# Patient Record
Sex: FEMALE | Race: WHITE | Employment: OTHER | URBAN - METROPOLITAN AREA
[De-identification: names, ages, dates, MRNs, and addresses within clinical notes are randomized per-mention and may not be internally consistent; named-entity substitution may affect disease eponyms.]

---

## 2017-05-03 DIAGNOSIS — E78.49 HYPERLIPIDEMIA, FAMILIAL, HIGH LDL: ICD-10-CM

## 2017-05-03 RX ORDER — ATORVASTATIN CALCIUM 40 MG/1
40 TABLET, FILM COATED ORAL DAILY
Qty: 90 TABLET | Refills: 0 | Status: SHIPPED | OUTPATIENT
Start: 2017-05-03 | End: 2017-05-30 | Stop reason: SDUPTHER

## 2017-05-07 DIAGNOSIS — M79.2 NEURALGIA OF RIGHT FOOT: ICD-10-CM

## 2017-05-07 DIAGNOSIS — M79.2 NEURALGIA OF LEFT FOOT: ICD-10-CM

## 2017-05-08 RX ORDER — GABAPENTIN 300 MG/1
CAPSULE ORAL
Qty: 200 CAPSULE | Refills: 0 | Status: SHIPPED | OUTPATIENT
Start: 2017-05-08 | End: 2017-06-27 | Stop reason: SDUPTHER

## 2017-05-30 ENCOUNTER — OFFICE VISIT (OUTPATIENT)
Dept: INTERNAL MEDICINE CLINIC | Age: 72
End: 2017-05-30

## 2017-05-30 VITALS
SYSTOLIC BLOOD PRESSURE: 115 MMHG | RESPIRATION RATE: 18 BRPM | BODY MASS INDEX: 25.07 KG/M2 | DIASTOLIC BLOOD PRESSURE: 70 MMHG | HEIGHT: 66 IN | WEIGHT: 156 LBS | HEART RATE: 82 BPM

## 2017-05-30 DIAGNOSIS — M17.0 PRIMARY OSTEOARTHRITIS OF BOTH KNEES: ICD-10-CM

## 2017-05-30 DIAGNOSIS — E78.49 HYPERLIPIDEMIA, FAMILIAL, HIGH LDL: ICD-10-CM

## 2017-05-30 DIAGNOSIS — R63.4 WEIGHT LOSS: ICD-10-CM

## 2017-05-30 DIAGNOSIS — F51.01 PRIMARY INSOMNIA: ICD-10-CM

## 2017-05-30 DIAGNOSIS — J44.9 COPD, SEVERE (HCC): ICD-10-CM

## 2017-05-30 DIAGNOSIS — K21.9 GASTROESOPHAGEAL REFLUX DISEASE WITHOUT ESOPHAGITIS: ICD-10-CM

## 2017-05-30 DIAGNOSIS — M25.552 LEFT HIP PAIN: ICD-10-CM

## 2017-05-30 DIAGNOSIS — I10 ESSENTIAL HYPERTENSION: ICD-10-CM

## 2017-05-30 DIAGNOSIS — N39.46 MIXED INCONTINENCE: ICD-10-CM

## 2017-05-30 DIAGNOSIS — Z00.00 MEDICARE ANNUAL WELLNESS VISIT, SUBSEQUENT: Primary | ICD-10-CM

## 2017-05-30 PROCEDURE — 99214 OFFICE O/P EST MOD 30 MIN: CPT | Performed by: INTERNAL MEDICINE

## 2017-05-30 PROCEDURE — G0439 PPPS, SUBSEQ VISIT: HCPCS | Performed by: INTERNAL MEDICINE

## 2017-05-30 RX ORDER — LISINOPRIL AND HYDROCHLOROTHIAZIDE 25; 20 MG/1; MG/1
1 TABLET ORAL DAILY
Qty: 90 TABLET | Refills: 1 | Status: SHIPPED | OUTPATIENT
Start: 2017-05-30 | End: 2017-08-08 | Stop reason: ALTCHOICE

## 2017-05-30 RX ORDER — OXYBUTYNIN CHLORIDE 5 MG/1
5 TABLET ORAL 3 TIMES DAILY PRN
Qty: 90 TABLET | Refills: 0 | Status: SHIPPED | OUTPATIENT
Start: 2017-05-30 | End: 2017-08-01 | Stop reason: SDUPTHER

## 2017-05-30 RX ORDER — ATORVASTATIN CALCIUM 40 MG/1
40 TABLET, FILM COATED ORAL DAILY
Qty: 90 TABLET | Refills: 0 | Status: SHIPPED | OUTPATIENT
Start: 2017-08-01 | End: 2017-08-02

## 2017-05-30 ASSESSMENT — LIFESTYLE VARIABLES
HOW OFTEN DURING THE LAST YEAR HAVE YOU HAD A FEELING OF GUILT OR REMORSE AFTER DRINKING: 0
AUDIT-C TOTAL SCORE: 3
HAVE YOU OR SOMEONE ELSE BEEN INJURED AS A RESULT OF YOUR DRINKING: 0
HOW OFTEN DURING THE LAST YEAR HAVE YOU BEEN UNABLE TO REMEMBER WHAT HAPPENED THE NIGHT BEFORE BECAUSE YOU HAD BEEN DRINKING: 0
HOW OFTEN DO YOU HAVE SIX OR MORE DRINKS ON ONE OCCASION: 0
HOW OFTEN DURING THE LAST YEAR HAVE YOU FAILED TO DO WHAT WAS NORMALLY EXPECTED FROM YOU BECAUSE OF DRINKING: 0
HOW OFTEN DURING THE LAST YEAR HAVE YOU FOUND THAT YOU WERE NOT ABLE TO STOP DRINKING ONCE YOU HAD STARTED: 0
HAS A RELATIVE, FRIEND, DOCTOR, OR ANOTHER HEALTH PROFESSIONAL EXPRESSED CONCERN ABOUT YOUR DRINKING OR SUGGESTED YOU CUT DOWN: 0
HOW OFTEN DO YOU HAVE A DRINK CONTAINING ALCOHOL: 3
HOW MANY STANDARD DRINKS CONTAINING ALCOHOL DO YOU HAVE ON A TYPICAL DAY: 0
AUDIT TOTAL SCORE: 3
HOW OFTEN DURING THE LAST YEAR HAVE YOU NEEDED AN ALCOHOLIC DRINK FIRST THING IN THE MORNING TO GET YOURSELF GOING AFTER A NIGHT OF HEAVY DRINKING: 0

## 2017-05-30 ASSESSMENT — PATIENT HEALTH QUESTIONNAIRE - PHQ9: SUM OF ALL RESPONSES TO PHQ QUESTIONS 1-9: 0

## 2017-05-30 ASSESSMENT — ANXIETY QUESTIONNAIRES: GAD7 TOTAL SCORE: 2

## 2017-06-27 DIAGNOSIS — M79.2 NEURALGIA OF RIGHT FOOT: ICD-10-CM

## 2017-06-27 DIAGNOSIS — M79.2 NEURALGIA OF LEFT FOOT: ICD-10-CM

## 2017-06-27 RX ORDER — GABAPENTIN 300 MG/1
CAPSULE ORAL
Qty: 270 CAPSULE | Refills: 0 | Status: SHIPPED | OUTPATIENT
Start: 2017-06-27 | End: 2017-11-01 | Stop reason: SDUPTHER

## 2017-08-01 ENCOUNTER — OFFICE VISIT (OUTPATIENT)
Dept: INTERNAL MEDICINE CLINIC | Age: 72
End: 2017-08-01

## 2017-08-01 ENCOUNTER — HOSPITAL ENCOUNTER (OUTPATIENT)
Dept: OTHER | Age: 72
Discharge: HOME | End: 2017-08-01
Attending: INTERNAL MEDICINE | Admitting: INTERNAL MEDICINE

## 2017-08-01 VITALS
BODY MASS INDEX: 24.91 KG/M2 | HEIGHT: 66 IN | WEIGHT: 155 LBS | DIASTOLIC BLOOD PRESSURE: 80 MMHG | SYSTOLIC BLOOD PRESSURE: 118 MMHG | HEART RATE: 72 BPM | RESPIRATION RATE: 18 BRPM

## 2017-08-01 DIAGNOSIS — J44.9 COPD, SEVERE (HCC): ICD-10-CM

## 2017-08-01 DIAGNOSIS — F43.23 ADJUSTMENT REACTION WITH ANXIETY AND DEPRESSION: ICD-10-CM

## 2017-08-01 DIAGNOSIS — M79.2 NEURALGIA OF LEFT FOOT: ICD-10-CM

## 2017-08-01 DIAGNOSIS — J44.9 OBSTRUCTIVE CHRONIC BRONCHITIS WITHOUT EXACERBATION (HCC): ICD-10-CM

## 2017-08-01 DIAGNOSIS — M79.2 NEURALGIA OF RIGHT FOOT: ICD-10-CM

## 2017-08-01 DIAGNOSIS — I10 ESSENTIAL HYPERTENSION: ICD-10-CM

## 2017-08-01 DIAGNOSIS — R91.8 OPACITY OF LUNG ON IMAGING STUDY: ICD-10-CM

## 2017-08-01 DIAGNOSIS — N39.46 MIXED INCONTINENCE: ICD-10-CM

## 2017-08-01 DIAGNOSIS — E78.49 HYPERLIPIDEMIA, FAMILIAL, HIGH LDL: Primary | ICD-10-CM

## 2017-08-01 LAB
CHOLESTEROL, TOTAL: 170 MG/DL (ref 0–199)
HDLC SERPL-MCNC: 99 MG/DL (ref 40–60)
LDL CHOLESTEROL CALCULATED: 55 MG/DL
TRIGL SERPL-MCNC: 80 MG/DL (ref 0–150)
VLDLC SERPL CALC-MCNC: 16 MG/DL

## 2017-08-01 PROCEDURE — 99214 OFFICE O/P EST MOD 30 MIN: CPT | Performed by: INTERNAL MEDICINE

## 2017-08-01 PROCEDURE — 36415 COLL VENOUS BLD VENIPUNCTURE: CPT | Performed by: INTERNAL MEDICINE

## 2017-08-01 RX ORDER — GABAPENTIN 300 MG/1
CAPSULE ORAL
Qty: 200 CAPSULE | Refills: 4 | Status: ON HOLD | OUTPATIENT
Start: 2017-09-01 | End: 2018-06-04

## 2017-08-01 RX ORDER — OXYBUTYNIN CHLORIDE 5 MG/1
5 TABLET ORAL 3 TIMES DAILY PRN
Qty: 90 TABLET | Refills: 8 | Status: SHIPPED | OUTPATIENT
Start: 2017-08-01 | End: 2018-07-12 | Stop reason: SDUPTHER

## 2017-08-01 RX ORDER — MIRTAZAPINE 15 MG/1
15 TABLET, FILM COATED ORAL NIGHTLY
Qty: 30 TABLET | Refills: 0 | Status: SHIPPED | OUTPATIENT
Start: 2017-08-01 | End: 2018-03-22 | Stop reason: ALTCHOICE

## 2017-08-02 ENCOUNTER — TELEPHONE (OUTPATIENT)
Dept: INTERNAL MEDICINE CLINIC | Age: 72
End: 2017-08-02

## 2017-08-02 DIAGNOSIS — E78.49 HYPERLIPIDEMIA, FAMILIAL, HIGH LDL: Primary | ICD-10-CM

## 2017-08-02 DIAGNOSIS — I10 ESSENTIAL HYPERTENSION: Primary | ICD-10-CM

## 2017-08-02 LAB
ALBUMIN SERPL-MCNC: 4.5 G/DL (ref 3.4–5)
ANION GAP SERPL CALCULATED.3IONS-SCNC: 21 MMOL/L (ref 3–16)
BUN BLDV-MCNC: 9 MG/DL (ref 7–20)
CALCIUM SERPL-MCNC: 9.8 MG/DL (ref 8.3–10.6)
CHLORIDE BLD-SCNC: 95 MMOL/L (ref 99–110)
CO2: 23 MMOL/L (ref 21–32)
CREAT SERPL-MCNC: 0.7 MG/DL (ref 0.6–1.2)
GFR AFRICAN AMERICAN: >60
GFR NON-AFRICAN AMERICAN: >60
GLUCOSE BLD-MCNC: 81 MG/DL (ref 70–99)
PHOSPHORUS: 3.2 MG/DL (ref 2.5–4.9)
POTASSIUM SERPL-SCNC: 4.5 MMOL/L (ref 3.5–5.1)
SODIUM BLD-SCNC: 139 MMOL/L (ref 136–145)

## 2017-08-02 RX ORDER — ATORVASTATIN CALCIUM 20 MG/1
20 TABLET, FILM COATED ORAL DAILY
Qty: 90 TABLET | Refills: 0 | Status: SHIPPED | OUTPATIENT
Start: 2017-08-02 | End: 2018-02-27 | Stop reason: SDUPTHER

## 2017-08-04 ENCOUNTER — HOSPITAL ENCOUNTER (OUTPATIENT)
Dept: CT IMAGING | Age: 72
Discharge: HOME | End: 2017-08-04
Attending: INTERNAL MEDICINE | Admitting: INTERNAL MEDICINE

## 2017-08-04 DIAGNOSIS — R91.8 OPACITY OF LUNG ON IMAGING STUDY: ICD-10-CM

## 2017-08-04 DIAGNOSIS — R91.8 MASS OF UPPER LOBE OF LEFT LUNG: ICD-10-CM

## 2017-08-04 DIAGNOSIS — J44.9 COPD, SEVERE (HCC): Primary | ICD-10-CM

## 2017-08-04 DIAGNOSIS — R91.8 OTHER NONSPECIFIC ABNORMAL FINDING OF LUNG FIELD: ICD-10-CM

## 2017-08-04 PROBLEM — I25.10 CORONARY ARTERY DISEASE INVOLVING NATIVE CORONARY ARTERY OF NATIVE HEART WITHOUT ANGINA PECTORIS: Status: ACTIVE | Noted: 2017-08-04

## 2017-08-08 ENCOUNTER — TELEPHONE (OUTPATIENT)
Dept: PULMONOLOGY | Age: 72
End: 2017-08-08

## 2017-08-08 ENCOUNTER — OFFICE VISIT (OUTPATIENT)
Dept: PULMONOLOGY | Age: 72
End: 2017-08-08

## 2017-08-08 VITALS
SYSTOLIC BLOOD PRESSURE: 140 MMHG | HEART RATE: 83 BPM | DIASTOLIC BLOOD PRESSURE: 86 MMHG | OXYGEN SATURATION: 94 % | WEIGHT: 155 LBS | BODY MASS INDEX: 24.33 KG/M2 | HEIGHT: 67 IN

## 2017-08-08 DIAGNOSIS — R59.0 MEDIASTINAL ADENOPATHY: ICD-10-CM

## 2017-08-08 DIAGNOSIS — F17.200 TOBACCO DEPENDENCE: ICD-10-CM

## 2017-08-08 DIAGNOSIS — R91.8 MASS OF LEFT LUNG: Primary | ICD-10-CM

## 2017-08-08 DIAGNOSIS — R91.8 GROUND GLASS OPACITY PRESENT ON IMAGING OF LUNG: ICD-10-CM

## 2017-08-08 DIAGNOSIS — J41.0 SIMPLE CHRONIC BRONCHITIS (HCC): ICD-10-CM

## 2017-08-08 PROCEDURE — 99215 OFFICE O/P EST HI 40 MIN: CPT | Performed by: INTERNAL MEDICINE

## 2017-08-08 ASSESSMENT — ENCOUNTER SYMPTOMS
CHEST TIGHTNESS: 0
DIARRHEA: 0
EYE ITCHING: 0
EYE PAIN: 0
COUGH: 1
CONSTIPATION: 0
ABDOMINAL PAIN: 0
VOICE CHANGE: 0
EYE DISCHARGE: 0
SHORTNESS OF BREATH: 1
SORE THROAT: 0

## 2017-08-16 ENCOUNTER — HOSPITAL ENCOUNTER (OUTPATIENT)
Dept: SURGERY | Age: 72
Discharge: HOME | End: 2017-08-16
Attending: INTERNAL MEDICINE | Admitting: INTERNAL MEDICINE

## 2017-08-16 VITALS
HEIGHT: 67 IN | WEIGHT: 155 LBS | OXYGEN SATURATION: 93 % | TEMPERATURE: 97.1 F | SYSTOLIC BLOOD PRESSURE: 134 MMHG | RESPIRATION RATE: 16 BRPM | BODY MASS INDEX: 24.33 KG/M2 | HEART RATE: 70 BPM | DIASTOLIC BLOOD PRESSURE: 93 MMHG

## 2017-08-16 DIAGNOSIS — R91.8 MASS OF LEFT LUNG: ICD-10-CM

## 2017-08-16 LAB
APPEARANCE BAL (LAVAGE): CLEAR
CLOT EVALUATION BAL: ABNORMAL
COLOR LAVAGE: ABNORMAL
EOSIN: 13 %
INR BLD: 0.88 (ref 0.85–1.15)
LYMPHOCYTES, BAL: 43 % (ref 5–10)
MACROPHAGES, BAL: 17 % (ref 90–95)
NUMBER OF CELLS COUNTED BAL (LAVAGE): 100
PROTHROMBIN TIME: 10 SEC (ref 9.6–13)
RBC, BAL: 1700 /CUMM
SEGMENTED NEUTROPHILS, BAL: 27 % (ref 5–10)
WBC/EPI CELLS BAL: 92 /CUMM

## 2017-08-16 PROCEDURE — 31628 BRONCHOSCOPY/LUNG BX EACH: CPT | Performed by: INTERNAL MEDICINE

## 2017-08-16 PROCEDURE — 31653 BRONCH EBUS SAMPLNG 3/> NODE: CPT | Performed by: INTERNAL MEDICINE

## 2017-08-16 PROCEDURE — 31623 DX BRONCHOSCOPE/BRUSH: CPT | Performed by: INTERNAL MEDICINE

## 2017-08-16 PROCEDURE — 31654 BRONCH EBUS IVNTJ PERPH LES: CPT | Performed by: INTERNAL MEDICINE

## 2017-08-16 PROCEDURE — 31624 DX BRONCHOSCOPE/LAVAGE: CPT | Performed by: INTERNAL MEDICINE

## 2017-08-16 PROCEDURE — 31629 BRONCHOSCOPY/NEEDLE BX EACH: CPT | Performed by: INTERNAL MEDICINE

## 2017-08-16 PROCEDURE — 31627 NAVIGATIONAL BRONCHOSCOPY: CPT | Performed by: INTERNAL MEDICINE

## 2017-08-16 RX ORDER — PROMETHAZINE HYDROCHLORIDE 25 MG/ML
6.25 INJECTION, SOLUTION INTRAMUSCULAR; INTRAVENOUS
Status: ACTIVE | OUTPATIENT
Start: 2017-08-16 | End: 2017-08-16

## 2017-08-16 RX ORDER — HYDROMORPHONE HCL 110MG/55ML
0.5 PATIENT CONTROLLED ANALGESIA SYRINGE INTRAVENOUS EVERY 5 MIN PRN
Status: DISCONTINUED | OUTPATIENT
Start: 2017-08-16 | End: 2017-08-17 | Stop reason: HOSPADM

## 2017-08-16 RX ORDER — HYDRALAZINE HYDROCHLORIDE 20 MG/ML
5 INJECTION INTRAMUSCULAR; INTRAVENOUS EVERY 10 MIN PRN
Status: DISCONTINUED | OUTPATIENT
Start: 2017-08-16 | End: 2017-08-17 | Stop reason: HOSPADM

## 2017-08-16 RX ORDER — HYDROMORPHONE HCL 110MG/55ML
0.25 PATIENT CONTROLLED ANALGESIA SYRINGE INTRAVENOUS EVERY 5 MIN PRN
Status: DISCONTINUED | OUTPATIENT
Start: 2017-08-16 | End: 2017-08-17 | Stop reason: HOSPADM

## 2017-08-16 RX ORDER — SODIUM CHLORIDE 0.9 % (FLUSH) 0.9 %
10 SYRINGE (ML) INJECTION PRN
Status: DISCONTINUED | OUTPATIENT
Start: 2017-08-16 | End: 2017-08-17 | Stop reason: HOSPADM

## 2017-08-16 RX ORDER — LABETALOL HYDROCHLORIDE 5 MG/ML
5 INJECTION, SOLUTION INTRAVENOUS EVERY 10 MIN PRN
Status: DISCONTINUED | OUTPATIENT
Start: 2017-08-16 | End: 2017-08-17 | Stop reason: HOSPADM

## 2017-08-16 RX ORDER — OXYCODONE HYDROCHLORIDE 5 MG/1
10 TABLET ORAL PRN
Status: ACTIVE | OUTPATIENT
Start: 2017-08-16 | End: 2017-08-16

## 2017-08-16 RX ORDER — LIDOCAINE HYDROCHLORIDE 40 MG/ML
5 SOLUTION TOPICAL ONCE
Status: DISCONTINUED | OUTPATIENT
Start: 2017-08-16 | End: 2017-08-17 | Stop reason: HOSPADM

## 2017-08-16 RX ORDER — LIDOCAINE HYDROCHLORIDE 10 MG/ML
1 INJECTION, SOLUTION INFILTRATION; PERINEURAL
Status: ACTIVE | OUTPATIENT
Start: 2017-08-16 | End: 2017-08-16

## 2017-08-16 RX ORDER — SODIUM CHLORIDE, SODIUM LACTATE, POTASSIUM CHLORIDE, CALCIUM CHLORIDE 600; 310; 30; 20 MG/100ML; MG/100ML; MG/100ML; MG/100ML
INJECTION, SOLUTION INTRAVENOUS CONTINUOUS
Status: DISCONTINUED | OUTPATIENT
Start: 2017-08-16 | End: 2017-08-17 | Stop reason: HOSPADM

## 2017-08-16 RX ORDER — SODIUM CHLORIDE 0.9 % (FLUSH) 0.9 %
10 SYRINGE (ML) INJECTION EVERY 12 HOURS SCHEDULED
Status: DISCONTINUED | OUTPATIENT
Start: 2017-08-16 | End: 2017-08-17 | Stop reason: HOSPADM

## 2017-08-16 RX ORDER — OXYCODONE HYDROCHLORIDE 5 MG/1
5 TABLET ORAL PRN
Status: ACTIVE | OUTPATIENT
Start: 2017-08-16 | End: 2017-08-16

## 2017-08-16 RX ORDER — MEPERIDINE HYDROCHLORIDE 25 MG/ML
12.5 INJECTION INTRAMUSCULAR; INTRAVENOUS; SUBCUTANEOUS EVERY 5 MIN PRN
Status: DISCONTINUED | OUTPATIENT
Start: 2017-08-16 | End: 2017-08-17 | Stop reason: HOSPADM

## 2017-08-16 RX ORDER — METOCLOPRAMIDE HYDROCHLORIDE 5 MG/ML
10 INJECTION INTRAMUSCULAR; INTRAVENOUS
Status: ACTIVE | OUTPATIENT
Start: 2017-08-16 | End: 2017-08-16

## 2017-08-16 RX ORDER — DIPHENHYDRAMINE HYDROCHLORIDE 50 MG/ML
12.5 INJECTION INTRAMUSCULAR; INTRAVENOUS
Status: ACTIVE | OUTPATIENT
Start: 2017-08-16 | End: 2017-08-16

## 2017-08-16 ASSESSMENT — PAIN - FUNCTIONAL ASSESSMENT: PAIN_FUNCTIONAL_ASSESSMENT: 0-10

## 2017-08-18 LAB
CULTURE, RESPIRATORY: NORMAL
GRAM STAIN RESULT: NORMAL

## 2017-08-21 ENCOUNTER — TELEPHONE (OUTPATIENT)
Dept: PULMONOLOGY | Age: 72
End: 2017-08-21

## 2017-08-21 DIAGNOSIS — R91.8 MULTIPLE PULMONARY NODULES: Primary | ICD-10-CM

## 2017-09-18 LAB
FUNGUS (MYCOLOGY) CULTURE: NORMAL
FUNGUS STAIN: NORMAL

## 2017-10-03 LAB
AFB CULTURE (MYCOBACTERIA): NORMAL
AFB SMEAR: NORMAL

## 2017-10-31 ENCOUNTER — HOSPITAL ENCOUNTER (OUTPATIENT)
Dept: MAMMOGRAPHY | Age: 72
Discharge: HOME | End: 2017-10-31
Attending: INTERNAL MEDICINE | Admitting: INTERNAL MEDICINE

## 2017-10-31 DIAGNOSIS — Z12.39 BREAST CANCER SCREENING: ICD-10-CM

## 2017-10-31 DIAGNOSIS — R91.8 OTHER NONSPECIFIC ABNORMAL FINDING OF LUNG FIELD (CODE): ICD-10-CM

## 2017-11-01 ENCOUNTER — OFFICE VISIT (OUTPATIENT)
Dept: PULMONOLOGY | Age: 72
End: 2017-11-01

## 2017-11-01 ENCOUNTER — HOSPITAL ENCOUNTER (OUTPATIENT)
Dept: CT IMAGING | Age: 72
Discharge: HOME | End: 2017-11-01
Attending: INTERNAL MEDICINE | Admitting: INTERNAL MEDICINE

## 2017-11-01 VITALS
WEIGHT: 148.6 LBS | HEIGHT: 67 IN | TEMPERATURE: 97.8 F | HEART RATE: 89 BPM | OXYGEN SATURATION: 98 % | SYSTOLIC BLOOD PRESSURE: 136 MMHG | BODY MASS INDEX: 23.32 KG/M2 | RESPIRATION RATE: 16 BRPM | DIASTOLIC BLOOD PRESSURE: 67 MMHG

## 2017-11-01 DIAGNOSIS — J43.2 CENTRILOBULAR EMPHYSEMA (HCC): ICD-10-CM

## 2017-11-01 DIAGNOSIS — R91.8 OTHER NONSPECIFIC ABNORMAL FINDING OF LUNG FIELD: ICD-10-CM

## 2017-11-01 DIAGNOSIS — R91.8 MULTIPLE PULMONARY NODULES: Primary | ICD-10-CM

## 2017-11-01 DIAGNOSIS — R91.8 MULTIPLE PULMONARY NODULES: ICD-10-CM

## 2017-11-01 DIAGNOSIS — F17.200 TOBACCO DEPENDENCE: ICD-10-CM

## 2017-11-01 DIAGNOSIS — R91.8 OTHER NONSPECIFIC ABNORMAL FINDING OF LUNG FIELD (CODE): ICD-10-CM

## 2017-11-01 PROCEDURE — 99214 OFFICE O/P EST MOD 30 MIN: CPT | Performed by: INTERNAL MEDICINE

## 2017-11-01 ASSESSMENT — ENCOUNTER SYMPTOMS
EYE PAIN: 0
SORE THROAT: 0
VOICE CHANGE: 0
CONSTIPATION: 0
SHORTNESS OF BREATH: 1
COUGH: 0
DIARRHEA: 0
ABDOMINAL PAIN: 0
CHEST TIGHTNESS: 0
EYE ITCHING: 0
EYE DISCHARGE: 0

## 2017-11-01 NOTE — PROGRESS NOTES
for mouth sores, sore throat and voice change. Eyes: Negative for pain, discharge and itching. Respiratory: Positive for shortness of breath. Negative for cough and chest tightness. Cardiovascular: Negative for chest pain, palpitations and leg swelling. Gastrointestinal: Negative for abdominal pain, constipation and diarrhea. Endocrine: Negative for cold intolerance, heat intolerance and polydipsia. Genitourinary: Negative for dysuria, frequency and hematuria. Musculoskeletal: Negative for gait problem, joint swelling and neck stiffness. Neurological: Negative for dizziness, numbness and headaches. Psychiatric/Behavioral: Negative for agitation, confusion and hallucinations. Physical Exam   Constitutional: She appears well-developed and well-nourished. No distress. HENT:   Head: Normocephalic and atraumatic. Mouth/Throat: Oropharynx is clear and moist. No oropharyngeal exudate. Eyes: EOM are normal. Pupils are equal, round, and reactive to light. Neck: Neck supple. No JVD present. Cardiovascular: Normal heart sounds. Exam reveals no gallop and no friction rub. No murmur heard. Pulmonary/Chest: Effort normal. She has no wheezes. She has no rales. Equal chest rise and expansion bilaterally   Abdominal: Soft. Bowel sounds are normal. She exhibits no distension. There is no tenderness. Musculoskeletal: Normal range of motion. She exhibits no edema. Lymphadenopathy:     She has no cervical adenopathy. Neurological: She is alert. No cranial nerve deficit. CN 2-12 grossly intact   Skin: Skin is warm and dry. No rash noted. She is not diaphoretic. ASSESSMENT:    1. Multiple pulmonary nodules    2. Centrilobular emphysema ([address]    3. Tobacco dependence      PLAN:     -Persistent nodules in a high risk patient, there is still concern for slow growing malignancy such as adenocarcinoma in situ (especially of the left lower lobe ground glass opacity) .  I reviewed this with

## 2017-11-01 NOTE — PATIENT INSTRUCTIONS
Tips to Help You Stop Smoking (taken from Up-To-Date)      Cigarette smoking is a preventable cause of death in the [place]. Quitting smoking now can decrease your risk of getting smoking-related illnesses like heart disease, stroke, cancer & COPD. S = Set a quit date. T = Tell family, friends, and the people around you that you plan to quit. A = Anticipate or plan ahead for the tough times you'll face while quitting. R = Remove cigarettes and other tobacco products from your home, car, and work. T = Talk to your doctor about getting help to quit. Your doctor may give you medicines to reduce your craving for cigarettes & the unpleasant symptoms that happen when you stop smoking (called withdrawal symptoms). What are the symptoms of withdrawal?  The symptoms include:   ?Trouble sleeping   ? Being irritable, anxious or restless   ? Getting frustrated or angry   ? Having trouble thinking clearly  Nicotine replacement therapy eases withdrawal and reduces your bodys craving for nicotine, the main drug found in cigarettes. Non-prescription forms of nicotine replacement include skin patches, lozenges, and gum. Two prescription medications are available that have been proven to help people stop smoking:  ? Bupropion is a prescription medicine that reduces your desire to smoke. This medicine is sold under the brand names Zyban and Wellbutrin & as a generic formulation. ? Varenicline (brand name: Chantix) is a prescription medicine that reduces withdrawal symptoms and cigarette cravings. If you take bupropion or varenicline and you have any new or worsening depressed mood or thoughts of harming yourself or someone else, stop taking the medicine and call your doctor. Buproprion should not be taken by anyone with a history of seizure or epilepsy. Will I gain weight if I quit?  Yes, you might gain a few pounds.  But quitting smoking will have a much more positive effect on your health than weighing a few pounds more. Plus, you can help prevent some weight gain by being more active and eating less. Taking the medicine bupropion might help control weight gain. What else can I do to improve my chances of quitting?  You can:  ?Start exercising. ?Stay away from smokers and places that you associate with smoking. If people close to you smoke, ask them to quit with you. ? Keep gum, hard candy, or something to put in your mouth handy. If you get a craving for a cigarette, try one of these instead. ?Dont give up, even if you start smoking again. It takes most people a few tries before they succeed. You can also get help from a free phone line (5-000-QUIT-NOW) or go online to www.smokefree.gov. Your pulmonary team is here to help you quit.   Call for assistance [phone]

## 2018-02-27 ENCOUNTER — OFFICE VISIT (OUTPATIENT)
Dept: INTERNAL MEDICINE CLINIC | Age: 73
End: 2018-02-27

## 2018-02-27 VITALS
SYSTOLIC BLOOD PRESSURE: 142 MMHG | OXYGEN SATURATION: 97 % | BODY MASS INDEX: 22.29 KG/M2 | HEART RATE: 67 BPM | HEIGHT: 67 IN | WEIGHT: 142 LBS | DIASTOLIC BLOOD PRESSURE: 82 MMHG

## 2018-02-27 DIAGNOSIS — I10 ESSENTIAL HYPERTENSION: Primary | ICD-10-CM

## 2018-02-27 DIAGNOSIS — E78.49 HYPERLIPIDEMIA, FAMILIAL, HIGH LDL: ICD-10-CM

## 2018-02-27 DIAGNOSIS — F43.23 ADJUSTMENT REACTION WITH ANXIETY AND DEPRESSION: ICD-10-CM

## 2018-02-27 DIAGNOSIS — F51.01 PRIMARY INSOMNIA: ICD-10-CM

## 2018-02-27 PROCEDURE — 99214 OFFICE O/P EST MOD 30 MIN: CPT | Performed by: INTERNAL MEDICINE

## 2018-02-27 RX ORDER — ATORVASTATIN CALCIUM 20 MG/1
20 TABLET, FILM COATED ORAL DAILY
Qty: 90 TABLET | Refills: 0 | Status: SHIPPED | OUTPATIENT
Start: 2018-02-27 | End: 2018-06-04 | Stop reason: SDUPTHER

## 2018-02-27 RX ORDER — CITALOPRAM 20 MG/1
20 TABLET ORAL DAILY
Qty: 30 TABLET | Refills: 0 | Status: SHIPPED | OUTPATIENT
Start: 2018-02-27 | End: 2018-03-22 | Stop reason: SDUPTHER

## 2018-02-27 RX ORDER — LOSARTAN POTASSIUM 50 MG/1
50 TABLET ORAL DAILY
Qty: 30 TABLET | Refills: 0 | Status: SHIPPED | OUTPATIENT
Start: 2018-02-27 | End: 2018-03-22 | Stop reason: SDUPTHER

## 2018-02-27 NOTE — PROGRESS NOTES
Lab Results   Component Value Date    CHOL 170 08/01/2017    TRIG 80 08/01/2017    HDL 99 08/01/2017    HDL 52 05/03/2012    LDLCALC 55 08/01/2017     Lab Results   Component Value Date    ALT 10 11/28/2016    AST 18 11/28/2016     No results found for: TSH, T4FREE  Lab Results   Component Value Date    WBC 7.2 11/28/2016    HGB 14.0 11/28/2016    HCT 44.2 11/28/2016    MCV 93.8 11/28/2016     11/28/2016     Lab Results   Component Value Date    INR 0.88 08/16/2017      No results found for: PSA   No results found for: [facility]     Patient Active Problem List   Diagnosis    Essential hypertension    Hyperlipidemia, familial, high LDL    Primary osteoarthritis of knee    Primary insomnia    Mixed incontinence    Gastroesophageal reflux disease without esophagitis    Onychomycosis    Left hip pain    Seasonal allergies    COPD, severe (HCC)    Adjustment reaction with anxiety and depression    Coronary artery disease involving native coronary artery of native heart without angina pectoris    Mass of left lung    Mediastinal adenopathy       Allergies   Allergen Reactions    Codeine     Morphine Other (See Comments)     On file at [facility] Prescriptions Marked as Taking for the 2/27/18 encounter (Office Visit) with Edmund Vallejo MD   Medication Sig Dispense Refill    atorvastatin (LIPITOR) 20 MG tablet Take 1 tablet by mouth daily 90 tablet 0    gabapentin (NEURONTIN) 300 MG capsule TAKE ONE TO TWO CAPSULES BY MOUTH FOUR TIMES A DAY AS TOLERATED FOR PAIN 200 capsule 4    oxybutynin (DITROPAN) 5 MG tablet Take 1 tablet by mouth 3 times daily as needed (urine incontinence) 90 tablet 8    mometasone-formoterol (DULERA) 200-5 MCG/ACT inhaler Inhale 2 puffs into the lungs every 12 hours 1 Inhaler 11    lisinopril (PRINIVIL;ZESTRIL) 10 MG tablet TAKE ONE TABLET BY MOUTH DAILY 90 tablet 3    traZODone (DESYREL) 50 MG tablet TAKE ONE TABLET BY MOUTH ONCE NIGHTLY AS NEEDED 90 tablet tablet; Take 1 tablet by mouth daily Start with 1/2 pill at night for 6-8 days and can increase to 1 pill a day as tolerated        Return <1 month on BP and Fasting Medicare Wellness 5/30 or after.

## 2018-03-22 ENCOUNTER — OFFICE VISIT (OUTPATIENT)
Dept: INTERNAL MEDICINE CLINIC | Age: 73
End: 2018-03-22

## 2018-03-22 VITALS
SYSTOLIC BLOOD PRESSURE: 112 MMHG | HEART RATE: 88 BPM | HEIGHT: 67 IN | WEIGHT: 142 LBS | BODY MASS INDEX: 22.29 KG/M2 | DIASTOLIC BLOOD PRESSURE: 64 MMHG

## 2018-03-22 DIAGNOSIS — I10 ESSENTIAL HYPERTENSION: ICD-10-CM

## 2018-03-22 DIAGNOSIS — F51.01 PRIMARY INSOMNIA: ICD-10-CM

## 2018-03-22 DIAGNOSIS — K21.9 GASTROESOPHAGEAL REFLUX DISEASE WITHOUT ESOPHAGITIS: ICD-10-CM

## 2018-03-22 DIAGNOSIS — N39.46 MIXED INCONTINENCE: ICD-10-CM

## 2018-03-22 DIAGNOSIS — J44.9 COPD, SEVERE (HCC): ICD-10-CM

## 2018-03-22 DIAGNOSIS — F43.23 ADJUSTMENT REACTION WITH ANXIETY AND DEPRESSION: Primary | ICD-10-CM

## 2018-03-22 DIAGNOSIS — E78.49 HYPERLIPIDEMIA, FAMILIAL, HIGH LDL: ICD-10-CM

## 2018-03-22 PROCEDURE — 99214 OFFICE O/P EST MOD 30 MIN: CPT | Performed by: INTERNAL MEDICINE

## 2018-03-22 RX ORDER — LOSARTAN POTASSIUM 50 MG/1
50 TABLET ORAL DAILY
Qty: 90 TABLET | Refills: 0 | Status: SHIPPED | OUTPATIENT
Start: 2018-03-22 | End: 2018-07-12 | Stop reason: ALTCHOICE

## 2018-03-22 RX ORDER — CITALOPRAM 20 MG/1
20 TABLET ORAL DAILY
Qty: 90 TABLET | Refills: 0 | Status: SHIPPED | OUTPATIENT
Start: 2018-03-22 | End: 2018-07-12 | Stop reason: SDUPTHER

## 2018-03-22 RX ORDER — RANITIDINE 150 MG/1
150 TABLET ORAL 2 TIMES DAILY
Qty: 60 TABLET | Refills: 1 | Status: SHIPPED | OUTPATIENT
Start: 2018-03-22 | End: 2018-07-12 | Stop reason: SDUPTHER

## 2018-05-22 PROBLEM — F10.10 ALCOHOL ABUSE: Status: ACTIVE | Noted: 2018-05-22

## 2018-05-22 PROBLEM — S42.009A CLAVICLE FRACTURE: Status: ACTIVE | Noted: 2018-05-22

## 2018-05-22 PROBLEM — G93.40 ENCEPHALOPATHY: Status: ACTIVE | Noted: 2018-05-22

## 2018-05-25 ENCOUNTER — TELEPHONE (OUTPATIENT)
Dept: PULMONOLOGY | Age: 73
End: 2018-05-25

## 2018-05-30 PROBLEM — E44.0 MODERATE MALNUTRITION (HCC): Chronic | Status: ACTIVE | Noted: 2018-05-30

## 2018-06-01 PROBLEM — R91.8 MASS OF UPPER LOBE OF LEFT LUNG: Status: ACTIVE | Noted: 2017-08-01

## 2018-06-04 DIAGNOSIS — E78.49 HYPERLIPIDEMIA, FAMILIAL, HIGH LDL: ICD-10-CM

## 2018-06-04 RX ORDER — ATORVASTATIN CALCIUM 20 MG/1
TABLET, FILM COATED ORAL
Qty: 90 TABLET | Refills: 0 | Status: ON HOLD | OUTPATIENT
Start: 2018-06-04 | End: 2018-09-10 | Stop reason: HOSPADM

## 2018-06-05 ENCOUNTER — TELEPHONE (OUTPATIENT)
Dept: ADMINISTRATIVE | Age: 73
End: 2018-06-05

## 2018-06-06 ENCOUNTER — TELEPHONE (OUTPATIENT)
Dept: INTERNAL MEDICINE CLINIC | Age: 73
End: 2018-06-06

## 2018-06-07 ENCOUNTER — OFFICE VISIT (OUTPATIENT)
Dept: INTERNAL MEDICINE CLINIC | Age: 73
End: 2018-06-07

## 2018-06-07 VITALS
RESPIRATION RATE: 16 BRPM | HEART RATE: 64 BPM | DIASTOLIC BLOOD PRESSURE: 60 MMHG | WEIGHT: 136 LBS | SYSTOLIC BLOOD PRESSURE: 100 MMHG | HEIGHT: 65 IN | BODY MASS INDEX: 22.66 KG/M2

## 2018-06-07 DIAGNOSIS — J44.9 COPD, SEVERE (HCC): ICD-10-CM

## 2018-06-07 DIAGNOSIS — I10 ESSENTIAL HYPERTENSION: Primary | ICD-10-CM

## 2018-06-07 DIAGNOSIS — R91.8 MULTIPLE PULMONARY NODULES: ICD-10-CM

## 2018-06-07 DIAGNOSIS — E78.49 HYPERLIPIDEMIA, FAMILIAL, HIGH LDL: ICD-10-CM

## 2018-06-07 DIAGNOSIS — I25.10 CORONARY ARTERY DISEASE INVOLVING NATIVE CORONARY ARTERY OF NATIVE HEART WITHOUT ANGINA PECTORIS: ICD-10-CM

## 2018-06-07 DIAGNOSIS — M17.0 PRIMARY OSTEOARTHRITIS OF BOTH KNEES: ICD-10-CM

## 2018-06-07 DIAGNOSIS — K21.9 GASTROESOPHAGEAL REFLUX DISEASE WITHOUT ESOPHAGITIS: ICD-10-CM

## 2018-06-07 DIAGNOSIS — N39.46 MIXED INCONTINENCE: ICD-10-CM

## 2018-06-07 DIAGNOSIS — F43.23 ADJUSTMENT REACTION WITH ANXIETY AND DEPRESSION: ICD-10-CM

## 2018-06-07 DIAGNOSIS — F10.939 ALCOHOL WITHDRAWAL SYNDROME WITH COMPLICATION (HCC): ICD-10-CM

## 2018-06-07 DIAGNOSIS — F51.01 PRIMARY INSOMNIA: ICD-10-CM

## 2018-06-07 PROCEDURE — 99214 OFFICE O/P EST MOD 30 MIN: CPT | Performed by: INTERNAL MEDICINE

## 2018-06-07 RX ORDER — GABAPENTIN 300 MG/1
300 CAPSULE ORAL EVERY EVENING
COMMUNITY
End: 2018-09-20 | Stop reason: SDUPTHER

## 2018-06-11 ENCOUNTER — HOSPITAL ENCOUNTER (OUTPATIENT)
Dept: CT IMAGING | Age: 73
Discharge: HOME | End: 2018-06-11
Admitting: INTERNAL MEDICINE

## 2018-06-11 DIAGNOSIS — R91.8 OTHER NONSPECIFIC ABNORMAL FINDING OF LUNG FIELD (CODE): ICD-10-CM

## 2018-06-11 DIAGNOSIS — R91.8 MASS OF UPPER LOBE OF LEFT LUNG: ICD-10-CM

## 2018-06-11 DIAGNOSIS — R59.0 MEDIASTINAL ADENOPATHY: ICD-10-CM

## 2018-06-14 ENCOUNTER — TELEPHONE (OUTPATIENT)
Dept: CARDIOTHORACIC SURGERY | Age: 73
End: 2018-06-14

## 2018-06-19 ENCOUNTER — OFFICE VISIT (OUTPATIENT)
Dept: CARDIOTHORACIC SURGERY | Age: 73
End: 2018-06-19

## 2018-06-19 VITALS
OXYGEN SATURATION: 96 % | BODY MASS INDEX: 21.5 KG/M2 | DIASTOLIC BLOOD PRESSURE: 58 MMHG | WEIGHT: 137 LBS | HEIGHT: 67 IN | SYSTOLIC BLOOD PRESSURE: 98 MMHG | TEMPERATURE: 97.6 F | HEART RATE: 68 BPM

## 2018-06-19 DIAGNOSIS — R91.8 LUNG MASS: Primary | ICD-10-CM

## 2018-06-19 PROCEDURE — 99203 OFFICE O/P NEW LOW 30 MIN: CPT | Performed by: THORACIC SURGERY (CARDIOTHORACIC VASCULAR SURGERY)

## 2018-06-21 ENCOUNTER — TELEPHONE (OUTPATIENT)
Dept: CARDIOTHORACIC SURGERY | Age: 73
End: 2018-06-21

## 2018-06-26 ENCOUNTER — TELEPHONE (OUTPATIENT)
Dept: PULMONOLOGY | Age: 73
End: 2018-06-26

## 2018-07-03 ENCOUNTER — HOSPITAL ENCOUNTER (OUTPATIENT)
Dept: PULMONOLOGY | Age: 73
Discharge: HOME | End: 2018-07-03
Attending: THORACIC SURGERY (CARDIOTHORACIC VASCULAR SURGERY) | Admitting: THORACIC SURGERY (CARDIOTHORACIC VASCULAR SURGERY)

## 2018-07-03 VITALS — OXYGEN SATURATION: 95 %

## 2018-07-03 DIAGNOSIS — R91.1 LUNG NODULE: ICD-10-CM

## 2018-07-03 DIAGNOSIS — R91.1 SOLITARY PULMONARY NODULE: ICD-10-CM

## 2018-07-03 RX ORDER — FLUDEOXYGLUCOSE F 18 200 MCI/ML
13.86 INJECTION, SOLUTION INTRAVENOUS
Status: COMPLETED | OUTPATIENT
Start: 2018-07-03 | End: 2018-07-03

## 2018-07-03 RX ORDER — ALBUTEROL SULFATE 90 UG/1
4 AEROSOL, METERED RESPIRATORY (INHALATION) ONCE
Status: DISCONTINUED | OUTPATIENT
Start: 2018-07-03 | End: 2018-07-04 | Stop reason: HOSPADM

## 2018-07-03 RX ORDER — ALBUTEROL SULFATE 90 UG/1
2 AEROSOL, METERED RESPIRATORY (INHALATION) EVERY 6 HOURS PRN
Status: DISCONTINUED | OUTPATIENT
Start: 2018-07-03 | End: 2018-07-03

## 2018-07-03 RX ADMIN — FLUDEOXYGLUCOSE F 18 13.86 MILLICURIE: 200 INJECTION, SOLUTION INTRAVENOUS at 13:20

## 2018-07-03 RX ADMIN — ALBUTEROL SULFATE 2 PUFF: 90 AEROSOL, METERED RESPIRATORY (INHALATION) at 12:28

## 2018-07-05 ENCOUNTER — OFFICE VISIT (OUTPATIENT)
Dept: CARDIOTHORACIC SURGERY | Age: 73
End: 2018-07-05

## 2018-07-05 VITALS
DIASTOLIC BLOOD PRESSURE: 62 MMHG | BODY MASS INDEX: 21.03 KG/M2 | SYSTOLIC BLOOD PRESSURE: 100 MMHG | HEART RATE: 75 BPM | OXYGEN SATURATION: 92 % | WEIGHT: 134 LBS | HEIGHT: 67 IN | TEMPERATURE: 96.8 F

## 2018-07-05 DIAGNOSIS — G93.41 ENCEPHALOPATHY, METABOLIC: Primary | ICD-10-CM

## 2018-07-05 DIAGNOSIS — R91.8 MASS OF UPPER LOBE OF LEFT LUNG: ICD-10-CM

## 2018-07-05 DIAGNOSIS — J44.9 COPD, SEVERE (HCC): ICD-10-CM

## 2018-07-05 DIAGNOSIS — I25.10 CORONARY ARTERY DISEASE INVOLVING NATIVE CORONARY ARTERY OF NATIVE HEART WITHOUT ANGINA PECTORIS: ICD-10-CM

## 2018-07-05 PROCEDURE — 99212 OFFICE O/P EST SF 10 MIN: CPT | Performed by: THORACIC SURGERY (CARDIOTHORACIC VASCULAR SURGERY)

## 2018-07-05 NOTE — PROCEDURES
[facility]                   [address]                                PULMONARY FUNCTION    PATIENT NAME: Glenda Spencer                  :        1945  MED REC NO:   [id number]                          ROOM:  ACCOUNT NO:   [de-identified]                          ADMIT DATE: 2018  PROVIDER:     Luciana Perkins MD    DATE OF PROCEDURE:  2018    PFT    Spirometry showed FVC 2.11 L, 67% predicted, FEV1 1.05 L, 44% predicted,  with FEV1/FVC ratio of 49%. There was a response to bronchodilator,  with postbronchodilator FEV1 being 1.23 L, 52% predicted. There was significant  hyperinflation and air trapping. Diffusion capacity was 67%  predicted. IMPRESSION:  PFT is compatible with severe COPD with a response to  bronchodilator. Clinical correlation is recommended. Darby Boeck, MD    D: 2018 10:07:43       T: 2018 14:01:57     [email or web address]  Job#: [id number]     Doc#: [id number]    CC:   MD Naveen Leos MD

## 2018-07-12 ENCOUNTER — OFFICE VISIT (OUTPATIENT)
Dept: INTERNAL MEDICINE CLINIC | Age: 73
End: 2018-07-12

## 2018-07-12 VITALS
HEART RATE: 69 BPM | HEIGHT: 67 IN | SYSTOLIC BLOOD PRESSURE: 138 MMHG | DIASTOLIC BLOOD PRESSURE: 70 MMHG | WEIGHT: 135.8 LBS | OXYGEN SATURATION: 86 % | BODY MASS INDEX: 21.31 KG/M2

## 2018-07-12 DIAGNOSIS — Z00.00 MEDICARE ANNUAL WELLNESS VISIT, SUBSEQUENT: Primary | ICD-10-CM

## 2018-07-12 DIAGNOSIS — Z87.891 PERSONAL HISTORY OF TOBACCO USE, PRESENTING HAZARDS TO HEALTH: ICD-10-CM

## 2018-07-12 DIAGNOSIS — Z00.00 ROUTINE GENERAL MEDICAL EXAMINATION AT A HEALTH CARE FACILITY: ICD-10-CM

## 2018-07-12 DIAGNOSIS — J43.2 CENTRILOBULAR EMPHYSEMA (HCC): ICD-10-CM

## 2018-07-12 DIAGNOSIS — F43.23 ADJUSTMENT REACTION WITH ANXIETY AND DEPRESSION: ICD-10-CM

## 2018-07-12 DIAGNOSIS — Z12.11 SCREEN FOR COLON CANCER: ICD-10-CM

## 2018-07-12 DIAGNOSIS — R91.8 MULTIPLE PULMONARY NODULES: ICD-10-CM

## 2018-07-12 DIAGNOSIS — R59.0 MEDIASTINAL ADENOPATHY: ICD-10-CM

## 2018-07-12 DIAGNOSIS — Z23 NEED FOR PROPHYLACTIC VACCINATION AND INOCULATION AGAINST VARICELLA: ICD-10-CM

## 2018-07-12 DIAGNOSIS — E78.49 HYPERLIPIDEMIA, FAMILIAL, HIGH LDL: ICD-10-CM

## 2018-07-12 DIAGNOSIS — N39.46 MIXED INCONTINENCE: ICD-10-CM

## 2018-07-12 DIAGNOSIS — K21.9 GASTROESOPHAGEAL REFLUX DISEASE WITHOUT ESOPHAGITIS: ICD-10-CM

## 2018-07-12 DIAGNOSIS — R91.8 MASS OF UPPER LOBE OF LEFT LUNG: ICD-10-CM

## 2018-07-12 LAB
CHOLESTEROL, TOTAL: 153 MG/DL (ref 0–199)
HDLC SERPL-MCNC: 62 MG/DL (ref 40–60)
LDL CHOLESTEROL CALCULATED: 73 MG/DL
TRIGL SERPL-MCNC: 88 MG/DL (ref 0–150)
VLDLC SERPL CALC-MCNC: 18 MG/DL

## 2018-07-12 PROCEDURE — 99406 BEHAV CHNG SMOKING 3-10 MIN: CPT | Performed by: INTERNAL MEDICINE

## 2018-07-12 PROCEDURE — G0439 PPPS, SUBSEQ VISIT: HCPCS | Performed by: INTERNAL MEDICINE

## 2018-07-12 PROCEDURE — 36415 COLL VENOUS BLD VENIPUNCTURE: CPT | Performed by: INTERNAL MEDICINE

## 2018-07-12 PROCEDURE — 99214 OFFICE O/P EST MOD 30 MIN: CPT | Performed by: INTERNAL MEDICINE

## 2018-07-12 RX ORDER — CITALOPRAM 20 MG/1
20 TABLET ORAL DAILY
Qty: 90 TABLET | Refills: 3 | Status: SHIPPED | OUTPATIENT
Start: 2018-07-12 | End: 2019-01-01 | Stop reason: SDUPTHER

## 2018-07-12 RX ORDER — GABAPENTIN 100 MG/1
CAPSULE ORAL
COMMUNITY
Start: 2018-06-21 | End: 2018-07-12 | Stop reason: SDUPTHER

## 2018-07-12 RX ORDER — RANITIDINE 150 MG/1
150 TABLET ORAL 2 TIMES DAILY
Qty: 180 TABLET | Refills: 3 | Status: SHIPPED | OUTPATIENT
Start: 2018-07-12 | End: 2018-07-29 | Stop reason: ALTCHOICE

## 2018-07-12 RX ORDER — OXYBUTYNIN CHLORIDE 5 MG/1
5 TABLET ORAL 2 TIMES DAILY
Qty: 180 TABLET | Refills: 3 | Status: SHIPPED | OUTPATIENT
Start: 2018-07-12 | End: 2019-01-01 | Stop reason: SDUPTHER

## 2018-07-12 ASSESSMENT — ANXIETY QUESTIONNAIRES: GAD7 TOTAL SCORE: 3

## 2018-07-12 ASSESSMENT — LIFESTYLE VARIABLES: HOW OFTEN DO YOU HAVE A DRINK CONTAINING ALCOHOL: 0

## 2018-07-12 ASSESSMENT — PATIENT HEALTH QUESTIONNAIRE - PHQ9: SUM OF ALL RESPONSES TO PHQ QUESTIONS 1-9: 2

## 2018-07-12 NOTE — PROGRESS NOTES
SpO2: (!) 86%   Weight: 135 lb 12.8 oz (61.6 kg)   Height: 5' 6.5\" (1.689 m)           Patient's complete Health Risk Assessment and screening values have been reviewed and are found in Flowsheets. The following problems were reviewed today and where indicated follow up appointments were made and/or referrals ordered. Positive Risk Factor Screenings with Interventions:     Substance Abuse:  Social History     Tobacco History     Smoking Status  Current Every Day Smoker Last attempt to quit  6/7/2011 Smoking Frequency  1 pack/day for 50 years (48 pk yrs) Smoking Tobacco Type  Cigarettes    Smokeless Tobacco Use  Never Used    Tobacco Comment  1/2 ppd as of 7/5/18          Alcohol History     Alcohol Use Status  Yes Drinks/Week  8 Standard drinks or equivalent per week Amount  4.8 oz alcohol/wk Comment  7/5/18- No alcohol          Drug Use     Drug Use Status  No          Sexual Activity     Sexually Active  No               Audit Questionnaire: Screen for Alcohol Misuse  How often do you have a drink containing alcohol?: Never  Substance Abuse Interventions:  · None indicated    General Health:  General  In general, how would you say your health is?: Fair  In the past 7 days, have you experienced any of the following?: (!) Stress  Do you get the social and emotional support that you need?: Yes  Do you have a Living Will?: Yes  General Health Risk Interventions:  · just been told pulmonary mass maybe cancer    Health Habits/Nutrition:  Health Habits/Nutrition  Do you exercise for at least 20 minutes 2-3 times per week?: Yes  Have you lost any weight without trying in the past 3 months?: (!) Yes  Do you eat fewer than 2 meals per day?: No  Have you seen a dentist within the past year?: (!) No  Body mass index is 21.59 kg/m².   Health Habits/Nutrition Interventions:  · Dental exam overdue:  patient encouraged to make appointment with his/her dentist    Safety:  Safety  Do you have working smoke detectors?: Yes  Have all throw rugs been removed or fastened?: Yes  Do you have non-slip mats in all bathtubs?: Yes  Do all of your stairways have a railing or banister?: (!) No (N/A)  Are your doorways, halls and stairs free of clutter?: Yes  Do you always fasten your seatbelt when you are in a car?: Yes  Safety Interventions:  · None indicated    ADL:  ADLs  In the past 7 days, did you need help from others to perform any of the following everyday activities?: None  In the past 7 days, did you need help from others to take care of any of the following?: [employer], Housekeeping, Shopping, Transportation  ADL Interventions:  · has significant other living with her and helping her    Personalized Preventive Plan   Current Health Maintenance Status  Immunization History   Administered Date(s) Administered    Influenza Vaccine, unspecified formulation 09/29/2016    Influenza Virus Vaccine 09/06/2012    Influenza Whole 01/21/2010, 09/22/2011    Influenza, High Dose 10/25/2011, 11/01/2017    Pneumococcal 13-valent Conjugate ([id number]) 05/15/2014    Pneumococcal Polysaccharide ([id number]) 11/23/2009, 09/29/2016        Health Maintenance   Topic Date Due    DTaP/Tdap/Td vaccine (1 - Tdap) 09/17/1963    Shingles Vaccine (1 of 2 - 2 Dose Series) 09/17/1994    A1C test (Diabetic or Prediabetic)  05/31/2017    Flu vaccine (1) 09/01/2018    Potassium monitoring  06/04/2019    Creatinine monitoring  06/04/2019    Low dose CT lung screening  07/03/2019    Breast cancer screen  10/31/2019    Lipid screen  08/01/2022    Colon cancer screen colonoscopy  02/01/2023    DEXA (modify frequency per FRAX score)  Addressed    Pneumococcal low/med risk  Completed     Recommendations for Preventive Services Due: see orders.   Recommended screening schedule for the next 5-10 years is provided to the patient in written form: see Patient Instructions/AVS.  Tobacco Cessation Counseling: Patient advised about behavior change, including

## 2018-07-12 NOTE — PATIENT INSTRUCTIONS
Personalized Preventive Plan for Mecca Berry - 7/12/2018  Medicare offers a range of preventive health benefits. Some of the tests and screenings are paid in full while other may be subject to a deductible, co-insurance, and/or copay. Some of these benefits include a comprehensive review of your medical history including lifestyle, illnesses that may run in your family, and various assessments and screenings as appropriate. After reviewing your medical record and screening and assessments performed today your provider may have ordered immunizations, labs, imaging, and/or referrals for you. A list of these orders (if applicable) as well as your Preventive Care list are included within your After Visit Summary for your review. Other Preventive Recommendations:    · A preventive eye exam performed by an eye specialist is recommended every 1-2 years to screen for glaucoma; cataracts, macular degeneration, and other eye disorders. · A preventive dental visit is recommended every 6 months. · Try to get at least 150 minutes of exercise per week or 10,000 steps per day on a pedometer . · Order or download the FREE \"Exercise & Physical Activity: Your Everyday Guide\" from [employer]. Call 3-150.408.9425 or search [employer] online. · You need 2140-4414 mg of calcium and 2358-8634 IU of vitamin D per day. It is possible to meet your calcium requirement with diet alone, but a vitamin D supplement is usually necessary to meet this goal.  · When exposed to the sun, use a sunscreen that protects against both UVA and UVB radiation with an SPF of 30 or greater. Reapply every 2 to 3 hours or after sweating, drying off with a towel, or swimming. · Always wear a seat belt when traveling in a car. Always wear a helmet when riding a bicycle or motorcycle.

## 2018-07-12 NOTE — PROGRESS NOTES
Lena Millard  YOB: 1945    Date of Service:  2018    Chief Complaint:      Chief Complaint   Patient presents with    Medicare AWV     Pt is fasting. Pt was recently admitted to [facility] for confusion on 18 and was released 18     Follow-up       HPI:  Lena Millard is a 67 y.o. Treatment Adherence:   Medication compliance: compliant all of the time   Diet compliance: compliant most of the time   Weight trend: stable   Current exercise: 4-5 days/week at the stores   Barriers: impairment: physical: bilateral hip pain   Hyperlipidemia: No new myalgias or GI upset with decrease to lipitor 20 mg qd due to weight loss. Chronic bilateral knee OA stable on Neurontin 300 mg 1 qhs. She decline any NSAID. No GI upset. Mix Urine incontinence stable on oxybutynin 5 mg bid and without much dry mouth. GERD stable on Zantac 150 mg bid.   Depression: stable on Celexa 20 mg qd but not want to increase dose at this time. Valerie Amaral son is dying of AIDS and her partner being ill and financial problems with cost of medication.  No SI or HI.    Insomnia:  ?  off trazadone 50 mg qhs.    Hypertension: Home blood pressure monitorin/80 off Losartan 50 mg qd.   She is adherent to a low sodium diet. Patient denies chest pain, shortness of breath, headache, lightheadedness, blurred vision, peripheral edema, palpitations, dry cough and fatigue. Antihypertensive medication side effects: no medication side effects noted. Use of agents associated with hypertension: none. Severe COPD with ? Lung CA with mets: has an upcoming appoint with Dr. Marya Pedraza oncologist refer from Dr. Alex Swanson. She's off Dulera 200-5 2 puffs bid due to cost and decline albuteral inhaler at this time.   Weight loss has stabilize with drinking Ensure daily.     Lab Results   Component Value Date    LABA1C 6.2 2016    LABMICR YES 2018     Lab Results   Component Value Date     2018    K 3.9 2018 tenderness. Skin: No rash or erythema. Psychiatric: Normal mood and affect. Behavior is normal.     Assessment/Plan:  Roselia Vázquez was seen today for medicare awv and follow-up. Diagnoses and all orders for this visit:    Medicare annual wellness visit, subsequent    Screen for colon cancer  -     Ambulatory referral to Gastroenterology    Routine general medical examination at a health care facility    Need for prophylactic vaccination and inoculation against varicella  -     zoster recombinant adjuvanted vaccine (SHINGRIX) 50 MCG SUSR injection; Inject 0.5 mLs into the muscle once for 1 dose    Personal history of tobacco use, presenting hazards to health  -     TX TOBACCO USE CESSATION INTERMEDIATE 3-10 MINUTES [71885]    Adjustment reaction with anxiety and depression  -     citalopram (CELEXA) 20 MG tablet; Take 1 tablet by mouth daily    Gastroesophageal reflux disease without esophagitis  -     ranitidine (ZANTAC) 150 MG tablet; Take 1 tablet by mouth 2 times daily    Mixed incontinence  -     oxybutynin (DITROPAN) 5 MG tablet; Take 1 tablet by mouth 2 times daily    Hyperlipidemia, familial, high LDL  -     Lipid Panel    Centrilobular emphysema (HCC)    Mass of upper lobe of left lung    Multiple pulmonary nodules    Mediastinal adenopathy        Return 6 months med check.

## 2018-07-21 NOTE — PROGRESS NOTES
Consultation H&P           Date of Admission:  No admission date for patient encounter. Date of Consultation:  6/20/2018     PCP:  Coral Kayser, MD      Chief Complaint: Lung nodule     History of Present Illness: We are asked to see this patient in consultation by Dr. cruz  Tesfaye Joyce is a 67 y.o. female who left upper lobe lung nodule growing followed up for the last year DOUBLING the size since last November  CT-guided biopsy was performed showed atypical cell patient's     Past Medical History:  Past Medical History        Past Medical History:   Diagnosis Date    Alcohol abuse 5/22/2018    Asthma      COPD, severe ([facility] [address] 5/26/2015    Coronary artery disease involving native coronary artery of native heart without angina pectoris 8/4/2017    GERD (gastroesophageal reflux disease)      Hyperlipidemia      Hypertension      Injury of esophagus       inflamed    Insomnia      Kidney stone [id number]    Mass of upper lobe of left lung 08/2017    Neuropathy ([facility] [address] 11/13/2014    Osteoarthritis      Urinary incontinence              Past Surgical History:  Past Surgical History         Past Surgical History:   Procedure Laterality Date    BRONCHOSCOPY   08/16/2017     Dr. Monica Hess - w/BAL of LATANYA, LLL; EBUS guided lymph node biopsies, TBNA of LATANYA lung mass    COLONOSCOPY   02/05/2013     Dr. Mike Duvall - 4 diminutive sessile rectal polyps    COLONOSCOPY   02/02/2010     Dr. Mike Duvall - adenomatous colon polyps    HYSTERECTOMY, TOTAL ABDOMINAL        LITHOTRIPSY                Home Medications:   Home Medications           Prior to Admission medications    Medication Sig Start Date End Date Taking? Authorizing Provider   aspirin 81 MG tablet Take 81 mg by mouth daily     Yes Historical Provider, MD   gabapentin (NEURONTIN) 300 MG capsule Take 300 mg by mouth every evening. .     Yes Historical Provider, MD   atorvastatin (LIPITOR) 20 MG tablet TAKE ONE TABLET BY MOUTH DAILY 6/4/18   Yes No palpable/percussable abnormalities. Cardiovascular: regular, no murmur. PMI normal, no thrill. No carotid bruits. No edema or varicosities. Abdominal aorta cannot be appreciated given body habitus. GI: abdomen soft, nondistended, no organomegaly. No masses. Lymphatic: no cervical/supraclavicular adenopathy  Musculoskeletal: strength and tone normal. Full ROM. No scoliosis. Extremities: warm and pink. No clubbing or petechiae. Skin: no dermatitis or ulceration. No nodularity or induration. Neuro: CN grossly intact. Sensation and motor function grossly intact. Psychiatric: oriented, appropriate mood/affect.        MEDICAL DECISION MAKING/TESTING  Studies personally reviewed.        Echo:      CXR:      CTStatus post CT-guided biopsy of left upper lobe lung mass as described above.     PET/CT       1. Metabolic activity associated with the nodular opacity in the posterior   left upper lobe concerning for primary lung cancer.       2.  Focal FDG activity localizing to an enlarged, partially calcified AP   window lymph node, stable in CT appearance since 08/04/2017.  This could be a   chronic reactive lymph node or potentially chronic lymph node with   superimposed metastasis.       3.  Mild FDG activity corresponding to stable ground-glass nodules, likely   representing low-grade malignancy such as adenocarcinoma in situ.           PFTPFT     Spirometry showed FVC 2.11 L, 67% predicted, FEV1 1.05 L, 44% predicted,  with FEV1/FVC ratio of 49%. There was a response to bronchodilator,  with postbronchodilator FEV1 being 1.23 L, 52% predicted. There was significant  hyperinflation and air trapping. Diffusion capacity was 67%  predicted.     IMPRESSION:  PFT is compatible with severe COPD with a response to  bronchodilator. Clinical correlation is recommended.                 Labs:   CBC: No results for input(s): WBC, HGB, HCT, MCV, PLT in the last 72 hours.   BMP: No results for input(s): NA, K, CL, CO2, PHOS, BUN, CREATININE, CALCIUM, MG in the last 72 hours. Cardiac Enzymes: No results for input(s): CKTOTAL, CKMB, CKMBINDEX, TROPONINI in the last 72 hours. PT/INR: No results for input(s): PROTIME, INR in the last 72 hours. APTT: No results for input(s): APTT in the last 72 hours. Liver Profile:        Lab Results   Component Value Date     AST 16 05/26/2018     ALT 8 05/26/2018     BILIDIR 0.3 05/26/2018     BILITOT 1.1 05/26/2018     ALKPHOS 60 05/26/2018            Lab Results   Component Value Date     CHOL 170 08/01/2017     HDL 99 08/01/2017     HDL 52 05/03/2012     TRIG 80 08/01/2017      UA:         Lab Results   Component Value Date     NITRITE Negative 05/26/2015     COLORU Yellow 05/22/2018     PHUR 8.0 05/22/2018     WBCUA 3-5 05/22/2018     RBCUA 0-2 05/22/2018     BACTERIA 1+ 05/22/2018     CLARITYU Clear 05/22/2018     SPECGRAV 1.020 05/22/2018     LEUKOCYTESUR Negative 05/22/2018     UROBILINOGEN 1.0 05/22/2018     BILIRUBINUR Negative 05/22/2018     BILIRUBINUR 1 05/26/2015     BLOODU Negative 05/22/2018     GLUCOSEU Negative 05/22/2018         History obtained: chart, pt     Risk factors: Smoker, alcohol significant history     Diagnosis:  Atypical cell, 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  for chemoradiation if it's negative I will do a wedge resection of her left upper lobe lesion patient was discussed and the tumor conference

## 2018-07-29 ENCOUNTER — HOSPITAL ENCOUNTER (EMERGENCY)
Age: 73
Discharge: HOME | End: 2018-07-29
Attending: EMERGENCY MEDICINE
Payer: MEDICARE

## 2018-07-29 ENCOUNTER — APPOINTMENT (OUTPATIENT)
Dept: CT IMAGING | Age: 73
End: 2018-07-29
Payer: MEDICARE

## 2018-07-29 VITALS
WEIGHT: 135 LBS | HEART RATE: 60 BPM | TEMPERATURE: 98 F | SYSTOLIC BLOOD PRESSURE: 120 MMHG | OXYGEN SATURATION: 100 % | BODY MASS INDEX: 21.19 KG/M2 | HEIGHT: 67 IN | RESPIRATION RATE: 18 BRPM | DIASTOLIC BLOOD PRESSURE: 52 MMHG

## 2018-07-29 DIAGNOSIS — R42 ORTHOSTATIC DIZZINESS: Primary | ICD-10-CM

## 2018-07-29 DIAGNOSIS — E86.0 DEHYDRATION: ICD-10-CM

## 2018-07-29 LAB
A/G RATIO: 1.3 (ref 1.1–2.2)
ALBUMIN SERPL-MCNC: 3.6 G/DL (ref 3.4–5)
ALP BLD-CCNC: 68 U/L (ref 40–129)
ALT SERPL-CCNC: 10 U/L (ref 10–40)
ANION GAP SERPL CALCULATED.3IONS-SCNC: 10 MMOL/L (ref 3–16)
AST SERPL-CCNC: 17 U/L (ref 15–37)
BASOPHILS ABSOLUTE: 0.1 K/UL (ref 0–0.2)
BASOPHILS RELATIVE PERCENT: 0.7 %
BILIRUB SERPL-MCNC: 0.7 MG/DL (ref 0–1)
BUN BLDV-MCNC: 18 MG/DL (ref 7–20)
CALCIUM SERPL-MCNC: 9.7 MG/DL (ref 8.3–10.6)
CHLORIDE BLD-SCNC: 101 MMOL/L (ref 99–110)
CO2: 28 MMOL/L (ref 21–32)
CREAT SERPL-MCNC: 0.9 MG/DL (ref 0.6–1.2)
EOSINOPHILS ABSOLUTE: 0.2 K/UL (ref 0–0.6)
EOSINOPHILS RELATIVE PERCENT: 2.3 %
ETHANOL: NORMAL MG/DL (ref 0–0.08)
GFR AFRICAN AMERICAN: >60
GFR NON-AFRICAN AMERICAN: >60
GLOBULIN: 2.8 G/DL
GLUCOSE BLD-MCNC: 118 MG/DL (ref 70–99)
HCT VFR BLD CALC: 37.3 % (ref 36–48)
HEMOGLOBIN: 13 G/DL (ref 12–16)
LYMPHOCYTES ABSOLUTE: 2.3 K/UL (ref 1–5.1)
LYMPHOCYTES RELATIVE PERCENT: 22.6 %
MCH RBC QN AUTO: 30.7 PG (ref 26–34)
MCHC RBC AUTO-ENTMCNC: 34.8 G/DL (ref 31–36)
MCV RBC AUTO: 88.3 FL (ref 80–100)
MONOCYTES ABSOLUTE: 0.7 K/UL (ref 0–1.3)
MONOCYTES RELATIVE PERCENT: 6.6 %
NEUTROPHILS ABSOLUTE: 6.9 K/UL (ref 1.7–7.7)
NEUTROPHILS RELATIVE PERCENT: 67.8 %
PDW BLD-RTO: 12.4 % (ref 12.4–15.4)
PLATELET # BLD: 281 K/UL (ref 135–450)
PMV BLD AUTO: 8.6 FL (ref 5–10.5)
POTASSIUM SERPL-SCNC: 4.2 MMOL/L (ref 3.5–5.1)
RBC # BLD: 4.22 M/UL (ref 4–5.2)
SODIUM BLD-SCNC: 139 MMOL/L (ref 136–145)
TOTAL PROTEIN: 6.4 G/DL (ref 6.4–8.2)
WBC # BLD: 10.2 K/UL (ref 4–11)

## 2018-07-29 PROCEDURE — 99285 EMERGENCY DEPT VISIT HI MDM: CPT

## 2018-07-29 PROCEDURE — 2580000003 HC RX 258: Performed by: EMERGENCY MEDICINE

## 2018-07-29 PROCEDURE — G0480 DRUG TEST DEF 1-7 CLASSES: HCPCS

## 2018-07-29 PROCEDURE — 93005 ELECTROCARDIOGRAM TRACING: CPT | Performed by: EMERGENCY MEDICINE

## 2018-07-29 PROCEDURE — 80053 COMPREHEN METABOLIC PANEL: CPT

## 2018-07-29 PROCEDURE — 36415 COLL VENOUS BLD VENIPUNCTURE: CPT

## 2018-07-29 PROCEDURE — 72125 CT NECK SPINE W/O DYE: CPT

## 2018-07-29 PROCEDURE — 85025 COMPLETE CBC W/AUTO DIFF WBC: CPT

## 2018-07-29 PROCEDURE — 96361 HYDRATE IV INFUSION ADD-ON: CPT

## 2018-07-29 PROCEDURE — 96360 HYDRATION IV INFUSION INIT: CPT

## 2018-07-29 PROCEDURE — 70450 CT HEAD/BRAIN W/O DYE: CPT

## 2018-07-29 RX ORDER — 0.9 % SODIUM CHLORIDE 0.9 %
1000 INTRAVENOUS SOLUTION INTRAVENOUS ONCE
Status: COMPLETED | OUTPATIENT
Start: 2018-07-29 | End: 2018-07-29

## 2018-07-29 RX ADMIN — SODIUM CHLORIDE 1000 ML: 9 INJECTION, SOLUTION INTRAVENOUS at 19:46

## 2018-07-29 RX ADMIN — SODIUM CHLORIDE 1000 ML: 9 INJECTION, SOLUTION INTRAVENOUS at 17:51

## 2018-07-29 ASSESSMENT — PAIN DESCRIPTION - ORIENTATION: ORIENTATION: RIGHT

## 2018-07-29 ASSESSMENT — PAIN SCALES - GENERAL: PAINLEVEL_OUTOF10: 2

## 2018-07-29 ASSESSMENT — PAIN DESCRIPTION - LOCATION: LOCATION: EYE
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
## 2018-07-30 LAB
EKG ATRIAL RATE: 70 BPM
EKG DIAGNOSIS: NORMAL
EKG P AXIS: 76 DEGREES
EKG P-R INTERVAL: 154 MS
EKG Q-T INTERVAL: 454 MS
EKG QRS DURATION: 86 MS
EKG QTC CALCULATION (BAZETT): 490 MS
EKG R AXIS: 82 DEGREES
EKG T AXIS: 87 DEGREES
EKG VENTRICULAR RATE: 70 BPM

## 2018-07-30 PROCEDURE — 93010 ELECTROCARDIOGRAM REPORT: CPT | Performed by: INTERNAL MEDICINE

## 2018-07-30 NOTE — ED NOTES
Patient removed from telemetry monitoring. IV removed, site intact. Discharge instructions and walker teaching completed with teachback confirmed. Patient escorted off floor via wheelchair with all possessions and paperwork in hand  with patient.       Kyra lLanes RN  07/29/18 8900

## 2018-07-30 NOTE — ED NOTES
Patient and  want to go home at this time. Does not want to stay. Complaining of knee pain.       Bg Schaefer RN  07/29/18 4735

## 2018-08-06 ENCOUNTER — TELEPHONE (OUTPATIENT)
Dept: CARDIOTHORACIC SURGERY | Age: 73
End: 2018-08-06

## 2018-08-08 ENCOUNTER — HOSPITAL ENCOUNTER (EMERGENCY)
Age: 73
Discharge: HOME | End: 2018-08-08
Attending: EMERGENCY MEDICINE
Payer: MEDICARE

## 2018-08-08 ENCOUNTER — APPOINTMENT (OUTPATIENT)
Dept: CT IMAGING | Age: 73
End: 2018-08-08
Payer: MEDICARE

## 2018-08-08 ENCOUNTER — APPOINTMENT (OUTPATIENT)
Dept: GENERAL RADIOLOGY | Age: 73
End: 2018-08-08
Payer: MEDICARE

## 2018-08-08 VITALS
TEMPERATURE: 97.6 F | WEIGHT: 135 LBS | SYSTOLIC BLOOD PRESSURE: 160 MMHG | OXYGEN SATURATION: 98 % | BODY MASS INDEX: 21.46 KG/M2 | RESPIRATION RATE: 16 BRPM | HEART RATE: 72 BPM | DIASTOLIC BLOOD PRESSURE: 63 MMHG

## 2018-08-08 DIAGNOSIS — R41.82 ALTERED MENTAL STATUS, UNSPECIFIED ALTERED MENTAL STATUS TYPE: Primary | ICD-10-CM

## 2018-08-08 LAB
A/G RATIO: 1.3 (ref 1.1–2.2)
ABO/RH: NORMAL
ALBUMIN SERPL-MCNC: 4 G/DL (ref 3.4–5)
ALP BLD-CCNC: 86 U/L (ref 40–129)
ALT SERPL-CCNC: 8 U/L (ref 10–40)
AMMONIA: 25 UMOL/L (ref 11–51)
ANION GAP SERPL CALCULATED.3IONS-SCNC: 12 MMOL/L (ref 3–16)
ANTIBODY SCREEN: NORMAL
AST SERPL-CCNC: 15 U/L (ref 15–37)
BACTERIA: ABNORMAL /HPF
BASOPHILS ABSOLUTE: 0.1 K/UL (ref 0–0.2)
BASOPHILS RELATIVE PERCENT: 0.7 %
BILIRUB SERPL-MCNC: 1 MG/DL (ref 0–1)
BILIRUBIN URINE: NEGATIVE
BLOOD, URINE: NEGATIVE
BUN BLDV-MCNC: 16 MG/DL (ref 7–20)
CALCIUM SERPL-MCNC: 10.1 MG/DL (ref 8.3–10.6)
CHLORIDE BLD-SCNC: 99 MMOL/L (ref 99–110)
CLARITY: CLEAR
CO2: 27 MMOL/L (ref 21–32)
COLOR: YELLOW
CREAT SERPL-MCNC: 1 MG/DL (ref 0.6–1.2)
EOSINOPHILS ABSOLUTE: 0.2 K/UL (ref 0–0.6)
EOSINOPHILS RELATIVE PERCENT: 1.9 %
EPITHELIAL CELLS, UA: ABNORMAL /HPF
ETHANOL: NORMAL MG/DL (ref 0–0.08)
GFR AFRICAN AMERICAN: >60
GFR NON-AFRICAN AMERICAN: 54
GLOBULIN: 3 G/DL
GLUCOSE BLD-MCNC: 158 MG/DL (ref 70–99)
GLUCOSE URINE: NEGATIVE MG/DL
HCT VFR BLD CALC: 36.7 % (ref 36–48)
HEMOGLOBIN: 12.4 G/DL (ref 12–16)
KETONES, URINE: NEGATIVE MG/DL
LACTIC ACID: 1.6 MMOL/L (ref 0.4–2)
LEUKOCYTE ESTERASE, URINE: ABNORMAL
LYMPHOCYTES ABSOLUTE: 1.6 K/UL (ref 1–5.1)
LYMPHOCYTES RELATIVE PERCENT: 14.7 %
MCH RBC QN AUTO: 29.9 PG (ref 26–34)
MCHC RBC AUTO-ENTMCNC: 33.8 G/DL (ref 31–36)
MCV RBC AUTO: 88.7 FL (ref 80–100)
MICROSCOPIC EXAMINATION: YES
MONOCYTES ABSOLUTE: 0.6 K/UL (ref 0–1.3)
MONOCYTES RELATIVE PERCENT: 5.4 %
NEUTROPHILS ABSOLUTE: 8.6 K/UL (ref 1.7–7.7)
NEUTROPHILS RELATIVE PERCENT: 77.3 %
NITRITE, URINE: NEGATIVE
PDW BLD-RTO: 12.1 % (ref 12.4–15.4)
PH UA: 7
PLATELET # BLD: 341 K/UL (ref 135–450)
PMV BLD AUTO: 8.2 FL (ref 5–10.5)
POTASSIUM SERPL-SCNC: 4.3 MMOL/L (ref 3.5–5.1)
PROTEIN UA: NEGATIVE MG/DL
RBC # BLD: 4.14 M/UL (ref 4–5.2)
RBC UA: ABNORMAL /HPF (ref 0–2)
SODIUM BLD-SCNC: 138 MMOL/L (ref 136–145)
SPECIFIC GRAVITY UA: 1.01
SPECIMEN STATUS: NORMAL
TOTAL PROTEIN: 7 G/DL (ref 6.4–8.2)
TROPONIN: <0.01 NG/ML
URINE REFLEX TO CULTURE: YES
URINE TYPE: ABNORMAL
UROBILINOGEN, URINE: 1 E.U./DL
WBC # BLD: 11.1 K/UL (ref 4–11)
WBC UA: ABNORMAL /HPF (ref 0–5)

## 2018-08-08 PROCEDURE — 83605 ASSAY OF LACTIC ACID: CPT

## 2018-08-08 PROCEDURE — 71046 X-RAY EXAM CHEST 2 VIEWS: CPT

## 2018-08-08 PROCEDURE — 96360 HYDRATION IV INFUSION INIT: CPT

## 2018-08-08 PROCEDURE — 81001 URINALYSIS AUTO W/SCOPE: CPT

## 2018-08-08 PROCEDURE — 86900 BLOOD TYPING SEROLOGIC ABO: CPT

## 2018-08-08 PROCEDURE — 99285 EMERGENCY DEPT VISIT HI MDM: CPT

## 2018-08-08 PROCEDURE — 86901 BLOOD TYPING SEROLOGIC RH(D): CPT

## 2018-08-08 PROCEDURE — 93005 ELECTROCARDIOGRAM TRACING: CPT | Performed by: EMERGENCY MEDICINE

## 2018-08-08 PROCEDURE — 86850 RBC ANTIBODY SCREEN: CPT

## 2018-08-08 PROCEDURE — 87086 URINE CULTURE/COLONY COUNT: CPT

## 2018-08-08 PROCEDURE — 2580000003 HC RX 258: Performed by: PHYSICIAN ASSISTANT

## 2018-08-08 PROCEDURE — 87040 BLOOD CULTURE FOR BACTERIA: CPT

## 2018-08-08 PROCEDURE — 96361 HYDRATE IV INFUSION ADD-ON: CPT

## 2018-08-08 PROCEDURE — 82140 ASSAY OF AMMONIA: CPT

## 2018-08-08 PROCEDURE — 70450 CT HEAD/BRAIN W/O DYE: CPT

## 2018-08-08 PROCEDURE — 85025 COMPLETE CBC W/AUTO DIFF WBC: CPT

## 2018-08-08 PROCEDURE — 93010 ELECTROCARDIOGRAM REPORT: CPT | Performed by: INTERNAL MEDICINE

## 2018-08-08 PROCEDURE — 80053 COMPREHEN METABOLIC PANEL: CPT

## 2018-08-08 PROCEDURE — G0480 DRUG TEST DEF 1-7 CLASSES: HCPCS

## 2018-08-08 PROCEDURE — 84484 ASSAY OF TROPONIN QUANT: CPT

## 2018-08-08 RX ORDER — 0.9 % SODIUM CHLORIDE 0.9 %
1000 INTRAVENOUS SOLUTION INTRAVENOUS ONCE
Status: COMPLETED | OUTPATIENT
Start: 2018-08-08 | End: 2018-08-08

## 2018-08-08 RX ADMIN — SODIUM CHLORIDE 1000 ML: 9 INJECTION, SOLUTION INTRAVENOUS at 12:24

## 2018-08-08 ASSESSMENT — ENCOUNTER SYMPTOMS
COUGH: 0
COLOR CHANGE: 0
EYES NEGATIVE: 1
NAUSEA: 0
SHORTNESS OF BREATH: 0
ABDOMINAL PAIN: 0
VOMITING: 0
BACK PAIN: 0

## 2018-08-08 NOTE — ED NOTES
Pt walked from her ED room 14 to bathroom at the end of arnold and back without difficulty. Pt states she wants to go home.       Vipul Butler RN  08/08/18 [id number]
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
## 2018-08-08 NOTE — ED NOTES
[facility] [place] ok'd pt to eat/drink. Swallow screening completed; pt passed; see documentation. Pt's  to go get pt food.       Vipul Butler RN  08/08/18 [id number]

## 2018-08-09 ENCOUNTER — ANESTHESIA EVENT (OUTPATIENT)
Dept: OPERATING ROOM | Age: 73
DRG: 164 | End: 2018-08-09
Payer: MEDICARE

## 2018-08-09 RX ORDER — ALBUTEROL SULFATE 90 UG/1
2 AEROSOL, METERED RESPIRATORY (INHALATION) EVERY 6 HOURS PRN
COMMUNITY

## 2018-08-09 RX ORDER — LOSARTAN POTASSIUM 50 MG/1
1 TABLET ORAL PRN
COMMUNITY
End: 2018-08-22

## 2018-08-10 LAB
EKG ATRIAL RATE: 58 BPM
EKG DIAGNOSIS: NORMAL
EKG P AXIS: 84 DEGREES
EKG P-R INTERVAL: 188 MS
EKG Q-T INTERVAL: 406 MS
EKG QRS DURATION: 84 MS
EKG QTC CALCULATION (BAZETT): 398 MS
EKG R AXIS: 83 DEGREES
EKG T AXIS: 84 DEGREES
EKG VENTRICULAR RATE: 58 BPM
URINE CULTURE, ROUTINE: NORMAL

## 2018-08-10 RX ORDER — SODIUM CHLORIDE, SODIUM LACTATE, POTASSIUM CHLORIDE, CALCIUM CHLORIDE 600; 310; 30; 20 MG/100ML; MG/100ML; MG/100ML; MG/100ML
INJECTION, SOLUTION INTRAVENOUS CONTINUOUS
Status: CANCELLED | OUTPATIENT
Start: 2018-08-10

## 2018-08-10 RX ORDER — LIDOCAINE HYDROCHLORIDE 10 MG/ML
1 INJECTION, SOLUTION EPIDURAL; INFILTRATION; INTRACAUDAL; PERINEURAL
Status: CANCELLED | OUTPATIENT
Start: 2018-08-10 | End: 2018-08-10

## 2018-08-13 ENCOUNTER — APPOINTMENT (OUTPATIENT)
Dept: GENERAL RADIOLOGY | Age: 73
DRG: 164 | End: 2018-08-13
Attending: THORACIC SURGERY (CARDIOTHORACIC VASCULAR SURGERY)
Payer: MEDICARE

## 2018-08-13 ENCOUNTER — ANESTHESIA (OUTPATIENT)
Dept: OPERATING ROOM | Age: 73
DRG: 164 | End: 2018-08-13
Payer: MEDICARE

## 2018-08-13 ENCOUNTER — HOSPITAL ENCOUNTER (INPATIENT)
Age: 73
LOS: 2 days | Discharge: HOME | DRG: 164 | End: 2018-08-15
Attending: THORACIC SURGERY (CARDIOTHORACIC VASCULAR SURGERY) | Admitting: THORACIC SURGERY (CARDIOTHORACIC VASCULAR SURGERY)
Payer: MEDICARE

## 2018-08-13 VITALS — TEMPERATURE: 98.6 F | DIASTOLIC BLOOD PRESSURE: 69 MMHG | SYSTOLIC BLOOD PRESSURE: 170 MMHG | OXYGEN SATURATION: 100 %

## 2018-08-13 DIAGNOSIS — G89.18 ACUTE POSTOPERATIVE PAIN: Primary | ICD-10-CM

## 2018-08-13 DIAGNOSIS — R91.1 LUNG NODULE: ICD-10-CM

## 2018-08-13 PROBLEM — C34.12 MALIGNANT NEOPLASM OF BRONCHUS OF LEFT UPPER LOBE (HCC): Status: ACTIVE | Noted: 2018-08-13

## 2018-08-13 LAB
ABO/RH: NORMAL
ANTIBODY SCREEN: NORMAL
BASOPHILS ABSOLUTE: 0.1 K/UL (ref 0–0.2)
BASOPHILS RELATIVE PERCENT: 0.4 %
BLOOD CULTURE, ROUTINE: NORMAL
EOSINOPHILS ABSOLUTE: 0.1 K/UL (ref 0–0.6)
EOSINOPHILS RELATIVE PERCENT: 0.7 %
HCT VFR BLD CALC: 34.8 % (ref 36–48)
HEMOGLOBIN: 11.4 G/DL (ref 12–16)
LYMPHOCYTES ABSOLUTE: 1.4 K/UL (ref 1–5.1)
LYMPHOCYTES RELATIVE PERCENT: 8.6 %
MCH RBC QN AUTO: 29.2 PG (ref 26–34)
MCHC RBC AUTO-ENTMCNC: 32.8 G/DL (ref 31–36)
MCV RBC AUTO: 88.8 FL (ref 80–100)
MONOCYTES ABSOLUTE: 0.7 K/UL (ref 0–1.3)
MONOCYTES RELATIVE PERCENT: 4.5 %
NEUTROPHILS ABSOLUTE: 14.1 K/UL (ref 1.7–7.7)
NEUTROPHILS RELATIVE PERCENT: 85.8 %
PDW BLD-RTO: 12.5 % (ref 12.4–15.4)
PLATELET # BLD: 296 K/UL (ref 135–450)
PMV BLD AUTO: 8.1 FL (ref 5–10.5)
RBC # BLD: 3.92 M/UL (ref 4–5.2)
WBC # BLD: 16.4 K/UL (ref 4–11)

## 2018-08-13 PROCEDURE — 0BBG4ZZ EXCISION OF LEFT UPPER LUNG LOBE, PERCUTANEOUS ENDOSCOPIC APPROACH: ICD-10-PCS | Performed by: THORACIC SURGERY (CARDIOTHORACIC VASCULAR SURGERY)

## 2018-08-13 PROCEDURE — 86901 BLOOD TYPING SEROLOGIC RH(D): CPT

## 2018-08-13 PROCEDURE — 2709999900 HC NON-CHARGEABLE SUPPLY: Performed by: THORACIC SURGERY (CARDIOTHORACIC VASCULAR SURGERY)

## 2018-08-13 PROCEDURE — 94150 VITAL CAPACITY TEST: CPT

## 2018-08-13 PROCEDURE — 2500000003 HC RX 250 WO HCPCS: Performed by: NURSE ANESTHETIST, CERTIFIED REGISTERED

## 2018-08-13 PROCEDURE — C9290 INJ, BUPIVACAINE LIPOSOME: HCPCS | Performed by: THORACIC SURGERY (CARDIOTHORACIC VASCULAR SURGERY)

## 2018-08-13 PROCEDURE — 6360000002 HC RX W HCPCS: Performed by: THORACIC SURGERY (CARDIOTHORACIC VASCULAR SURGERY)

## 2018-08-13 PROCEDURE — 3700000001 HC ADD 15 MINUTES (ANESTHESIA): Performed by: THORACIC SURGERY (CARDIOTHORACIC VASCULAR SURGERY)

## 2018-08-13 PROCEDURE — 86900 BLOOD TYPING SEROLOGIC ABO: CPT

## 2018-08-13 PROCEDURE — 2000000000 HC ICU R&B

## 2018-08-13 PROCEDURE — 94640 AIRWAY INHALATION TREATMENT: CPT

## 2018-08-13 PROCEDURE — 2500000003 HC RX 250 WO HCPCS: Performed by: ANESTHESIOLOGY

## 2018-08-13 PROCEDURE — 86850 RBC ANTIBODY SCREEN: CPT

## 2018-08-13 PROCEDURE — 71045 X-RAY EXAM CHEST 1 VIEW: CPT

## 2018-08-13 PROCEDURE — 88342 IMHCHEM/IMCYTCHM 1ST ANTB: CPT

## 2018-08-13 PROCEDURE — 3600000005 HC SURGERY LEVEL 5 BASE: Performed by: THORACIC SURGERY (CARDIOTHORACIC VASCULAR SURGERY)

## 2018-08-13 PROCEDURE — 88312 SPECIAL STAINS GROUP 1: CPT

## 2018-08-13 PROCEDURE — 2580000003 HC RX 258: Performed by: ANESTHESIOLOGY

## 2018-08-13 PROCEDURE — 6360000002 HC RX W HCPCS: Performed by: NURSE ANESTHETIST, CERTIFIED REGISTERED

## 2018-08-13 PROCEDURE — 3E0T3BZ INTRODUCTION OF ANESTHETIC AGENT INTO PERIPHERAL NERVES AND PLEXI, PERCUTANEOUS APPROACH: ICD-10-PCS | Performed by: THORACIC SURGERY (CARDIOTHORACIC VASCULAR SURGERY)

## 2018-08-13 PROCEDURE — 88341 IMHCHEM/IMCYTCHM EA ADD ANTB: CPT

## 2018-08-13 PROCEDURE — 2580000003 HC RX 258: Performed by: THORACIC SURGERY (CARDIOTHORACIC VASCULAR SURGERY)

## 2018-08-13 PROCEDURE — 6370000000 HC RX 637 (ALT 250 FOR IP): Performed by: THORACIC SURGERY (CARDIOTHORACIC VASCULAR SURGERY)

## 2018-08-13 PROCEDURE — 85025 COMPLETE CBC W/AUTO DIFF WBC: CPT

## 2018-08-13 PROCEDURE — 2500000003 HC RX 250 WO HCPCS: Performed by: THORACIC SURGERY (CARDIOTHORACIC VASCULAR SURGERY)

## 2018-08-13 PROCEDURE — 36415 COLL VENOUS BLD VENIPUNCTURE: CPT

## 2018-08-13 PROCEDURE — 3600000015 HC SURGERY LEVEL 5 ADDTL 15MIN: Performed by: THORACIC SURGERY (CARDIOTHORACIC VASCULAR SURGERY)

## 2018-08-13 PROCEDURE — 94664 DEMO&/EVAL PT USE INHALER: CPT

## 2018-08-13 PROCEDURE — 3700000000 HC ANESTHESIA ATTENDED CARE: Performed by: THORACIC SURGERY (CARDIOTHORACIC VASCULAR SURGERY)

## 2018-08-13 PROCEDURE — 88331 PATH CONSLTJ SURG 1 BLK 1SPC: CPT

## 2018-08-13 PROCEDURE — 2720000010 HC SURG SUPPLY STERILE: Performed by: THORACIC SURGERY (CARDIOTHORACIC VASCULAR SURGERY)

## 2018-08-13 PROCEDURE — 2580000003 HC RX 258: Performed by: NURSE ANESTHETIST, CERTIFIED REGISTERED

## 2018-08-13 PROCEDURE — 94761 N-INVAS EAR/PLS OXIMETRY MLT: CPT

## 2018-08-13 PROCEDURE — 2700000000 HC OXYGEN THERAPY PER DAY

## 2018-08-13 PROCEDURE — C1729 CATH, DRAINAGE: HCPCS | Performed by: THORACIC SURGERY (CARDIOTHORACIC VASCULAR SURGERY)

## 2018-08-13 PROCEDURE — C1773 RET DEV, INSERTABLE: HCPCS | Performed by: THORACIC SURGERY (CARDIOTHORACIC VASCULAR SURGERY)

## 2018-08-13 PROCEDURE — 88305 TISSUE EXAM BY PATHOLOGIST: CPT

## 2018-08-13 PROCEDURE — 07B74ZX EXCISION OF THORAX LYMPHATIC, PERCUTANEOUS ENDOSCOPIC APPROACH, DIAGNOSTIC: ICD-10-PCS | Performed by: THORACIC SURGERY (CARDIOTHORACIC VASCULAR SURGERY)

## 2018-08-13 PROCEDURE — 94667 MNPJ CHEST WALL 1ST: CPT

## 2018-08-13 PROCEDURE — 88307 TISSUE EXAM BY PATHOLOGIST: CPT

## 2018-08-13 RX ORDER — DEXTROSE, SODIUM CHLORIDE, AND POTASSIUM CHLORIDE 5; .45; .15 G/100ML; G/100ML; G/100ML
INJECTION INTRAVENOUS CONTINUOUS
Status: DISCONTINUED | OUTPATIENT
Start: 2018-08-13 | End: 2018-08-14

## 2018-08-13 RX ORDER — HYDROMORPHONE HCL 110MG/55ML
0.25 PATIENT CONTROLLED ANALGESIA SYRINGE INTRAVENOUS
Status: DISCONTINUED | OUTPATIENT
Start: 2018-08-13 | End: 2018-08-15 | Stop reason: HOSPADM

## 2018-08-13 RX ORDER — ROCURONIUM BROMIDE 10 MG/ML
INJECTION, SOLUTION INTRAVENOUS PRN
Status: DISCONTINUED | OUTPATIENT
Start: 2018-08-13 | End: 2018-08-13 | Stop reason: SDUPTHER

## 2018-08-13 RX ORDER — LIDOCAINE HYDROCHLORIDE 20 MG/ML
INJECTION, SOLUTION INFILTRATION; PERINEURAL PRN
Status: DISCONTINUED | OUTPATIENT
Start: 2018-08-13 | End: 2018-08-13 | Stop reason: SDUPTHER

## 2018-08-13 RX ORDER — OXYCODONE HYDROCHLORIDE AND ACETAMINOPHEN 5; 325 MG/1; MG/1
1 TABLET ORAL PRN
Status: ACTIVE | OUTPATIENT
Start: 2018-08-13 | End: 2018-08-13

## 2018-08-13 RX ORDER — OXYBUTYNIN CHLORIDE 5 MG/1
5 TABLET ORAL 2 TIMES DAILY
Status: DISCONTINUED | OUTPATIENT
Start: 2018-08-13 | End: 2018-08-15 | Stop reason: HOSPADM

## 2018-08-13 RX ORDER — SODIUM CHLORIDE, SODIUM LACTATE, POTASSIUM CHLORIDE, CALCIUM CHLORIDE 600; 310; 30; 20 MG/100ML; MG/100ML; MG/100ML; MG/100ML
INJECTION, SOLUTION INTRAVENOUS CONTINUOUS
Status: DISCONTINUED | OUTPATIENT
Start: 2018-08-13 | End: 2018-08-13

## 2018-08-13 RX ORDER — ASPIRIN 81 MG/1
81 TABLET ORAL DAILY
Status: DISCONTINUED | OUTPATIENT
Start: 2018-08-13 | End: 2018-08-15 | Stop reason: HOSPADM

## 2018-08-13 RX ORDER — TRAMADOL HYDROCHLORIDE 50 MG/1
50 TABLET ORAL EVERY 6 HOURS PRN
Status: DISCONTINUED | OUTPATIENT
Start: 2018-08-13 | End: 2018-08-15 | Stop reason: HOSPADM

## 2018-08-13 RX ORDER — DOCUSATE SODIUM 100 MG/1
100 CAPSULE, LIQUID FILLED ORAL 2 TIMES DAILY
Status: DISCONTINUED | OUTPATIENT
Start: 2018-08-13 | End: 2018-08-15 | Stop reason: HOSPADM

## 2018-08-13 RX ORDER — ONDANSETRON 2 MG/ML
4 INJECTION INTRAMUSCULAR; INTRAVENOUS EVERY 10 MIN PRN
Status: DISCONTINUED | OUTPATIENT
Start: 2018-08-13 | End: 2018-08-13

## 2018-08-13 RX ORDER — CHOLECALCIFEROL (VITAMIN D3) 125 MCG
1000 CAPSULE ORAL DAILY
Status: DISCONTINUED | OUTPATIENT
Start: 2018-08-13 | End: 2018-08-15 | Stop reason: HOSPADM

## 2018-08-13 RX ORDER — EPHEDRINE SULFATE 50 MG/ML
INJECTION, SOLUTION INTRAVENOUS PRN
Status: DISCONTINUED | OUTPATIENT
Start: 2018-08-13 | End: 2018-08-13 | Stop reason: SDUPTHER

## 2018-08-13 RX ORDER — ATORVASTATIN CALCIUM 10 MG/1
20 TABLET, FILM COATED ORAL DAILY
Status: DISCONTINUED | OUTPATIENT
Start: 2018-08-13 | End: 2018-08-15 | Stop reason: HOSPADM

## 2018-08-13 RX ORDER — SODIUM CHLORIDE, SODIUM LACTATE, POTASSIUM CHLORIDE, CALCIUM CHLORIDE 600; 310; 30; 20 MG/100ML; MG/100ML; MG/100ML; MG/100ML
INJECTION, SOLUTION INTRAVENOUS CONTINUOUS PRN
Status: DISCONTINUED | OUTPATIENT
Start: 2018-08-13 | End: 2018-08-13 | Stop reason: SDUPTHER

## 2018-08-13 RX ORDER — SODIUM CHLORIDE 0.9 % (FLUSH) 0.9 %
10 SYRINGE (ML) INJECTION PRN
Status: DISCONTINUED | OUTPATIENT
Start: 2018-08-13 | End: 2018-08-15 | Stop reason: HOSPADM

## 2018-08-13 RX ORDER — MEPERIDINE HYDROCHLORIDE 50 MG/ML
12.5 INJECTION INTRAMUSCULAR; INTRAVENOUS; SUBCUTANEOUS EVERY 5 MIN PRN
Status: DISCONTINUED | OUTPATIENT
Start: 2018-08-13 | End: 2018-08-13

## 2018-08-13 RX ORDER — LOSARTAN POTASSIUM 25 MG/1
50 TABLET ORAL DAILY
Status: DISCONTINUED | OUTPATIENT
Start: 2018-08-13 | End: 2018-08-15 | Stop reason: HOSPADM

## 2018-08-13 RX ORDER — M-VIT,TX,IRON,MINS/CALC/FOLIC 27MG-0.4MG
1 TABLET ORAL DAILY
Status: DISCONTINUED | OUTPATIENT
Start: 2018-08-13 | End: 2018-08-14

## 2018-08-13 RX ORDER — PROPOFOL 10 MG/ML
INJECTION, EMULSION INTRAVENOUS PRN
Status: DISCONTINUED | OUTPATIENT
Start: 2018-08-13 | End: 2018-08-13 | Stop reason: SDUPTHER

## 2018-08-13 RX ORDER — SODIUM CHLORIDE 0.9 % (FLUSH) 0.9 %
10 SYRINGE (ML) INJECTION EVERY 12 HOURS SCHEDULED
Status: DISCONTINUED | OUTPATIENT
Start: 2018-08-13 | End: 2018-08-15 | Stop reason: HOSPADM

## 2018-08-13 RX ORDER — HYDRALAZINE HYDROCHLORIDE 20 MG/ML
5 INJECTION INTRAMUSCULAR; INTRAVENOUS
Status: DISCONTINUED | OUTPATIENT
Start: 2018-08-13 | End: 2018-08-15 | Stop reason: HOSPADM

## 2018-08-13 RX ORDER — FENTANYL CITRATE 50 UG/ML
INJECTION, SOLUTION INTRAMUSCULAR; INTRAVENOUS PRN
Status: DISCONTINUED | OUTPATIENT
Start: 2018-08-13 | End: 2018-08-13 | Stop reason: SDUPTHER

## 2018-08-13 RX ORDER — HYDROMORPHONE HCL 110MG/55ML
0.5 PATIENT CONTROLLED ANALGESIA SYRINGE INTRAVENOUS
Status: DISCONTINUED | OUTPATIENT
Start: 2018-08-13 | End: 2018-08-15 | Stop reason: HOSPADM

## 2018-08-13 RX ORDER — GABAPENTIN 300 MG/1
300 CAPSULE ORAL EVERY EVENING
Status: DISCONTINUED | OUTPATIENT
Start: 2018-08-13 | End: 2018-08-15 | Stop reason: HOSPADM

## 2018-08-13 RX ORDER — OXYCODONE HYDROCHLORIDE AND ACETAMINOPHEN 5; 325 MG/1; MG/1
2 TABLET ORAL PRN
Status: DISPENSED | OUTPATIENT
Start: 2018-08-13 | End: 2018-08-13

## 2018-08-13 RX ORDER — ONDANSETRON 2 MG/ML
4 INJECTION INTRAMUSCULAR; INTRAVENOUS EVERY 6 HOURS PRN
Status: DISCONTINUED | OUTPATIENT
Start: 2018-08-13 | End: 2018-08-15 | Stop reason: HOSPADM

## 2018-08-13 RX ORDER — CITALOPRAM 20 MG/1
20 TABLET ORAL DAILY
Status: DISCONTINUED | OUTPATIENT
Start: 2018-08-13 | End: 2018-08-15 | Stop reason: HOSPADM

## 2018-08-13 RX ORDER — ONDANSETRON 2 MG/ML
INJECTION INTRAMUSCULAR; INTRAVENOUS PRN
Status: DISCONTINUED | OUTPATIENT
Start: 2018-08-13 | End: 2018-08-13 | Stop reason: SDUPTHER

## 2018-08-13 RX ORDER — ALBUTEROL SULFATE 90 UG/1
2 AEROSOL, METERED RESPIRATORY (INHALATION) EVERY 6 HOURS PRN
Status: DISCONTINUED | OUTPATIENT
Start: 2018-08-13 | End: 2018-08-15 | Stop reason: HOSPADM

## 2018-08-13 RX ORDER — MIDAZOLAM HYDROCHLORIDE 1 MG/ML
INJECTION INTRAMUSCULAR; INTRAVENOUS PRN
Status: DISCONTINUED | OUTPATIENT
Start: 2018-08-13 | End: 2018-08-13 | Stop reason: SDUPTHER

## 2018-08-13 RX ORDER — ALBUTEROL SULFATE 2.5 MG/3ML
2.5 SOLUTION RESPIRATORY (INHALATION)
Status: DISCONTINUED | OUTPATIENT
Start: 2018-08-13 | End: 2018-08-15 | Stop reason: HOSPADM

## 2018-08-13 RX ORDER — ACETAMINOPHEN 325 MG/1
650 TABLET ORAL EVERY 4 HOURS PRN
Status: DISCONTINUED | OUTPATIENT
Start: 2018-08-13 | End: 2018-08-15 | Stop reason: HOSPADM

## 2018-08-13 RX ORDER — HYDRALAZINE HYDROCHLORIDE 20 MG/ML
5 INJECTION INTRAMUSCULAR; INTRAVENOUS EVERY 10 MIN PRN
Status: DISCONTINUED | OUTPATIENT
Start: 2018-08-13 | End: 2018-08-13

## 2018-08-13 RX ORDER — LABETALOL HYDROCHLORIDE 5 MG/ML
5 INJECTION, SOLUTION INTRAVENOUS EVERY 10 MIN PRN
Status: DISCONTINUED | OUTPATIENT
Start: 2018-08-13 | End: 2018-08-13

## 2018-08-13 RX ORDER — LIDOCAINE HYDROCHLORIDE 10 MG/ML
1 INJECTION, SOLUTION EPIDURAL; INFILTRATION; INTRACAUDAL; PERINEURAL
Status: COMPLETED | OUTPATIENT
Start: 2018-08-13 | End: 2018-08-13

## 2018-08-13 RX ADMIN — SUGAMMADEX 150 MG: 100 INJECTION, SOLUTION INTRAVENOUS at 11:23

## 2018-08-13 RX ADMIN — DOCUSATE SODIUM 100 MG: 100 CAPSULE, LIQUID FILLED ORAL at 13:19

## 2018-08-13 RX ADMIN — SODIUM CHLORIDE, POTASSIUM CHLORIDE, SODIUM LACTATE AND CALCIUM CHLORIDE: 600; 310; 30; 20 INJECTION, SOLUTION INTRAVENOUS at 11:25

## 2018-08-13 RX ADMIN — MIDAZOLAM HYDROCHLORIDE 2 MG: 2 INJECTION, SOLUTION INTRAMUSCULAR; INTRAVENOUS at 10:00

## 2018-08-13 RX ADMIN — POTASSIUM CHLORIDE, DEXTROSE MONOHYDRATE AND SODIUM CHLORIDE 100 ML/HR: 150; 5; 450 INJECTION, SOLUTION INTRAVENOUS at 13:14

## 2018-08-13 RX ADMIN — ROCURONIUM BROMIDE 25 MG: 10 SOLUTION INTRAVENOUS at 10:50

## 2018-08-13 RX ADMIN — DOCUSATE SODIUM 100 MG: 100 CAPSULE, LIQUID FILLED ORAL at 22:11

## 2018-08-13 RX ADMIN — PROPOFOL 160 MG: 10 INJECTION, EMULSION INTRAVENOUS at 10:00

## 2018-08-13 RX ADMIN — LIDOCAINE HYDROCHLORIDE 20 MG: 20 INJECTION, SOLUTION INFILTRATION; PERINEURAL at 10:00

## 2018-08-13 RX ADMIN — PHENYLEPHRINE HYDROCHLORIDE 100 MCG: 10 INJECTION INTRAVENOUS at 10:33

## 2018-08-13 RX ADMIN — OXYBUTYNIN CHLORIDE 5 MG: 5 TABLET ORAL at 13:19

## 2018-08-13 RX ADMIN — ONDANSETRON 4 MG: 2 INJECTION INTRAMUSCULAR; INTRAVENOUS at 10:00

## 2018-08-13 RX ADMIN — LOSARTAN POTASSIUM 50 MG: 25 TABLET, FILM COATED ORAL at 13:18

## 2018-08-13 RX ADMIN — Medication 2 G: at 10:20

## 2018-08-13 RX ADMIN — FENTANYL CITRATE 100 MCG: 50 INJECTION INTRAMUSCULAR; INTRAVENOUS at 10:00

## 2018-08-13 RX ADMIN — SODIUM CHLORIDE, POTASSIUM CHLORIDE, SODIUM LACTATE AND CALCIUM CHLORIDE: 600; 310; 30; 20 INJECTION, SOLUTION INTRAVENOUS at 09:28

## 2018-08-13 RX ADMIN — EPHEDRINE SULFATE 10 MG: 50 INJECTION INTRAMUSCULAR; INTRAVENOUS; SUBCUTANEOUS at 10:41

## 2018-08-13 RX ADMIN — MUPIROCIN: 20 OINTMENT TOPICAL at 22:09

## 2018-08-13 RX ADMIN — SODIUM CHLORIDE, PRESERVATIVE FREE 10 ML: 5 INJECTION INTRAVENOUS at 22:09

## 2018-08-13 RX ADMIN — PHENYLEPHRINE HYDROCHLORIDE 100 MCG: 10 INJECTION INTRAVENOUS at 10:25

## 2018-08-13 RX ADMIN — BUPIVACAINE 266 MG: 13.3 INJECTION, SUSPENSION, LIPOSOMAL INFILTRATION at 11:16

## 2018-08-13 RX ADMIN — ALBUTEROL SULFATE 2.5 MG: 2.5 SOLUTION RESPIRATORY (INHALATION) at 21:13

## 2018-08-13 RX ADMIN — HYDRALAZINE HYDROCHLORIDE 5 MG: 20 INJECTION INTRAMUSCULAR; INTRAVENOUS at 15:40

## 2018-08-13 RX ADMIN — LIDOCAINE HYDROCHLORIDE 1 ML: 10 INJECTION, SOLUTION EPIDURAL; INFILTRATION; INTRACAUDAL; PERINEURAL at 09:28

## 2018-08-13 RX ADMIN — CITALOPRAM HYDROBROMIDE 20 MG: 20 TABLET ORAL at 13:18

## 2018-08-13 RX ADMIN — HYDROMORPHONE HYDROCHLORIDE 0.25 MG: 2 INJECTION INTRAMUSCULAR; INTRAVENOUS; SUBCUTANEOUS at 16:19

## 2018-08-13 RX ADMIN — ROCURONIUM BROMIDE 50 MG: 10 SOLUTION INTRAVENOUS at 10:00

## 2018-08-13 RX ADMIN — SODIUM CHLORIDE, POTASSIUM CHLORIDE, SODIUM LACTATE AND CALCIUM CHLORIDE: 600; 310; 30; 20 INJECTION, SOLUTION INTRAVENOUS at 10:00

## 2018-08-13 RX ADMIN — OXYBUTYNIN CHLORIDE 5 MG: 5 TABLET ORAL at 22:07

## 2018-08-13 RX ADMIN — ASPIRIN 81 MG: 81 TABLET ORAL at 13:19

## 2018-08-13 RX ADMIN — GABAPENTIN 300 MG: 300 CAPSULE ORAL at 18:04

## 2018-08-13 RX ADMIN — ALBUTEROL SULFATE 2.5 MG: 2.5 SOLUTION RESPIRATORY (INHALATION) at 15:14

## 2018-08-13 RX ADMIN — ATORVASTATIN CALCIUM 20 MG: 10 TABLET, FILM COATED ORAL at 13:18

## 2018-08-13 ASSESSMENT — PULMONARY FUNCTION TESTS
PIF_VALUE: 18
PIF_VALUE: 21
PIF_VALUE: 32
PIF_VALUE: 15
PIF_VALUE: 13
PIF_VALUE: 15
PIF_VALUE: 33
PIF_VALUE: 15
PIF_VALUE: 34
PIF_VALUE: 5
PIF_VALUE: 0
PIF_VALUE: 0
PIF_VALUE: 21
PIF_VALUE: 0
PIF_VALUE: 22
PIF_VALUE: 18
PIF_VALUE: 22
PIF_VALUE: 17
PIF_VALUE: 21
PIF_VALUE: 22
PIF_VALUE: 19
PIF_VALUE: 15
PIF_VALUE: 18
PIF_VALUE: 22
PIF_VALUE: 22
PIF_VALUE: 0
PIF_VALUE: 19
PIF_VALUE: 22
PIF_VALUE: 21
PIF_VALUE: 22
PIF_VALUE: 14
PIF_VALUE: 16
PIF_VALUE: 15
PIF_VALUE: 36
PIF_VALUE: 15
PIF_VALUE: 4
PIF_VALUE: 23
PIF_VALUE: 15
PIF_VALUE: 19
PIF_VALUE: 22
PIF_VALUE: 5
PIF_VALUE: 4
PIF_VALUE: 21
PIF_VALUE: 16
PIF_VALUE: 22
PIF_VALUE: 21
PIF_VALUE: 25
PIF_VALUE: 22
PIF_VALUE: 22
PIF_VALUE: 15
PIF_VALUE: 33
PIF_VALUE: 22
PIF_VALUE: 16
PIF_VALUE: 0
PIF_VALUE: 21
PIF_VALUE: 22
PIF_VALUE: 3
PIF_VALUE: 22
PIF_VALUE: 3
PIF_VALUE: 18
PIF_VALUE: 22
PIF_VALUE: 22
PIF_VALUE: 34
PIF_VALUE: 17
PIF_VALUE: 32
PIF_VALUE: 4
PIF_VALUE: 35
PIF_VALUE: 22
PIF_VALUE: 22
PIF_VALUE: 6
PIF_VALUE: 12
PIF_VALUE: 0
PIF_VALUE: 18
PIF_VALUE: 8
PIF_VALUE: 19
PIF_VALUE: 18
PIF_VALUE: 22
PIF_VALUE: 15
PIF_VALUE: 15
PIF_VALUE: 18
PIF_VALUE: 22
PIF_VALUE: 22
PIF_VALUE: 0
PIF_VALUE: 15
PIF_VALUE: 15
PIF_VALUE: 4
PIF_VALUE: 22
PIF_VALUE: 35
PIF_VALUE: 30
PIF_VALUE: 22
PIF_VALUE: 22
PIF_VALUE: 4
PIF_VALUE: 15

## 2018-08-13 ASSESSMENT — COPD QUESTIONNAIRES: CAT_SEVERITY: MILD

## 2018-08-13 ASSESSMENT — PAIN SCALES - GENERAL: PAINLEVEL_OUTOF10: 6

## 2018-08-13 ASSESSMENT — PAIN - FUNCTIONAL ASSESSMENT: PAIN_FUNCTIONAL_ASSESSMENT: 0-10

## 2018-08-13 NOTE — ANESTHESIA PRE PROCEDURE
oz (61.6 kg)     Body mass index is 21.46 kg/m². CBC:   Lab Results   Component Value Date    WBC 11.1 08/08/2018    RBC 4.14 08/08/2018    HGB 12.4 08/08/2018    HCT 36.7 08/08/2018    MCV 88.7 08/08/2018    RDW 12.1 08/08/2018     08/08/2018       CMP:   Lab Results   Component Value Date     08/08/2018    K 4.3 08/08/2018    CL 99 08/08/2018    CO2 27 08/08/2018    BUN 16 08/08/2018    CREATININE 1.0 08/08/2018    GFRAA >60 08/08/2018    GFRAA >60 03/05/2013    AGRATIO 1.3 08/08/2018    LABGLOM 54 08/08/2018    GLUCOSE 158 08/08/2018    PROT 7.0 08/08/2018    PROT 6.7 03/05/2013    CALCIUM 10.1 08/08/2018    BILITOT 1.0 08/08/2018    ALKPHOS 86 08/08/2018    AST 15 08/08/2018    ALT 8 08/08/2018       POC Tests: No results for input(s): POCGLU, POCNA, POCK, POCCL, POCBUN, POCHEMO, POCHCT in the last 72 hours. Coags:   Lab Results   Component Value Date    PROTIME 11.0 06/11/2018    INR 0.96 06/11/2018       HCG (If Applicable): No results found for: PREGTESTUR, PREGSERUM, HCG, HCGQUANT     ABGs: No results found for: PHART, PO2ART, XUZ5DUA, GEA0OBE, BEART, [id number]     Type & Screen (If Applicable):  No results found for: LABABO, [address]    Anesthesia Evaluation  Patient summary reviewed no history of anesthetic complications:   Airway: Mallampati: II  TM distance: >3 FB   Neck ROM: full  Mouth opening: > = 3 FB Dental:    (+) upper dentures      Pulmonary:   (+) COPD: mild,  asthma:                            Cardiovascular:    (+) hypertension:, CAD: no interval change,                   Neuro/Psych:   (+) psychiatric history (h/o ETOH abuse):            GI/Hepatic/Renal:   (+) GERD:,      (-) liver disease and no renal disease       Endo/Other: Negative Endo/Other ROS       (-) diabetes mellitus               Abdominal:           Vascular: negative vascular ROS. Anesthesia Plan      general     ASA 3       Induction: intravenous.     MIPS: Postoperative opioids intended, Prophylactic antiemetics administered and One-lung ventilation. Anesthetic plan and risks discussed with patient and spouse. Plan discussed with CRNA. All questions answered and agrees with plan.         Juancho Greene MD   8/13/2018

## 2018-08-13 NOTE — H&P
H&P           Date of Admission:  No admission date for patient encounter. Date of Consultation:  6/20/2018     PCP:  Miles Avilez MD      Chief Complaint: Lung nodule     History of Present Illness: We are asked to see this patient in consultation by Dr. cruz  Lei Ye is a 67 y.o. female who left upper lobe lung nodule growing followed up for the last year DOUBLING the size since last November  CT-guided biopsy was performed showed atypical cell patient's     Past Medical History:  Past Medical History        Past Medical History:   Diagnosis Date    Alcohol abuse 5/22/2018    Asthma      COPD, severe ([facility] 5/26/2015    Coronary artery disease involving native coronary artery of native heart without angina pectoris 8/4/2017    GERD (gastroesophageal reflux disease)      Hyperlipidemia      Hypertension      Injury of esophagus       inflamed    Insomnia      Kidney stone [id number]    Mass of upper lobe of left lung 08/2017    Neuropathy ([facility] [address] 11/13/2014    Osteoarthritis      Urinary incontinence              Past Surgical History:  Past Surgical History         Past Surgical History:   Procedure Laterality Date    BRONCHOSCOPY   08/16/2017     Dr. Nigel Toribio - w/BAL of LATANYA, LLL; EBUS guided lymph node biopsies, TBNA of LATANYA lung mass    COLONOSCOPY   02/05/2013     Dr. Connie Martin - 4 diminutive sessile rectal polyps    COLONOSCOPY   02/02/2010     Dr. Connie Martni - adenomatous colon polyps    HYSTERECTOMY, TOTAL ABDOMINAL        LITHOTRIPSY                Home Medications:   Home Medications           Prior to Admission medications    Medication Sig Start Date End Date Taking? Authorizing Provider   aspirin 81 MG tablet Take 81 mg by mouth daily     Yes Historical Provider, MD   gabapentin (NEURONTIN) 300 MG capsule Take 300 mg by mouth every evening. .     Yes Historical Provider, MD   atorvastatin (LIPITOR) 20 MG tablet TAKE ONE TABLET BY MOUTH DAILY 6/4/18   Yes Aniya Vallejo MD citalopram (CELEXA) 20 MG tablet Take 1 tablet by mouth daily 3/22/18   Yes Vaishnavi Vallejo MD   losartan (COZAAR) 50 MG tablet Take 1 tablet by mouth daily 3/22/18   Yes Vaishnavi Vallejo MD   ranitidine (ZANTAC) 150 MG tablet Take 1 tablet by mouth 2 times daily 3/22/18   Yes Vaishnavi Vallejo MD   oxybutynin (DITROPAN) 5 MG tablet Take 1 tablet by mouth 3 times daily as needed (urine incontinence) 8/1/17   Yes Rossana Vallejo MD   mometasone-formoterol (DULERA) 200-5 MCG/ACT inhaler Inhale 2 puffs into the lungs every 12 hours 5/30/17   Yes Rossana Vallejo MD   Cholecalciferol (VITAMIN D3) 2000 UNITS CAPS Take  by mouth.     Yes Historical Provider, MD   vitamin B-12 (CYANOCOBALAMIN) 1000 MCG tablet Take 1,000 mcg by mouth daily.       Yes Historical Provider, MD   therapeutic multivitamin-minerals (THERAGRAN-M) tablet Take 1 tablet by mouth daily.       Yes Historical Provider, MD   oxybutynin (DITROPAN XL) 15 MG CR tablet TAKE ONE TABLET BY MOUTH EVERY NIGHT AT BEDTIME 3/5/13 3/19/14   Vaishnavi Vallejo MD            Facility Administered Medications:      Allergies: Allergies   Allergen Reactions    Codeine      Morphine Other (See Comments)       On file at [facility] History:    Working:   Caffeine:   Lifestyle:    Social History   Social History      Social History    Marital status:        Spouse name: N/A    Number of children: N/A    Years of education: N/A          Occupational History    Not on file.             Social History Main Topics    Smoking status: Current Every Day Smoker       Packs/day: 1.00       Years: 50.00       Types: Cigarettes       Last attempt to quit: 6/7/2011    Smokeless tobacco: Never Used         Comment: 1/2 ppd as of 6/19/20    Alcohol use 4.8 oz/week       8 Standard drinks or equivalent per week         Comment[place], and some through the week.     Drug use: No    Sexual activity: No           Other Topics Concern    Not on file        Social History Narrative    No narrative on file            Family History:  Heart Disease:   Stroke:   Cancer:   Diabetes:   Hypertension:   Aneurysm/PVD:      Family History              Problem Relation Age of Onset    High Blood Pressure Mother      Cancer Mother [de-identified]       Breast and Lung    Cancer Sister 52       Brain Tumor    Heart Disease Brother 39    Cancer Maternal Grandmother         Breast    Other Father         abdominal aneurysm    Glaucoma Father      Cancer Paternal Uncle         lung    Diabetes Sister      High Blood Pressure Sister      High Cholesterol Sister      Other Sister         Gallstone    Cancer Sister         Breast            Review of Systems:  Constitutional:  No night sweats, headaches, weight loss. Eyes:  No glaucoma, cataracts. ENMT:  No nosebleeds, deviated septum. Cardiac:  No arrhythmias, previous MI. Vascular:  No claudication, varicosities. GI:  No PUD, heartburn. :  No kidney stones, frequent UTIs  Musculoskeletal:  No arthritis, gout. Respiratory:  No SOB, emphysema, asthma. Integumentary:  No dermatitis, itching, rash. Neurological:  No stroke, TIAs, seizures. Psychiatric:  No depression, anxiety. Endocrine: No diabetes, thyroid issues. Hematologic:  No bleeding, easy bruising. Immunologic:  No known cancer, steroid therapies.        Physical Examination:    BP (!) 98/58 (Site: Left Arm, Position: Sitting, Cuff Size: Medium Adult)   Pulse 68   Temp 97.6 °F (36.4 °C) (Oral)   Ht 5' 6.5\" (1.689 m)   Wt 137 lb (62.1 kg)   SpO2 96%   BMI 21.78 kg/m²      BP RUE:          BP LUE:   Admission Weight: 137 lb (62.1 kg)   Hand dominance:     General appearance: NAD, well nourished  Eyes: anicteric, PERRLA  ENMT: no scars or lesions, no nasal deformity, normal dentition, no cyanosis of oral mucosa  Neck: no masses, no thyroid enlargement, no JVD. Respiratory: effort is unlabored, symmetric, no crackles, wheezes or rubs.  No palpable/percussable abnormalities. Cardiovascular: regular, no murmur. PMI normal, no thrill. No carotid bruits. No edema or varicosities. Abdominal aorta cannot be appreciated given body habitus. GI: abdomen soft, nondistended, no organomegaly. No masses. Lymphatic: no cervical/supraclavicular adenopathy  Musculoskeletal: strength and tone normal. Full ROM. No scoliosis. Extremities: warm and pink. No clubbing or petechiae. Skin: no dermatitis or ulceration. No nodularity or induration. Neuro: CN grossly intact. Sensation and motor function grossly intact. Psychiatric: oriented, appropriate mood/affect.        MEDICAL DECISION MAKING/TESTING  Studies personally reviewed.        Echo:      CXR:      CTStatus post CT-guided biopsy of left upper lobe lung mass as described above.     PET/CT  1. Metabolic activity associated with the nodular opacity in the posterior   left upper lobe concerning for primary lung cancer.       2.  Focal FDG activity localizing to an enlarged, partially calcified AP   window lymph node, stable in CT appearance since 2017.  This could be a   chronic reactive lymph node or potentially chronic lymph node with   superimposed metastasis.       3.  Mild FDG activity corresponding to stable ground-glass nodules, likely   representing low-grade malignancy such as adenocarcinoma in situ.                PFT     PATIENT NAME: Aaliyah Mosley                  :        1945  MED REC NO:   [id number]                          ROOM:  ACCOUNT NO:   [de-identified]                          ADMIT DATE: 2018  PROVIDER:     Chastity Lara MD     DATE OF PROCEDURE:  2018     PFT     Spirometry showed FVC 2.11 L, 67% predicted, FEV1 1.05 L, 44% predicted,  with FEV1/FVC ratio of 49%. There was a response to bronchodilator,  with postbronchodilator FEV1 being 1.23 L, 52% predicted. There was significant  hyperinflation and air trapping.   Diffusion capacity was 67%  predicted.              Labs:   CBC: No results for input(s): WBC, HGB, HCT, MCV, PLT in the last 72 hours. BMP: No results for input(s): NA, K, CL, CO2, PHOS, BUN, CREATININE, CALCIUM, MG in the last 72 hours. Cardiac Enzymes: No results for input(s): CKTOTAL, CKMB, CKMBINDEX, TROPONINI in the last 72 hours. PT/INR: No results for input(s): PROTIME, INR in the last 72 hours. APTT: No results for input(s): APTT in the last 72 hours. Liver Profile:        Lab Results   Component Value Date     AST 16 05/26/2018     ALT 8 05/26/2018     BILIDIR 0.3 05/26/2018     BILITOT 1.1 05/26/2018     ALKPHOS 60 05/26/2018            Lab Results   Component Value Date     CHOL 170 08/01/2017     HDL 99 08/01/2017     HDL 52 05/03/2012     TRIG 80 08/01/2017      UA:         Lab Results   Component Value Date     NITRITE Negative 05/26/2015     COLORU Yellow 05/22/2018     PHUR 8.0 05/22/2018     WBCUA 3-5 05/22/2018     RBCUA 0-2 05/22/2018     BACTERIA 1+ 05/22/2018     CLARITYU Clear 05/22/2018     SPECGRAV 1.020 05/22/2018     LEUKOCYTESUR Negative 05/22/2018     UROBILINOGEN 1.0 05/22/2018     BILIRUBINUR Negative 05/22/2018     BILIRUBINUR 1 05/26/2015     BLOODU Negative 05/22/2018     GLUCOSEU Negative 05/22/2018         History obtained: chart, pt     Risk factors: Smoker, alcohol significant history     Diagnosis:  Atypical cell, increased size left upper lobe lung mass     Plan: Discussed in detail and the tumor conference. With the patient. Plan was left video-assisted thoracoscopy lymph node biopsy level VI major wedge to resect the tumor for diagnosis and possible tube.   If patient tolerates procedure  Risk and benefit was discussed with the patient including not limited to bleeding infection prolonged air leak patient was agreeable to proceed     Liam Pope MD FACS

## 2018-08-13 NOTE — PROGRESS NOTES
HYSTERECTOMY, TOTAL ABDOMINAL      LITHOTRIPSY         Level of Consciousness: Alert, Oriented, and Cooperative = 0    Level of Activity: Walking with assistance = 1    Respiratory Pattern: Regular Pattern; RR 8-20 = 0    Breath Sounds: Diminshed bilaterally and/or crackles = 2    Sputum   ,  ,    Cough: Strong, spontaneous, non-productive = 0    Vital Signs   BP (!) 157/81   Pulse 78   Temp 97.8 °F (36.6 °C) (Axillary)   Resp 30   Ht 5' 6\" (1.676 m)   Wt 134 lb 11.2 oz (61.1 kg)   SpO2 100%   BMI 21.74 kg/m²   SPO2 (COPD values may differ): Greater than or equal to 92% on room air = 0    Peak Flow (asthma only): not applicable = 0    RSI: 7-8 = BID and Q4HPRN (every four hours as needed) for dyspnea        Plan       Goals:     Patient/caregiver was educated on the proper method of use for Respiratory Care Devices:  Yes      Level of patient/caregiver understanding able to:   [] Verbalize understanding   [] Demonstrate understanding       [] Teach back        [] Needs reinforcement       []  No available caregiver               []  Other:     Response to education:  Excellent     Is patient being placed on Home Treatment Regimen? No     Does the patient have everything they need prior to discharge? NA     Comments: reviewed pt chart    Plan of Care: continue current plan    Electronically signed by Subhash Alexis RCP on 8/13/2018 at 1:45 PM    Respiratory Protocol Guidelines     1. Assessment and treatment by Respiratory Therapy will be initiated for medication and therapeutic interventions upon initiation of aerosolized medication. 2. Physician will be contacted for respiratory rate (RR) greater than 35 breaths per minute. Therapy will be held for heart rate (HR) greater than 140 beats per minute, pending direction from physician. 3. Bronchodilators will be administered via Metered Dose Inhaler (MDI) with spacer when the following criteria are met:  a.  Alert and cooperative     b. HR < 140 bpm  c. RR <

## 2018-08-13 NOTE — ANESTHESIA POSTPROCEDURE EVALUATION
Department of Anesthesiology  Postprocedure Note    Patient: Alli Form  MRN: [id number]  YOB: 1945  Date of evaluation: 8/13/2018  Time:  3:59 PM     Procedure Summary     Date:  08/13/18 Room / Location:  [address] CVOR    Anesthesia Start:  1000 Anesthesia Stop:  4034    Procedure:  LEFT VIDEO ASSISTED THORACOSCOPIC SURGERY WITH BIOPSY OF AP WINDOW LYMPH NODE, POSSIBLE LEFT UPPER LOBE WEDGE RESECTION          CPT CODES - 36753, 31373 (Left ) Diagnosis:       Lung nodule      (LEFT UPPER LOBE LUNG NODULE)    Surgeon:  Epi Garcia MD Responsible Provider:  Herlinda Heller MD    Anesthesia Type:  general ASA Status:  3          Anesthesia Type: general    Leonard Phase I:      Leonard Phase II:      Last vitals: Reviewed and per EMR flowsheets.        Anesthesia Post Evaluation    Airway patency: patent  Nausea & Vomiting: no nausea  Complications: no  Cardiovascular status: blood pressure returned to baseline  Respiratory status: acceptable  Hydration status: euvolemic

## 2018-08-13 NOTE — PROGRESS NOTES
Scab to right elbow- mepilex dressing placed. Healing scabs to bilateral toes. mepilex heart to coccyx- preventative.

## 2018-08-14 ENCOUNTER — APPOINTMENT (OUTPATIENT)
Dept: GENERAL RADIOLOGY | Age: 73
DRG: 164 | End: 2018-08-14
Attending: THORACIC SURGERY (CARDIOTHORACIC VASCULAR SURGERY)
Payer: MEDICARE

## 2018-08-14 LAB
ALBUMIN SERPL-MCNC: 3.7 G/DL (ref 3.4–5)
ANAEROBIC CULTURE: NORMAL
ANION GAP SERPL CALCULATED.3IONS-SCNC: 13 MMOL/L (ref 3–16)
BUN BLDV-MCNC: 13 MG/DL (ref 7–20)
CALCIUM SERPL-MCNC: 9.4 MG/DL (ref 8.3–10.6)
CHLORIDE BLD-SCNC: 101 MMOL/L (ref 99–110)
CO2: 26 MMOL/L (ref 21–32)
CREAT SERPL-MCNC: 0.8 MG/DL (ref 0.6–1.2)
GFR AFRICAN AMERICAN: >60
GFR NON-AFRICAN AMERICAN: >60
GLUCOSE BLD-MCNC: 147 MG/DL (ref 70–99)
HCT VFR BLD CALC: 34.2 % (ref 36–48)
HEMOGLOBIN: 11.4 G/DL (ref 12–16)
MAGNESIUM: 1.7 MG/DL (ref 1.8–2.4)
MCH RBC QN AUTO: 29.7 PG (ref 26–34)
MCHC RBC AUTO-ENTMCNC: 33.2 G/DL (ref 31–36)
MCV RBC AUTO: 89.4 FL (ref 80–100)
PDW BLD-RTO: 12.4 % (ref 12.4–15.4)
PHOSPHORUS: 3.2 MG/DL (ref 2.5–4.9)
PLATELET # BLD: 297 K/UL (ref 135–450)
PMV BLD AUTO: 9 FL (ref 5–10.5)
POTASSIUM SERPL-SCNC: 3.6 MMOL/L (ref 3.5–5.1)
RBC # BLD: 3.82 M/UL (ref 4–5.2)
SODIUM BLD-SCNC: 140 MMOL/L (ref 136–145)
WBC # BLD: 11.6 K/UL (ref 4–11)
WOUND/ABSCESS: NORMAL

## 2018-08-14 PROCEDURE — 6370000000 HC RX 637 (ALT 250 FOR IP): Performed by: NURSE PRACTITIONER

## 2018-08-14 PROCEDURE — 85027 COMPLETE CBC AUTOMATED: CPT

## 2018-08-14 PROCEDURE — 97535 SELF CARE MNGMENT TRAINING: CPT

## 2018-08-14 PROCEDURE — 71045 X-RAY EXAM CHEST 1 VIEW: CPT

## 2018-08-14 PROCEDURE — 97162 PT EVAL MOD COMPLEX 30 MIN: CPT

## 2018-08-14 PROCEDURE — G8979 MOBILITY GOAL STATUS: HCPCS

## 2018-08-14 PROCEDURE — 97166 OT EVAL MOD COMPLEX 45 MIN: CPT

## 2018-08-14 PROCEDURE — G8988 SELF CARE GOAL STATUS: HCPCS

## 2018-08-14 PROCEDURE — 97116 GAIT TRAINING THERAPY: CPT

## 2018-08-14 PROCEDURE — 94150 VITAL CAPACITY TEST: CPT

## 2018-08-14 PROCEDURE — 80069 RENAL FUNCTION PANEL: CPT

## 2018-08-14 PROCEDURE — 2500000003 HC RX 250 WO HCPCS: Performed by: NURSE PRACTITIONER

## 2018-08-14 PROCEDURE — 94664 DEMO&/EVAL PT USE INHALER: CPT

## 2018-08-14 PROCEDURE — 2580000003 HC RX 258: Performed by: NURSE PRACTITIONER

## 2018-08-14 PROCEDURE — 94668 MNPJ CHEST WALL SBSQ: CPT

## 2018-08-14 PROCEDURE — 83735 ASSAY OF MAGNESIUM: CPT

## 2018-08-14 PROCEDURE — G8978 MOBILITY CURRENT STATUS: HCPCS

## 2018-08-14 PROCEDURE — 6360000002 HC RX W HCPCS: Performed by: THORACIC SURGERY (CARDIOTHORACIC VASCULAR SURGERY)

## 2018-08-14 PROCEDURE — 2700000000 HC OXYGEN THERAPY PER DAY

## 2018-08-14 PROCEDURE — 36415 COLL VENOUS BLD VENIPUNCTURE: CPT

## 2018-08-14 PROCEDURE — G8987 SELF CARE CURRENT STATUS: HCPCS

## 2018-08-14 PROCEDURE — 2580000003 HC RX 258: Performed by: THORACIC SURGERY (CARDIOTHORACIC VASCULAR SURGERY)

## 2018-08-14 PROCEDURE — 2000000000 HC ICU R&B

## 2018-08-14 PROCEDURE — 6370000000 HC RX 637 (ALT 250 FOR IP): Performed by: THORACIC SURGERY (CARDIOTHORACIC VASCULAR SURGERY)

## 2018-08-14 PROCEDURE — 6360000002 HC RX W HCPCS: Performed by: NURSE PRACTITIONER

## 2018-08-14 PROCEDURE — 94761 N-INVAS EAR/PLS OXIMETRY MLT: CPT

## 2018-08-14 PROCEDURE — 94640 AIRWAY INHALATION TREATMENT: CPT

## 2018-08-14 RX ORDER — FAMOTIDINE 20 MG/1
20 TABLET, FILM COATED ORAL DAILY
Status: COMPLETED | OUTPATIENT
Start: 2018-08-14 | End: 2018-08-15

## 2018-08-14 RX ORDER — THIAMINE MONONITRATE (VIT B1) 100 MG
100 TABLET ORAL DAILY
Status: DISCONTINUED | OUTPATIENT
Start: 2018-08-14 | End: 2018-08-14

## 2018-08-14 RX ORDER — NICOTINE 21 MG/24HR
1 PATCH, TRANSDERMAL 24 HOURS TRANSDERMAL DAILY
Status: DISCONTINUED | OUTPATIENT
Start: 2018-08-14 | End: 2018-08-15 | Stop reason: HOSPADM

## 2018-08-14 RX ORDER — FOLIC ACID 1 MG/1
1 TABLET ORAL DAILY
Status: DISCONTINUED | OUTPATIENT
Start: 2018-08-14 | End: 2018-08-14

## 2018-08-14 RX ORDER — MAGNESIUM SULFATE IN WATER 40 MG/ML
2 INJECTION, SOLUTION INTRAVENOUS ONCE
Status: COMPLETED | OUTPATIENT
Start: 2018-08-14 | End: 2018-08-14

## 2018-08-14 RX ORDER — M-VIT,TX,IRON,MINS/CALC/FOLIC 27MG-0.4MG
1 TABLET ORAL DAILY
Status: DISCONTINUED | OUTPATIENT
Start: 2018-08-17 | End: 2018-08-15 | Stop reason: HOSPADM

## 2018-08-14 RX ADMIN — DOCUSATE SODIUM 100 MG: 100 CAPSULE, LIQUID FILLED ORAL at 20:46

## 2018-08-14 RX ADMIN — GABAPENTIN 300 MG: 300 CAPSULE ORAL at 20:45

## 2018-08-14 RX ADMIN — MAGNESIUM SULFATE HEPTAHYDRATE 2 G: 40 INJECTION, SOLUTION INTRAVENOUS at 08:12

## 2018-08-14 RX ADMIN — HYDRALAZINE HYDROCHLORIDE 5 MG: 20 INJECTION INTRAMUSCULAR; INTRAVENOUS at 03:58

## 2018-08-14 RX ADMIN — SODIUM CHLORIDE, PRESERVATIVE FREE 10 ML: 5 INJECTION INTRAVENOUS at 20:46

## 2018-08-14 RX ADMIN — CITALOPRAM HYDROBROMIDE 20 MG: 20 TABLET ORAL at 08:02

## 2018-08-14 RX ADMIN — OXYBUTYNIN CHLORIDE 5 MG: 5 TABLET ORAL at 08:02

## 2018-08-14 RX ADMIN — TRAMADOL HYDROCHLORIDE 50 MG: 50 TABLET, FILM COATED ORAL at 08:02

## 2018-08-14 RX ADMIN — FAMOTIDINE 20 MG: 20 TABLET ORAL at 08:12

## 2018-08-14 RX ADMIN — ALBUTEROL SULFATE 2.5 MG: 2.5 SOLUTION RESPIRATORY (INHALATION) at 09:04

## 2018-08-14 RX ADMIN — LOSARTAN POTASSIUM 50 MG: 25 TABLET, FILM COATED ORAL at 08:02

## 2018-08-14 RX ADMIN — FOLIC ACID: 5 INJECTION, SOLUTION INTRAMUSCULAR; INTRAVENOUS; SUBCUTANEOUS at 10:59

## 2018-08-14 RX ADMIN — SODIUM CHLORIDE, PRESERVATIVE FREE 10 ML: 5 INJECTION INTRAVENOUS at 08:02

## 2018-08-14 RX ADMIN — Medication 1 TABLET: at 08:02

## 2018-08-14 RX ADMIN — ALBUTEROL SULFATE 2.5 MG: 2.5 SOLUTION RESPIRATORY (INHALATION) at 12:18

## 2018-08-14 RX ADMIN — OXYBUTYNIN CHLORIDE 5 MG: 5 TABLET ORAL at 20:46

## 2018-08-14 RX ADMIN — HYDROMORPHONE HYDROCHLORIDE 0.5 MG: 2 INJECTION, SOLUTION INTRAMUSCULAR; INTRAVENOUS; SUBCUTANEOUS at 16:44

## 2018-08-14 RX ADMIN — DOCUSATE SODIUM 100 MG: 100 CAPSULE, LIQUID FILLED ORAL at 08:02

## 2018-08-14 RX ADMIN — ALBUTEROL SULFATE 2.5 MG: 2.5 SOLUTION RESPIRATORY (INHALATION) at 19:28

## 2018-08-14 RX ADMIN — ATORVASTATIN CALCIUM 20 MG: 10 TABLET, FILM COATED ORAL at 08:02

## 2018-08-14 RX ADMIN — MUPIROCIN: 20 OINTMENT TOPICAL at 08:03

## 2018-08-14 RX ADMIN — ASPIRIN 81 MG: 81 TABLET ORAL at 08:02

## 2018-08-14 RX ADMIN — CYANOCOBALAMIN TAB 500 MCG 1000 MCG: 500 TAB at 08:02

## 2018-08-14 RX ADMIN — ENOXAPARIN SODIUM 40 MG: 40 INJECTION SUBCUTANEOUS at 08:02

## 2018-08-14 ASSESSMENT — PAIN SCALES - GENERAL
PAINLEVEL_OUTOF10: 0
PAINLEVEL_OUTOF10: 8
PAINLEVEL_OUTOF10: 10

## 2018-08-14 NOTE — PROGRESS NOTES
Physical Therapy    Facility/Department: [facility] C2 CARD TELEMETRY  Initial Assessment    NAME: Reji Grady  : 1945  MRN: [id number]    Date of Service: 2018    Discharge Recommendations:  Home with Home health PT, 24 hour supervision or assist, S Level 3   PT Equipment Recommendations  Equipment Needed: No (pt owns RW)    Patient Diagnosis(es): The encounter diagnosis was Lung nodule. has a past medical history of Alcohol abuse; Asthma; COPD, severe ([address] Coronary artery disease involving native coronary artery of native heart without angina pectoris; Essential hypertension; GERD (gastroesophageal reflux disease); Hyperlipidemia; Hypertension; Injury of esophagus; Insomnia; Kidney stone; Malignant neoplasm of bronchus of left upper lobe ([address] Mass of upper lobe of left lung; Neuropathy; Osteoarthritis; Prolonged emergence from general anesthesia; and Urinary incontinence. has a past surgical history that includes Lithotripsy; Colonoscopy (2013); Hysterectomy, total abdominal; bronchoscopy (2017); Colonoscopy (2010); Thoracoscopy (Left, 2018); and pr office/outpt visit,procedure only (Left, 2018).     Restrictions  Restrictions/Precautions  Restrictions/Precautions: General Precautions, Fall Risk  Position Activity Restriction  Other position/activity restrictions: up as tolerated; ambulate; 1 chest tube  Vision/Hearing  Vision: Impaired  Vision Exceptions: Wears glasses at all times  Hearing: Within functional limits     Subjective  General  Chart Reviewed: Yes  Patient assessed for rehabilitation services?: Yes  Response To Previous Treatment: Not applicable  Family / Caregiver Present: No  Referring Practitioner: APRIL Ashford  Referral Date : 18  Diagnosis: Malignant neoplasm left upper lobe; s/p 18 LEFT VIDEO ASSISTED THORACOSCOPIC SURGERY WITH BIOPSY   Follows Commands: Within Functional Limits  General Comment  Comments: Pt resting in bed on approach; RN cleared pt for therapy  Subjective  Subjective: pt agreeable to therapy and wanting to get up to chair for lunch  Pain Screening  Patient Currently in Pain: Denies       Orientation  Orientation  Overall Orientation Status: Within Functional Limits    Social/Functional History  Social/Functional History  Lives With: Other (comment) (roommate at home 24 hr)  Type of Home: House  Home Layout: One level  Home Access: Stairs to enter without rails (1 mary kay)  Entrance Stairs - Number of Steps: 1  Bathroom Shower/Tub: Walk-in shower, Shower chair without back  Bathroom Toilet: Handicap height  Bathroom Equipment: Grab bars in shower, Grab bars around toilet  Bathroom Accessibility: Mccoy Shayne: Rolling walker, Hallie, [address] chair  Receives Help From: Friend(s) (roommate)  ADL Assistance: Independent  Homemaking Assistance: Needs assistance (from roommate)  Homemaking Responsibilities: Yes (shared with roommate)  Ambulation Assistance: Independent (RW)  Active : No  Objective       RLE AROM: WFL  LLE AROM : WFL  Strength RLE: WFL  Strength LLE: WFL        Bed mobility  Supine to Sit: Contact guard assistance (HOB elevated; cues for sequencing)  Sit to Supine: Unable to assess (pt up in chair at end of session)     Transfers  Sit to Stand: Contact guard assistance  Stand to sit: Contact guard assistance     Ambulation  Ambulation?: Yes  Ambulation 1  Surface: level tile  Device: Rolling Walker  Assistance: Contact guard assistance  Quality of Gait: Demonstrates shuffling gait, decreased step length bilaterally. Cues to maintain KRISTINE within RW. Distance: 25 ft + 15 ft  Comments: Gait training limited due to lunch     Balance  Sitting - Static: Good  Sitting - Dynamic: Good  Standing - Static: Good;-  Standing - Dynamic: Fair;+        Assessment   Body structures, Functions, Activity limitations: Decreased functional mobility ; Decreased endurance;Decreased balance  Assessment:

## 2018-08-14 NOTE — PROGRESS NOTES
08/13/18 2115   Oxygen Therapy/Pulse Ox   O2 Device Nasal cannula   O2 Flow Rate (L/min) 3 L/min   SpO2 94 %   Treatment   Treatment Type HHN;IS;Vibratory Mucous Clearing Therapy or Intervention   Medications Albuterol   Duration 10   Pre-Tx Pulse 72   Pre-Tx Resps 16   Breath Sounds Pre-Tx LATANYA Diminished   Breath Sounds Pre-Tx LLL Diminished   Breath Sounds Pre-Tx RUL Diminished   Breath Sounds Pre-Tx RML Diminished   Breath Sounds Pre-Tx RLL Diminished   Breath Sounds Post-Tx LATANYA Diminished   Breath Sounds Post-Tx LLL Diminished   Breath Sounds Post-Tx RUL Diminished   Breath Sounds Post-Tx RML Diminished   Breath Sounds Post-Tx RLL Diminished   Post-Tx Pulse 72   Post-Tx Resps 16   Delivery Source Oxygen   Position Peralta's   Tx Tolerance Well   Incentive Spirometry Tx   Treatment Tolerance Other (Comment)  (unable tp perform at this time)   Cough/Sputum   Sputum How Obtained Cough on request   Cough Non-productive   Patient Observation   Observations not able tp perform acapella at this time

## 2018-08-14 NOTE — PROGRESS NOTES
Patient had calvin hose placed   Chest tube drainage was leaking  this am dressing changed. Intermittent Air leak noted after ambulation to scale and chair this am. Patient ambulated without incident.  Patient able to follow simple commands and do simple tasks

## 2018-08-15 ENCOUNTER — APPOINTMENT (OUTPATIENT)
Dept: GENERAL RADIOLOGY | Age: 73
DRG: 164 | End: 2018-08-15
Attending: THORACIC SURGERY (CARDIOTHORACIC VASCULAR SURGERY)
Payer: MEDICARE

## 2018-08-15 VITALS
BODY MASS INDEX: 22.04 KG/M2 | SYSTOLIC BLOOD PRESSURE: 115 MMHG | TEMPERATURE: 98.6 F | DIASTOLIC BLOOD PRESSURE: 53 MMHG | HEIGHT: 66 IN | OXYGEN SATURATION: 98 % | HEART RATE: 75 BPM | WEIGHT: 137.13 LBS | RESPIRATION RATE: 18 BRPM

## 2018-08-15 PROBLEM — R91.1 LUNG NODULE: Status: ACTIVE | Noted: 2017-08-01

## 2018-08-15 LAB
ALBUMIN SERPL-MCNC: 3.3 G/DL (ref 3.4–5)
ANION GAP SERPL CALCULATED.3IONS-SCNC: 11 MMOL/L (ref 3–16)
BUN BLDV-MCNC: 13 MG/DL (ref 7–20)
CALCIUM SERPL-MCNC: 9.2 MG/DL (ref 8.3–10.6)
CHLORIDE BLD-SCNC: 100 MMOL/L (ref 99–110)
CO2: 25 MMOL/L (ref 21–32)
CREAT SERPL-MCNC: 0.7 MG/DL (ref 0.6–1.2)
GFR AFRICAN AMERICAN: >60
GFR NON-AFRICAN AMERICAN: >60
GLUCOSE BLD-MCNC: 108 MG/DL (ref 70–99)
HCT VFR BLD CALC: 34.8 % (ref 36–48)
HEMOGLOBIN: 11.8 G/DL (ref 12–16)
MAGNESIUM: 2 MG/DL (ref 1.8–2.4)
MCH RBC QN AUTO: 30.2 PG (ref 26–34)
MCHC RBC AUTO-ENTMCNC: 33.9 G/DL (ref 31–36)
MCV RBC AUTO: 89.1 FL (ref 80–100)
PDW BLD-RTO: 12.4 % (ref 12.4–15.4)
PHOSPHORUS: 3.1 MG/DL (ref 2.5–4.9)
PLATELET # BLD: 305 K/UL (ref 135–450)
PMV BLD AUTO: 8.2 FL (ref 5–10.5)
POTASSIUM SERPL-SCNC: 4.1 MMOL/L (ref 3.5–5.1)
RBC # BLD: 3.91 M/UL (ref 4–5.2)
SODIUM BLD-SCNC: 136 MMOL/L (ref 136–145)
WBC # BLD: 11.7 K/UL (ref 4–11)

## 2018-08-15 PROCEDURE — 83735 ASSAY OF MAGNESIUM: CPT

## 2018-08-15 PROCEDURE — 36415 COLL VENOUS BLD VENIPUNCTURE: CPT

## 2018-08-15 PROCEDURE — 71045 X-RAY EXAM CHEST 1 VIEW: CPT

## 2018-08-15 PROCEDURE — 94668 MNPJ CHEST WALL SBSQ: CPT

## 2018-08-15 PROCEDURE — 97116 GAIT TRAINING THERAPY: CPT

## 2018-08-15 PROCEDURE — 99024 POSTOP FOLLOW-UP VISIT: CPT | Performed by: THORACIC SURGERY (CARDIOTHORACIC VASCULAR SURGERY)

## 2018-08-15 PROCEDURE — 2580000003 HC RX 258: Performed by: NURSE PRACTITIONER

## 2018-08-15 PROCEDURE — 85027 COMPLETE CBC AUTOMATED: CPT

## 2018-08-15 PROCEDURE — 80069 RENAL FUNCTION PANEL: CPT

## 2018-08-15 PROCEDURE — 2500000003 HC RX 250 WO HCPCS: Performed by: NURSE PRACTITIONER

## 2018-08-15 PROCEDURE — 6370000000 HC RX 637 (ALT 250 FOR IP): Performed by: NURSE PRACTITIONER

## 2018-08-15 PROCEDURE — 94150 VITAL CAPACITY TEST: CPT

## 2018-08-15 PROCEDURE — 94640 AIRWAY INHALATION TREATMENT: CPT

## 2018-08-15 PROCEDURE — 6360000002 HC RX W HCPCS: Performed by: NURSE PRACTITIONER

## 2018-08-15 PROCEDURE — 6370000000 HC RX 637 (ALT 250 FOR IP): Performed by: THORACIC SURGERY (CARDIOTHORACIC VASCULAR SURGERY)

## 2018-08-15 PROCEDURE — 94664 DEMO&/EVAL PT USE INHALER: CPT

## 2018-08-15 PROCEDURE — 97110 THERAPEUTIC EXERCISES: CPT

## 2018-08-15 PROCEDURE — 2580000003 HC RX 258: Performed by: THORACIC SURGERY (CARDIOTHORACIC VASCULAR SURGERY)

## 2018-08-15 PROCEDURE — 6360000002 HC RX W HCPCS: Performed by: THORACIC SURGERY (CARDIOTHORACIC VASCULAR SURGERY)

## 2018-08-15 RX ORDER — TRAMADOL HYDROCHLORIDE 50 MG/1
25 TABLET ORAL EVERY 6 HOURS PRN
Qty: 14 TABLET | Refills: 0 | Status: SHIPPED | OUTPATIENT
Start: 2018-08-15 | End: 2018-08-22

## 2018-08-15 RX ORDER — PSEUDOEPHEDRINE HCL 30 MG
100 TABLET ORAL 2 TIMES DAILY
COMMUNITY
Start: 2018-08-15 | End: 2019-01-01 | Stop reason: ALTCHOICE

## 2018-08-15 RX ADMIN — SODIUM CHLORIDE, PRESERVATIVE FREE 10 ML: 5 INJECTION INTRAVENOUS at 08:11

## 2018-08-15 RX ADMIN — OXYBUTYNIN CHLORIDE 5 MG: 5 TABLET ORAL at 08:12

## 2018-08-15 RX ADMIN — MUPIROCIN: 20 OINTMENT TOPICAL at 08:16

## 2018-08-15 RX ADMIN — DOCUSATE SODIUM 100 MG: 100 CAPSULE, LIQUID FILLED ORAL at 08:12

## 2018-08-15 RX ADMIN — FAMOTIDINE 20 MG: 20 TABLET ORAL at 08:12

## 2018-08-15 RX ADMIN — LOSARTAN POTASSIUM 50 MG: 25 TABLET, FILM COATED ORAL at 08:11

## 2018-08-15 RX ADMIN — CITALOPRAM HYDROBROMIDE 20 MG: 20 TABLET ORAL at 08:12

## 2018-08-15 RX ADMIN — ATORVASTATIN CALCIUM 20 MG: 10 TABLET, FILM COATED ORAL at 08:12

## 2018-08-15 RX ADMIN — ALBUTEROL SULFATE 2.5 MG: 2.5 SOLUTION RESPIRATORY (INHALATION) at 15:24

## 2018-08-15 RX ADMIN — FOLIC ACID: 5 INJECTION, SOLUTION INTRAMUSCULAR; INTRAVENOUS; SUBCUTANEOUS at 09:00

## 2018-08-15 RX ADMIN — ALBUTEROL SULFATE 2.5 MG: 2.5 SOLUTION RESPIRATORY (INHALATION) at 12:21

## 2018-08-15 RX ADMIN — TRAMADOL HYDROCHLORIDE 50 MG: 50 TABLET, FILM COATED ORAL at 08:12

## 2018-08-15 RX ADMIN — ENOXAPARIN SODIUM 40 MG: 40 INJECTION SUBCUTANEOUS at 08:11

## 2018-08-15 RX ADMIN — ASPIRIN 81 MG: 81 TABLET ORAL at 08:12

## 2018-08-15 RX ADMIN — CYANOCOBALAMIN TAB 500 MCG 1000 MCG: 500 TAB at 08:12

## 2018-08-15 ASSESSMENT — PAIN SCALES - GENERAL
PAINLEVEL_OUTOF10: 8
PAINLEVEL_OUTOF10: 0
PAINLEVEL_OUTOF10: 0

## 2018-08-15 NOTE — DISCHARGE SUMMARY
Cardiac, Vascular & Thoracic Surgery  Discharge Summary    Patient:  Shane Kaye 1945 [id number]   Admission Date:  8/13/2018  8:15 AM  Discharge Date:  8/15/18    Principle Diagnosis: Left Upper Lung Nodule    Secondary Diagnosis:  Active Problems:    Malignant neoplasm of bronchus of left upper lobe (HCC)    Acute postoperative pain    S/P thoracotomy  Resolved Problems:    * No resolved hospital problems. *    Procedure: 8/13/18 LEFT VIDEO ASSISTED THORACOSCOPIC SURGERY WITH BIOPSY OF AP WINDOW LYMPH NODE, POSSIBLE LEFT UPPER LOBE WEDGE RESECTION              History:  The patient is a 67 y.o. female    Hospital Course: The patient had an uneventful postoperative period and was discharged home in stable condition. Discharged Condition: stable    Disposition:  home    Discharge Medications:   Casi Bhagat [address] Medication Instructions VRV:[id number]    Printed on:08/15/18 1908   Medication Information                      albuterol sulfate HFA (VENTOLIN HFA) 108 (90 Base) MCG/ACT inhaler  Inhale 2 puffs into the lungs every 6 hours as needed for Wheezing             aspirin 81 MG tablet  Take 81 mg by mouth daily             atorvastatin (LIPITOR) 20 MG tablet  TAKE ONE TABLET BY MOUTH DAILY             Cholecalciferol (VITAMIN D3) 2000 UNITS CAPS  Take  by mouth.             citalopram (CELEXA) 20 MG tablet  Take 1 tablet by mouth daily             docusate sodium (COLACE, DULCOLAX) 100 MG CAPS  Take 100 mg by mouth 2 times daily             gabapentin (NEURONTIN) 300 MG capsule  Take 300 mg by mouth every evening. Elle Laureano losartan (COZAAR) 50 MG tablet  Take 1 tablet by mouth as needed             oxybutynin (DITROPAN) 5 MG tablet  Take 1 tablet by mouth 2 times daily             therapeutic multivitamin-minerals (THERAGRAN-M) tablet  Take 1 tablet by mouth daily.                traMADol (ULTRAM) 50 MG tablet  Take 0.5 tablets by mouth every 6 hours as needed for Pain for up to 7 days..             vitamin B-12 (CYANOCOBALAMIN) 1000 MCG tablet  Take 1,000 mcg by mouth daily. Patient Instructions: Activity: DO NOT LIFT, PUSH, OR PULL ANYTHING OVER 5 POUNDS FOR 6 WEEK from the day of surgery  Diet:  regular diet  Wound Care:  [address] YOUR INCISIONS DAILY WITH A CLEAN WASHCLOTH AND ANTIBACTERIAL SOAP. Do not wash your incisions after you have cleansed other parts of your body    Follow up with Cardiothoracic Surgeon, Dr. Luis Miguel Machuca in 2 weeks    Burgess Vargas PhD, APRN-CNP  Agree with note.       Priscila Briggs MD, FACS, [facility], FACCP, [place]

## 2018-08-15 NOTE — PROGRESS NOTES
Occupational Therapy  Attempted to see pt for follow-up, with pt receiving breakfast upon therapist arrival. Will re-attempt as schedule permits.      Andre Adkins, OTR/L

## 2018-08-15 NOTE — PROGRESS NOTES
events of last 24 hours reviewed along with vital signs and I/Os and patient examined. Assessment and plans discussed and are as outlined above.      Saira Manuel MD, FACS, [facility], FACCP, Jessica

## 2018-08-15 NOTE — CARE COORDINATION
[address] Transitions Interview     8/15/2018    Patient: Tesfaye Joyce Patient : 1945   MRN: [id number]  Reason for Admission: Left Upper Lobe Lung Nodule   RARS: Readmission Risk Score: 22       1030: Attempted to see patient and PT present in room. 1040: Attempted again to see patient and nursing staff in room rendering care to patient. Will attempt to see patient at later time. Per chart review and discussion with DCP patient plans to return home with [facility].        Readmission Risk  Patient Active Problem List   Diagnosis    Hyperlipidemia, familial, high LDL    Primary osteoarthritis of both knees    Primary insomnia    Mixed incontinence    Gastroesophageal reflux disease without esophagitis    Left hip pain    Seasonal allergies    COPD, severe ([address]    Adjustment reaction with anxiety and depression    Coronary artery disease involving native coronary artery of native heart without angina pectoris    Multiple pulmonary nodules    Mediastinal lymphadenopathy    Clavicle fracture    Alcohol withdrawal syndrome with complication ([address]    Centrilobular emphysema (HCC)    Encephalopathy, metabolic    Moderate malnutrition ([address]    Mass of upper lobe of left lung    Malignant neoplasm of bronchus of left upper lobe [facility])       Inpatient Assessment  Care Transitions Summary    Care Transitions Inpatient Review  Medication Review  Housing Review  Social Support  Durable Medical Equipment  Functional Review  Hearing and Vision  Care Transitions Interventions         Follow Up  Future Appointments  Date Time Provider Agueda Kiser   2019 9:30 AM Kiley Combs MD [facility]       Health Maintenance  Health Maintenance Due   Topic Date Due    A1C test (Diabetic or Prediabetic)  2017       Deshawn Riggins RN

## 2018-08-15 NOTE — PROGRESS NOTES
Chest tubes meet criteria to remove per open heart protocol. Order received to remove. No air leak. No crepitus. Pt instructed on procedure. Pt premedicated with pain medication per physician order. Dressings removed. Incision within normal limits. Site cleansed and prepped per protocol. Chest tubes X 1 removed without difficulty. Vaseline gauze and dry sterile dressing applied. Bilateral breath sounds audible. O2Sats 94% on room air. Patient tolerated well.   Electronically signed by Tara Carter RN on 8/15/2018 at 2:20 PM

## 2018-08-15 NOTE — PROGRESS NOTES
4 Eyes Skin Assessment     The patient is being assess for   Shift Handoff    I agree that 2 RN's have performed a thorough Head to Toe Skin Assessment on the patient. ALL assessment sites listed below have been assessed. Areas assessed by both nurses:   [x]   Head, Face, and Ears   [x]   Shoulders, Back, and Chest, Abdomen  [x]   Arms, Elbows, and Hands   [x]   Coccyx, Sacrum, and Ischium  [x]   Legs, Feet, and Heels            **SHARE this note so that the co-signing nurse is able to place an eSignature**    Co-signer eSignature: Electronically signed by Charisma Thomas RN on 8/15/18 at 2:21 PM    Does the Patient have Skin Breakdown?   No          Scott Prevention initiated:  Yes   Wound Care Orders initiated:  no      [facility] nurse consulted for Pressure Injury (Stage 3,4, Unstageable, DTI, NWPT, Complex wounds)and New or Established Ostomies:  no     Primary Nurse eSignature: Electronically signed by Mumtaz Vela RN on 8/15/18 at 6:40 AM

## 2018-08-15 NOTE — PROGRESS NOTES
Follow up appointment made with Dr Cindy Harvey office for Aug 22 at 2:30 pm. Discharge instructions with medication details complete. Pt discharged with significant other to private vehicle. Denies needs at this time.  Electronically signed by Hannah Closs, RN on 8/15/2018 at 4:05 PM

## 2018-08-15 NOTE — PROGRESS NOTES
Physical Therapy  Facility/Department: [facility] C2 CARD TELEMETRY  Daily Treatment Note and Discharge Summary  NAME: Lei Ye  :   MRN: [id number]    Date of Service: 8/15/2018    Discharge Recommendations:  Home with Home health PT, 24 hour supervision or assist, S Level 3   PT Equipment Recommendations  Equipment Needed: No (pt owns RW)    Patient Diagnosis(es): The encounter diagnosis was Lung nodule. has a past medical history of Alcohol abuse; Asthma; COPD, severe ([address] Coronary artery disease involving native coronary artery of native heart without angina pectoris; Essential hypertension; GERD (gastroesophageal reflux disease); Hyperlipidemia; Hypertension; Injury of esophagus; Insomnia; Kidney stone; Malignant neoplasm of bronchus of left upper lobe ([address] Mass of upper lobe of left lung; Neuropathy; Osteoarthritis; Prolonged emergence from general anesthesia; and Urinary incontinence. has a past surgical history that includes Lithotripsy; Colonoscopy (2013); Hysterectomy, total abdominal; bronchoscopy (2017); Colonoscopy (2010); Thoracoscopy (Left, 2018); and pr office/outpt visit,procedure only (Left, 2018). Restrictions  Restrictions/Precautions  Restrictions/Precautions: General Precautions, Fall Risk  Position Activity Restriction  Other position/activity restrictions: up as tolerated; ambulate; 1 chest tube  Subjective   General  Chart Reviewed: Yes  Response To Previous Treatment: Patient with no complaints from previous session.   Family / Caregiver Present: No  Referring Practitioner: APRIL Peres  Subjective  Subjective: Pt agreeable to therapy  General Comment  Comments: Pt up in chair on approach; RN cleared pt for therapy  Pain Screening  Patient Currently in Pain: Denies       Objective   Bed mobility  Sit to Supine: Independent     Transfers  Sit to Stand: Modified independent  Stand to sit: Modified independent Ambulation  Ambulation?: Yes  Ambulation 1  Surface: level tile  Device: Rolling Walker  Assistance: Supervision  Quality of Gait: Cues for posture; shuffling gait that appears to be baseline. No LOB or unsteadiness  Distance: 120 ft    Stairs  # Steps : 1  Stairs Height: 6\"  Rails: Bilateral  Assistance: Supervision  Comment: Cues for technique and use of hand rails        Exercises  Quad Sets: x 10 BLE  Heelslides: x 10 BLE  Gluteal Sets: x 10 BLE  Hip Abduction: x 10 BLE  Ankle Pumps: x 10 BLE  Comments: Pt given handout of supine HEP. Instucted to perform 10-15 reps, 2x/day          Assessment   Body structures, Functions, Activity limitations: Decreased functional mobility ; Decreased endurance;Decreased balance  Assessment: Pt has progressed well with therapy. Pt is mod I with transfers and supervision for gait and stair negotiation. Will d/c from PT due to all acute care goals met. Treatment Diagnosis: decreased (I) with mobility  Prognosis: Good  Patient Education: gait, HEP, stairs, transfers, bed mobility  Barriers to Learning: none  REQUIRES PT FOLLOW UP: Yes  Activity Tolerance  Activity Tolerance: Patient Tolerated treatment well  Activity Tolerance: Vitals stable                            Goals  Short term goals  Time Frame for Short term goals: 1 week  Short term goal 1: Pt will be indep with bed mobility. - GOAL MET 8/15/18  Short term goal 2: Pt will be mod I with transfers with RW. - GOAL MET 8/15/18  Short term goal 3: Pt will be supervision for ambulation 100 ft with RW. - GOAL MET 8/15/18  Short term goal 4: Pt will negotiate 1 stair with supervision. - GOAL MET 8/15/18  Short term goal 5: 8/16/18: pt will participate in 12-15 reps of BLE exercises to promote activity tolerance and normalize gait. - GOAL MET 8/15/18  Patient Goals   Patient goals :  \"To go home\"    Plan    Plan  Times per week: d/c acute PT  Times per day: Daily  Specific instructions for Next Treatment: progress mobility as tolerated  Current Treatment Recommendations: Strengthening, Neuromuscular Re-education, Home Exercise Program, ROM, Safety Education & Training, Balance Training, Endurance Training, Functional Mobility Training, Transfer Training, Gait Training, Stair training, Patient/Caregiver Education & Training  Safety Devices  Type of devices:  All fall risk precautions in place, Bed alarm in place, Gait belt, Patient at risk for falls, Call light within reach, Left in bed, Nurse notified     Therapy Time   Individual Concurrent Group Co-treatment   Time In 1014         Time Out 1038         Minutes 24         Timed Code Treatment Minutes: 2804 Krystyna Patel, [place]

## 2018-08-15 NOTE — CARE COORDINATION
CASE MANAGEMENT DISCHARGE SUMMARY      Discharge to: home    New Durable Medical Equipment ordered/agency: none    Transportation: private   Family/car: significant other to transport home. Notified: Patient aware of discharge plans and in agreement. Family: at bedside and aware    RN: Chinyere Bellamy RN aware of discharge plans. Per Chinyere Bellamy therapy discharged her from their service and patient is no longer requiring therefore does not need HHC.       Jesse Cross RN

## 2018-08-16 ENCOUNTER — CARE COORDINATION (OUTPATIENT)
Dept: CASE MANAGEMENT | Age: 73
End: 2018-08-16

## 2018-08-16 NOTE — CARE COORDINATION
[facility] Transitions Initial Follow Up Call    Call within 2 business days of discharge: Yes    Patient: Yves Belle Patient : 1945   MRN: [id number]  Reason for Admission: s/p Thoracotomy d/t Malignant Left Upper Lobe Lung Nodule  Discharge Date: 8/15/18 RARS: Readmission Risk Score: 22     Spoke with: significant other 54 [address] Deepti: Silvano Garcia    Non-face-to-face services provided:  Obtained and reviewed discharge summary and/or continuity of care documents  Education of patient/family/caregiver/guardian to support self-management-post op education    Care Transitions 24 Hour Call    Do you have a copy of your discharge instructions?:  Yes  Do you have all of your prescriptions and are they filled?:  Yes  Have you been contacted by a [address]?:  No  Have you scheduled your follow up appointment?:  Yes  Were you discharged with any Home Care or Post Acute Services:  No  Care Transitions Interventions     Spoke with s/o Renea Mehta as patient was napping. States she's doing pretty good, said she did have significant pain this morning so he went and picked up the prescription for pain med (Tramadol) which helped. Said she's eating good. Only other new medication was docusate, no other changes. Reviewed the following with him:  Chest Tube Insertion Site:  Leave dressing on until 18 @ 2pm. Erika Bal may remove it at that time and clean the site daily with antibacterial soap. You may take a shower after the dressing is removed. Surgical Procedure Site:  You may leave your old incision site open to air after discharge home. Clean the site daily with antibacterial soap. All Surgical/Chest Tube Sites:  -Once you remove your old chest tube dressing, you may apply a band-aid as needed for any spotting and/or drainage.  A dressing is not needed and please do not use creams, lotions, or Neosporin (antibiotic cream) on or around surgical and old chest tube sites.   -Do not soak in a bath tub and do not let water beat on your incisions during a shower.   -Do not lift anything greater than 10 pounds for 2 weeks while incision heals. Special Instructions:  -Continue to use your incentive spirometer every 1-2 hours while you are awake at home to keep your lungs inflated and to prevent the development of pneumonia. -Continue to use your incentive spirometer until your next appointment with Dr. Ashly Nogueira and you are instructed to do otherwise by your surgeon.  -Call Dr. Vanesa Borges office if any of your incisions appear red, if they are draining any fluid that appears like pus, if you are running a high grade fever (greater than 101.5 degrees), or if you have any questions about your surgery or incisions at any time. He verbalized understanding. Denies any needs at present time. Agreeable to f/u calls. Reminded him that if they have any questions/concerns at anytime, they can always utilize CTC or call PCP/specialist as they have an MD on call 24/7.       Follow Up  Future Appointments  Date Time Provider Agueda Kiser   8/22/2018 2:30 PM [address], MD [facility] [address]   1/7/2019 9:30 AM Kathy Sotelo MD [facility] MELLO Holliday RN

## 2018-08-18 NOTE — OP NOTE
for the  posterior segment was performed for the left upper lobe. Specimens were  removed in a bag and sent for pathology and confirmed the presence of the  lesion. Copious amount of irrigation was done. No active bleeding was  seen. Multiple intercostal nerve blocks were inserted and the patient was  dispositioned to the recovery room in stable condition with no  complication.         Fide Arriaga MD    D: 08/17/2018 8:31:49       T: 08/17/2018 14:39:26     [id number]  Job#: [id number]     Doc#: [id number]    CC:

## 2018-08-22 ENCOUNTER — OFFICE VISIT (OUTPATIENT)
Dept: CARDIOTHORACIC SURGERY | Age: 73
End: 2018-08-22

## 2018-08-22 ENCOUNTER — CARE COORDINATION (OUTPATIENT)
Dept: CASE MANAGEMENT | Age: 73
End: 2018-08-22

## 2018-08-22 VITALS
OXYGEN SATURATION: 95 % | SYSTOLIC BLOOD PRESSURE: 90 MMHG | TEMPERATURE: 96.9 F | DIASTOLIC BLOOD PRESSURE: 58 MMHG | WEIGHT: 130 LBS | HEART RATE: 96 BPM | BODY MASS INDEX: 20.98 KG/M2

## 2018-08-22 DIAGNOSIS — C34.12 MALIGNANT NEOPLASM OF BRONCHUS OF LEFT UPPER LOBE (HCC): ICD-10-CM

## 2018-08-22 DIAGNOSIS — I10 HTN (HYPERTENSION), BENIGN: Primary | ICD-10-CM

## 2018-08-22 DIAGNOSIS — Z98.890 S/P THORACOTOMY: ICD-10-CM

## 2018-08-22 DIAGNOSIS — R59.0 MEDIASTINAL LYMPHADENOPATHY: ICD-10-CM

## 2018-08-22 PROCEDURE — 99024 POSTOP FOLLOW-UP VISIT: CPT | Performed by: THORACIC SURGERY (CARDIOTHORACIC VASCULAR SURGERY)

## 2018-08-22 RX ORDER — RANITIDINE 150 MG/1
150 CAPSULE ORAL 2 TIMES DAILY
Status: ON HOLD | COMMUNITY
End: 2019-01-01 | Stop reason: HOSPADM

## 2018-08-28 ENCOUNTER — CARE COORDINATION (OUTPATIENT)
Dept: CASE MANAGEMENT | Age: 73
End: 2018-08-28

## 2018-08-28 NOTE — CARE COORDINATION
[facility] Transitions Follow Up Call    2018    Patient: Yosef Canada  Patient : 1945   MRN: [id number]  Reason for Admission: Malignant Neoplasm of Bronchus of Left Lobe S/p thoracotomy    Discharge Date: 8/15/18 RARS: Readmission Risk Score: 22       3rd and final attempt made to reach patient for follow up call. Left a voice message for patient with my contact information and informed of final outreach attempt.            Follow Up  Future Appointments  Date Time Provider Agueda Kiser   2019 9:30 AM Royal Juan Luis MD [facility] [place] MELLO Calvillo RN

## 2018-08-29 ENCOUNTER — TELEPHONE (OUTPATIENT)
Dept: CARDIOTHORACIC SURGERY | Age: 73
End: 2018-08-29

## 2018-08-29 NOTE — TELEPHONE ENCOUNTER
Spoke with patient's significant otherToan Aj regarding schedule of out-patient port-a- cath placement on 9/6/2018 @ 12:30 pm with arrival time of 10:30 am @ [facility] with Dr. Eliel Umana

## 2018-09-02 ENCOUNTER — HOSPITAL ENCOUNTER (INPATIENT)
Age: 73
LOS: 7 days | Discharge: NURSING, REHAB OR LONG-TERM CARE | DRG: 037 | End: 2018-09-10
Attending: EMERGENCY MEDICINE | Admitting: INTERNAL MEDICINE
Payer: MEDICARE

## 2018-09-02 ENCOUNTER — APPOINTMENT (OUTPATIENT)
Dept: CT IMAGING | Age: 73
DRG: 037 | End: 2018-09-02
Payer: MEDICARE

## 2018-09-02 ENCOUNTER — APPOINTMENT (OUTPATIENT)
Dept: GENERAL RADIOLOGY | Age: 73
DRG: 037 | End: 2018-09-02
Payer: MEDICARE

## 2018-09-02 DIAGNOSIS — R53.1 ACUTE RIGHT-SIDED WEAKNESS: Primary | ICD-10-CM

## 2018-09-02 LAB
A/G RATIO: 1.4 (ref 1.1–2.2)
ALBUMIN SERPL-MCNC: 4 G/DL (ref 3.4–5)
ALP BLD-CCNC: 95 U/L (ref 40–129)
ALT SERPL-CCNC: 7 U/L (ref 10–40)
ANION GAP SERPL CALCULATED.3IONS-SCNC: 13 MMOL/L (ref 3–16)
AST SERPL-CCNC: 14 U/L (ref 15–37)
BILIRUB SERPL-MCNC: 0.5 MG/DL (ref 0–1)
BUN BLDV-MCNC: 15 MG/DL (ref 7–20)
CALCIUM SERPL-MCNC: 9.5 MG/DL (ref 8.3–10.6)
CHLORIDE BLD-SCNC: 103 MMOL/L (ref 99–110)
CO2: 26 MMOL/L (ref 21–32)
CREAT SERPL-MCNC: 0.9 MG/DL (ref 0.6–1.2)
GFR AFRICAN AMERICAN: >60
GFR NON-AFRICAN AMERICAN: >60
GLOBULIN: 2.8 G/DL
GLUCOSE BLD-MCNC: 110 MG/DL (ref 70–99)
GLUCOSE BLD-MCNC: 115 MG/DL (ref 70–99)
HCT VFR BLD CALC: 37 % (ref 36–48)
HEMOGLOBIN: 12.6 G/DL (ref 12–16)
INR BLD: 1.07 (ref 0.86–1.14)
MCH RBC QN AUTO: 29.9 PG (ref 26–34)
MCHC RBC AUTO-ENTMCNC: 34.2 G/DL (ref 31–36)
MCV RBC AUTO: 87.4 FL (ref 80–100)
PDW BLD-RTO: 12.4 % (ref 12.4–15.4)
PERFORMED ON: ABNORMAL
PLATELET # BLD: 322 K/UL (ref 135–450)
PMV BLD AUTO: 8.3 FL (ref 5–10.5)
POTASSIUM REFLEX MAGNESIUM: 3.8 MMOL/L (ref 3.5–5.1)
PROTHROMBIN TIME: 12.2 SEC (ref 9.8–13)
RBC # BLD: 4.23 M/UL (ref 4–5.2)
SODIUM BLD-SCNC: 142 MMOL/L (ref 136–145)
TOTAL PROTEIN: 6.8 G/DL (ref 6.4–8.2)
TROPONIN: <0.01 NG/ML
WBC # BLD: 9.9 K/UL (ref 4–11)

## 2018-09-02 PROCEDURE — 99285 EMERGENCY DEPT VISIT HI MDM: CPT

## 2018-09-02 PROCEDURE — 2580000003 HC RX 258: Performed by: EMERGENCY MEDICINE

## 2018-09-02 PROCEDURE — 84484 ASSAY OF TROPONIN QUANT: CPT

## 2018-09-02 PROCEDURE — 70450 CT HEAD/BRAIN W/O DYE: CPT

## 2018-09-02 PROCEDURE — 85610 PROTHROMBIN TIME: CPT

## 2018-09-02 PROCEDURE — 81001 URINALYSIS AUTO W/SCOPE: CPT

## 2018-09-02 PROCEDURE — 51702 INSERT TEMP BLADDER CATH: CPT

## 2018-09-02 PROCEDURE — 93005 ELECTROCARDIOGRAM TRACING: CPT | Performed by: EMERGENCY MEDICINE

## 2018-09-02 PROCEDURE — 6370000000 HC RX 637 (ALT 250 FOR IP): Performed by: EMERGENCY MEDICINE

## 2018-09-02 PROCEDURE — 80053 COMPREHEN METABOLIC PANEL: CPT

## 2018-09-02 PROCEDURE — 71046 X-RAY EXAM CHEST 2 VIEWS: CPT

## 2018-09-02 PROCEDURE — 85027 COMPLETE CBC AUTOMATED: CPT

## 2018-09-02 RX ORDER — 0.9 % SODIUM CHLORIDE 0.9 %
1000 INTRAVENOUS SOLUTION INTRAVENOUS ONCE
Status: COMPLETED | OUTPATIENT
Start: 2018-09-02 | End: 2018-09-03

## 2018-09-02 RX ORDER — ASPIRIN 81 MG/1
324 TABLET, CHEWABLE ORAL ONCE
Status: COMPLETED | OUTPATIENT
Start: 2018-09-02 | End: 2018-09-02

## 2018-09-02 RX ORDER — CLOPIDOGREL BISULFATE 75 MG/1
300 TABLET ORAL ONCE
Status: COMPLETED | OUTPATIENT
Start: 2018-09-02 | End: 2018-09-02

## 2018-09-02 RX ADMIN — SODIUM CHLORIDE 1000 ML: 9 INJECTION, SOLUTION INTRAVENOUS at 22:53

## 2018-09-02 RX ADMIN — ASPIRIN 81 MG 324 MG: 81 TABLET ORAL at 22:53

## 2018-09-02 RX ADMIN — CLOPIDOGREL BISULFATE 300 MG: 75 TABLET ORAL at 22:53

## 2018-09-03 PROBLEM — I63.9 ACUTE CEREBROVASCULAR ACCIDENT (CVA) (HCC): Status: ACTIVE | Noted: 2018-09-03

## 2018-09-03 LAB
BACTERIA: ABNORMAL /HPF
BILIRUBIN URINE: ABNORMAL
BLOOD, URINE: ABNORMAL
CLARITY: CLEAR
COLOR: YELLOW
CRYSTALS, UA: ABNORMAL /HPF
GLUCOSE URINE: NEGATIVE MG/DL
KETONES, URINE: ABNORMAL MG/DL
LACTIC ACID: 0.9 MMOL/L (ref 0.4–2)
LEUKOCYTE ESTERASE, URINE: NEGATIVE
MICROSCOPIC EXAMINATION: YES
NITRITE, URINE: NEGATIVE
PH UA: 6
PROTEIN UA: 30 MG/DL
RBC UA: >100 /HPF (ref 0–2)
SPECIFIC GRAVITY UA: >=1.03
SPECIMEN STATUS: NORMAL
TROPONIN: <0.01 NG/ML
TROPONIN: <0.01 NG/ML
URINE TYPE: ABNORMAL
UROBILINOGEN, URINE: 1 E.U./DL
WBC UA: ABNORMAL /HPF (ref 0–5)

## 2018-09-03 PROCEDURE — G8997 SWALLOW GOAL STATUS: HCPCS

## 2018-09-03 PROCEDURE — G8987 SELF CARE CURRENT STATUS: HCPCS

## 2018-09-03 PROCEDURE — 97110 THERAPEUTIC EXERCISES: CPT

## 2018-09-03 PROCEDURE — 6370000000 HC RX 637 (ALT 250 FOR IP): Performed by: NURSE PRACTITIONER

## 2018-09-03 PROCEDURE — G8996 SWALLOW CURRENT STATUS: HCPCS

## 2018-09-03 PROCEDURE — 92523 SPEECH SOUND LANG COMPREHEN: CPT

## 2018-09-03 PROCEDURE — 97530 THERAPEUTIC ACTIVITIES: CPT

## 2018-09-03 PROCEDURE — 94664 DEMO&/EVAL PT USE INHALER: CPT

## 2018-09-03 PROCEDURE — 99223 1ST HOSP IP/OBS HIGH 75: CPT | Performed by: PSYCHIATRY & NEUROLOGY

## 2018-09-03 PROCEDURE — G8979 MOBILITY GOAL STATUS: HCPCS

## 2018-09-03 PROCEDURE — 92610 EVALUATE SWALLOWING FUNCTION: CPT

## 2018-09-03 PROCEDURE — 2580000003 HC RX 258: Performed by: NURSE PRACTITIONER

## 2018-09-03 PROCEDURE — 97162 PT EVAL MOD COMPLEX 30 MIN: CPT

## 2018-09-03 PROCEDURE — 93010 ELECTROCARDIOGRAM REPORT: CPT | Performed by: INTERNAL MEDICINE

## 2018-09-03 PROCEDURE — 36415 COLL VENOUS BLD VENIPUNCTURE: CPT

## 2018-09-03 PROCEDURE — G8978 MOBILITY CURRENT STATUS: HCPCS

## 2018-09-03 PROCEDURE — 1200000000 HC SEMI PRIVATE

## 2018-09-03 PROCEDURE — 97167 OT EVAL HIGH COMPLEX 60 MIN: CPT

## 2018-09-03 PROCEDURE — 84484 ASSAY OF TROPONIN QUANT: CPT

## 2018-09-03 PROCEDURE — 94150 VITAL CAPACITY TEST: CPT

## 2018-09-03 PROCEDURE — 97116 GAIT TRAINING THERAPY: CPT

## 2018-09-03 PROCEDURE — 83605 ASSAY OF LACTIC ACID: CPT

## 2018-09-03 PROCEDURE — 6360000002 HC RX W HCPCS: Performed by: NURSE PRACTITIONER

## 2018-09-03 PROCEDURE — 92526 ORAL FUNCTION THERAPY: CPT

## 2018-09-03 RX ORDER — SODIUM CHLORIDE 0.9 % (FLUSH) 0.9 %
10 SYRINGE (ML) INJECTION PRN
Status: DISCONTINUED | OUTPATIENT
Start: 2018-09-03 | End: 2018-09-10 | Stop reason: HOSPADM

## 2018-09-03 RX ORDER — ASPIRIN 81 MG/1
81 TABLET, CHEWABLE ORAL DAILY
Status: DISCONTINUED | OUTPATIENT
Start: 2018-09-03 | End: 2018-09-10 | Stop reason: HOSPADM

## 2018-09-03 RX ORDER — ATORVASTATIN CALCIUM 10 MG/1
20 TABLET, FILM COATED ORAL NIGHTLY
Status: DISCONTINUED | OUTPATIENT
Start: 2018-09-03 | End: 2018-09-10 | Stop reason: HOSPADM

## 2018-09-03 RX ORDER — CHOLECALCIFEROL (VITAMIN D3) 125 MCG
1000 CAPSULE ORAL DAILY
Status: DISCONTINUED | OUTPATIENT
Start: 2018-09-03 | End: 2018-09-09

## 2018-09-03 RX ORDER — DOCUSATE SODIUM 100 MG/1
100 CAPSULE, LIQUID FILLED ORAL 2 TIMES DAILY
Status: DISCONTINUED | OUTPATIENT
Start: 2018-09-03 | End: 2018-09-03

## 2018-09-03 RX ORDER — CLOPIDOGREL BISULFATE 75 MG/1
75 TABLET ORAL DAILY
Status: DISCONTINUED | OUTPATIENT
Start: 2018-09-03 | End: 2018-09-06

## 2018-09-03 RX ORDER — ASPIRIN 81 MG/1
81 TABLET ORAL DAILY
Status: DISCONTINUED | OUTPATIENT
Start: 2018-09-03 | End: 2018-09-03

## 2018-09-03 RX ORDER — M-VIT,TX,IRON,MINS/CALC/FOLIC 27MG-0.4MG
1 TABLET ORAL DAILY
Status: DISCONTINUED | OUTPATIENT
Start: 2018-09-03 | End: 2018-09-09

## 2018-09-03 RX ORDER — ALBUTEROL SULFATE 90 UG/1
2 AEROSOL, METERED RESPIRATORY (INHALATION) EVERY 6 HOURS PRN
Status: DISCONTINUED | OUTPATIENT
Start: 2018-09-03 | End: 2018-09-10 | Stop reason: HOSPADM

## 2018-09-03 RX ORDER — LOSARTAN POTASSIUM 25 MG/1
50 TABLET ORAL DAILY
Status: DISCONTINUED | OUTPATIENT
Start: 2018-09-03 | End: 2018-09-07

## 2018-09-03 RX ORDER — TRAMADOL HYDROCHLORIDE 50 MG/1
25 TABLET ORAL EVERY 6 HOURS PRN
Status: DISCONTINUED | OUTPATIENT
Start: 2018-09-03 | End: 2018-09-09

## 2018-09-03 RX ORDER — OXYBUTYNIN CHLORIDE 5 MG/1
5 TABLET ORAL 2 TIMES DAILY
Status: DISCONTINUED | OUTPATIENT
Start: 2018-09-03 | End: 2018-09-03

## 2018-09-03 RX ORDER — SODIUM CHLORIDE 0.9 % (FLUSH) 0.9 %
10 SYRINGE (ML) INJECTION EVERY 12 HOURS SCHEDULED
Status: DISCONTINUED | OUTPATIENT
Start: 2018-09-03 | End: 2018-09-10 | Stop reason: HOSPADM

## 2018-09-03 RX ORDER — POLYETHYLENE GLYCOL 3350 17 G/17G
17 POWDER, FOR SOLUTION ORAL DAILY
Status: DISCONTINUED | OUTPATIENT
Start: 2018-09-03 | End: 2018-09-10 | Stop reason: HOSPADM

## 2018-09-03 RX ORDER — FAMOTIDINE 20 MG/1
20 TABLET, FILM COATED ORAL 2 TIMES DAILY
Status: DISCONTINUED | OUTPATIENT
Start: 2018-09-03 | End: 2018-09-06

## 2018-09-03 RX ORDER — GABAPENTIN 300 MG/1
300 CAPSULE ORAL EVERY EVENING
Status: DISCONTINUED | OUTPATIENT
Start: 2018-09-03 | End: 2018-09-10 | Stop reason: HOSPADM

## 2018-09-03 RX ORDER — FAMOTIDINE 20 MG/1
20 TABLET, FILM COATED ORAL 2 TIMES DAILY
Status: DISCONTINUED | OUTPATIENT
Start: 2018-09-03 | End: 2018-09-03

## 2018-09-03 RX ORDER — LABETALOL HYDROCHLORIDE 5 MG/ML
10 INJECTION, SOLUTION INTRAVENOUS EVERY 10 MIN PRN
Status: DISCONTINUED | OUTPATIENT
Start: 2018-09-03 | End: 2018-09-10 | Stop reason: HOSPADM

## 2018-09-03 RX ORDER — ONDANSETRON 2 MG/ML
4 INJECTION INTRAMUSCULAR; INTRAVENOUS EVERY 6 HOURS PRN
Status: DISCONTINUED | OUTPATIENT
Start: 2018-09-03 | End: 2018-09-06 | Stop reason: SDUPTHER

## 2018-09-03 RX ORDER — ATORVASTATIN CALCIUM 10 MG/1
20 TABLET, FILM COATED ORAL DAILY
Status: DISCONTINUED | OUTPATIENT
Start: 2018-09-03 | End: 2018-09-03

## 2018-09-03 RX ORDER — DOCUSATE SODIUM 100 MG/1
100 CAPSULE, LIQUID FILLED ORAL 2 TIMES DAILY
Status: DISCONTINUED | OUTPATIENT
Start: 2018-09-03 | End: 2018-09-10 | Stop reason: HOSPADM

## 2018-09-03 RX ORDER — OXYBUTYNIN CHLORIDE 5 MG/1
5 TABLET ORAL 2 TIMES DAILY
Status: DISCONTINUED | OUTPATIENT
Start: 2018-09-03 | End: 2018-09-10 | Stop reason: HOSPADM

## 2018-09-03 RX ORDER — CITALOPRAM 20 MG/1
20 TABLET ORAL DAILY
Status: DISCONTINUED | OUTPATIENT
Start: 2018-09-03 | End: 2018-09-10 | Stop reason: HOSPADM

## 2018-09-03 RX ADMIN — ENOXAPARIN SODIUM 40 MG: 40 INJECTION SUBCUTANEOUS at 08:01

## 2018-09-03 RX ADMIN — GABAPENTIN 300 MG: 300 CAPSULE ORAL at 17:25

## 2018-09-03 RX ADMIN — FAMOTIDINE 20 MG: 20 TABLET, FILM COATED ORAL at 02:45

## 2018-09-03 RX ADMIN — ASPIRIN 81 MG 81 MG: 81 TABLET ORAL at 08:00

## 2018-09-03 RX ADMIN — ATORVASTATIN CALCIUM 20 MG: 10 TABLET, FILM COATED ORAL at 20:35

## 2018-09-03 RX ADMIN — OXYBUTYNIN CHLORIDE 5 MG: 5 TABLET ORAL at 02:46

## 2018-09-03 RX ADMIN — SODIUM CHLORIDE, PRESERVATIVE FREE 10 ML: 5 INJECTION INTRAVENOUS at 20:36

## 2018-09-03 RX ADMIN — VITAMIN D, TAB 1000IU (100/BT) 2000 UNITS: 25 TAB at 08:00

## 2018-09-03 RX ADMIN — Medication 1 TABLET: at 08:00

## 2018-09-03 RX ADMIN — OXYBUTYNIN CHLORIDE 5 MG: 5 TABLET ORAL at 20:36

## 2018-09-03 RX ADMIN — CLOPIDOGREL BISULFATE 75 MG: 75 TABLET ORAL at 08:00

## 2018-09-03 RX ADMIN — DOCUSATE SODIUM 100 MG: 100 CAPSULE, LIQUID FILLED ORAL at 02:46

## 2018-09-03 RX ADMIN — CYANOCOBALAMIN TAB 500 MCG 1000 MCG: 500 TAB at 08:00

## 2018-09-03 RX ADMIN — CITALOPRAM HYDROBROMIDE 20 MG: 20 TABLET ORAL at 08:00

## 2018-09-03 RX ADMIN — TRAMADOL HYDROCHLORIDE 25 MG: 50 TABLET, FILM COATED ORAL at 02:46

## 2018-09-03 RX ADMIN — SODIUM CHLORIDE, PRESERVATIVE FREE 10 ML: 5 INJECTION INTRAVENOUS at 07:59

## 2018-09-03 RX ADMIN — DOCUSATE SODIUM 100 MG: 100 CAPSULE, LIQUID FILLED ORAL at 20:36

## 2018-09-03 RX ADMIN — FAMOTIDINE 20 MG: 20 TABLET, FILM COATED ORAL at 20:35

## 2018-09-03 RX ADMIN — LOSARTAN POTASSIUM 50 MG: 25 TABLET, FILM COATED ORAL at 07:59

## 2018-09-03 ASSESSMENT — PAIN SCALES - GENERAL
PAINLEVEL_OUTOF10: 9
PAINLEVEL_OUTOF10: 0

## 2018-09-03 NOTE — CONSULTS
In patient Neurology consult        [facility] Neurology      MD Janette Soni  1945    Date of Service: 9/3/2018    Referring Physician: Myla Gunn MD      Reason for the consult: Acute aphasia    HPI:   The patient is a 67y.o.  years old female with history of hypertension, CAD and hyperlipidemia who came in to [facility] last night with acute aphasia. Symptoms started 2-3 days ago. She describes sudden onset of confusion, difficulty understanding and speaking, ataxia and right-sided weakness. No triggers or other associated symptoms. No falling or passing out. No recent fever or chills. She felt unsteady and dizzy for a few minutes. Degree was severe. Duration was persistent. Symptoms persisted and family decided to bring her to the hospital where she was eventually admitted. The patient was diagnosed recently with bronchial carcinoma. She was going to have port placement in the next few days and aspirin was stopeed few days ago. Last MRI in June of showed no evidence of acute lesion in her brain. Her POA described recurrent episodes of aphasia and confusion over the last few weeks. The patient was loaded with Plavix in the ED and now she is  on aspirin, Plavix and statin. She feels somewhat better compared to yesterday. She denies any headache, chest pain, dysphagia, neck or back. Other review of system was unremarkable. No prior history of A. fib.       Past Medical History:   Diagnosis Date    Acute cerebrovascular accident (CVA) ([facility] 9/3/2018    Alcohol abuse 5/22/2018    Asthma     COPD, severe ([address] 5/26/2015    Coronary artery disease involving native coronary artery of native heart without angina pectoris 8/4/2017    Essential hypertension     GERD (gastroesophageal reflux disease)     Hyperlipidemia     Hypertension     Injury of esophagus     inflamed    Insomnia     Kidney stone [id number]    Malignant neoplasm of bronchus of left upper lobe ([facility] 8/13/2018    Mass of upper lobe of left lung 08/2017    Neuropathy 11/13/2014    Osteoarthritis     Prolonged emergence from general anesthesia     Urinary incontinence      Family History   Problem Relation Age of Onset    High Blood Pressure Mother     Cancer Mother [de-identified]        Breast and Lung    Cancer Sister 52        Brain Tumor    Heart Disease Brother 39    Cancer Maternal Grandmother         Breast    Other Father         abdominal aneurysm    Glaucoma Father     Cancer Paternal Uncle         lung    Diabetes Sister     High Blood Pressure Sister     High Cholesterol Sister     Other Sister         Gallstone    Cancer Sister         Breast    Cancer Sister         brain tumor     Past Surgical History:   Procedure Laterality Date    BRONCHOSCOPY  08/16/2017    Dr. Chaya Estrada - w/BAL of LATANYA, LLL; EBUS guided lymph node biopsies, TBNA of LATANYA lung mass    COLONOSCOPY  02/05/2013    Dr. Sierra Londono - Moraima diminutive sessile rectal polyps    COLONOSCOPY  02/02/2010    Dr. Sierra Londono - adenomatous colon polyps    HYSTERECTOMY, TOTAL ABDOMINAL      LITHOTRIPSY      DC OFFICE/OUTPT VISIT,PROCEDURE ONLY Left 8/13/2018    LEFT VIDEO ASSISTED THORACOSCOPIC SURGERY WITH BIOPSY OF AP WINDOW LYMPH NODE, POSSIBLE LEFT UPPER LOBE WEDGE RESECTION          CPT CODES - 51407, 98095 performed by Marianna Harman MD at [address] [place] [address] Left 08/13/2018    Dr. Martita Marte - video assisted w/wedge resection of LATANYA, MSLND     Past Surgical History:   Procedure Laterality Date    BRONCHOSCOPY  08/16/2017    Dr. Chaya Estrada - w/BAL of LATANYA, LLL; EBUS guided lymph node biopsies, TBNA of LATANYA lung mass    COLONOSCOPY  02/05/2013    Dr. Sierra Londono - Moraima diminutive sessile rectal polyps    COLONOSCOPY  02/02/2010    Dr. Sierra Londono - adenomatous colon polyps    HYSTERECTOMY, TOTAL ABDOMINAL      LITHOTRIPSY      DC OFFICE/OUTPT VISIT,PROCEDURE ONLY Left 8/13/2018    LEFT VIDEO ASSISTED THORACOSCOPIC SURGERY WITH BIOPSY OF AP WINDOW LYMPH NODE, POSSIBLE LEFT UPPER LOBE WEDGE RESECTION          CPT CODES - 48072, 51221 performed by Jose Forde MD at [facility] Left 08/13/2018    Dr. Narinder Alvarenga - video assisted w/wedge resection of LATANYA, MSLND     Family History   Problem Relation Age of Onset    High Blood Pressure Mother     Cancer Mother [de-identified]        Breast and Lung    Cancer Sister 52        Brain Tumor    Heart Disease Brother 39    Cancer Maternal Grandmother         Breast    Other Father         abdominal aneurysm    Glaucoma Father     Cancer Paternal Uncle         lung    Diabetes Sister     High Blood Pressure Sister     High Cholesterol Sister     Other Sister         Gallstone    Cancer Sister         Breast    Cancer Sister         brain tumor     Social History   Substance Use Topics    Smoking status: Former Smoker     Packs/day: 1.00     Years: 50.00     Types: Cigarettes     Quit date: 8/7/2018    Smokeless tobacco: Never Used    Alcohol use No      Comment: 5/18- No alcohol     Allergies   Allergen Reactions    Codeine Itching    Morphine Itching and Other (See Comments)     On file at [email or web address]     Current Facility-Administered Medications   Medication Dose Route Frequency Provider Last Rate Last Dose    LIP BALM ointment             albuterol sulfate  (90 Base) MCG/ACT inhaler 2 puff  2 puff Inhalation Q6H PRN SORAIDA Masters CNP        aspirin chewable tablet 81 mg  81 mg Oral Daily SORAIDA Masters CNP   81 mg at 09/03/18 0800    vitamin D (CHOLECALCIFEROL) tablet 2,000 Units  2,000 Units Oral Daily SORAIDA Masters CNP   2,000 Units at 09/03/18 0800    citalopram (CELEXA) tablet 20 mg  20 mg Oral Daily SORAIDA Masters CNP   20 mg at 09/03/18 0800    gabapentin (NEURONTIN) capsule 300 mg  300 mg Oral QPM SORAIDA Masters CNP        losartan (COZAAR) tablet 50 mg  50 mg Oral Daily Last Dose    0.9 % sodium chloride infusion   Intravenous Once Mikhail Gandhi MD           ROS : A 10-12 system review of constitutional, cardiovascular, respiratory, musculoskeletal, endocrine, skin, hematological, SHEENT, genitourinary, psychiatric and neurologic systems was obtained and updated today and is unremarkable except as mentioned in my HPI      Exam:   Constitutional:   Vitals:    09/02/18 2250 09/03/18 0104 09/03/18 0230 09/03/18 0756   BP: 122/65 (!) 176/73 (!) 153/79 (!) 164/81   Pulse: 60 59 61 54   Resp: 16 23 20 18   Temp:  98.3 °F (36.8 °C) 97.9 °F (36.6 °C) 97.5 °F (36.4 °C)   TempSrc:  Oral Oral Oral   SpO2: 100% 98% 99% 97%   Weight:       Height:           General appearance: NAD  Eye: No icterus. No blurring of optic disc. Neck: supple  Cardiovascular: No carotid bruit. No lower leg edema with good pulsation. Mental Status: Oriented to person, place, problem, and time. Fluent speech. Poor fund of knowledge. Normal attention span and concentration. Cranial Nerves:   II: Visual fields: Full to confrontation  III: Pupils: equal, round, reactive to light  III,IV,VI: Extra Ocular Movements are intact. No nystagmus  V: Facial sensation is intact to pin prick and light touch  VII: Facial strength and movements: intact and symmetric smile,cheek puffing and eyebrow elevation  VIII: Hearing: Intact to finger rub bilaterally  IX: Palate elevation is symmetric  XI: Shoulder shrug is intact  XII: Tongue movements are normal  Musculoskeletal: 4/5 n all 4 extremities. Normal tone. Reflexes: Bilateral biceps 2/4, triceps 2/4, brachial radialis 2/4, knee 1/4 and ankle 1/4. Planters: flexor bilaterally. Coordination: no pronator drift, no dysmetria. Finger nose finger testing within normal limits. Sensation: normal to all modalities.   Gait/Posture: unsteady    Data:  LABS:   Lab Results   Component Value Date     09/02/2018    K 3.8 09/02/2018     09/02/2018    CO2 26

## 2018-09-03 NOTE — PLAN OF CARE
Problem: Musculor/Skeletal Functional Status  Intervention: OT Evaluation/treatment  To return pt to baseline function with ADLs and functional t/fs.

## 2018-09-03 NOTE — PROGRESS NOTES
she does have upper dentures but does not wear them to eat. Pt and  deny recurring pneumonia. Pt does have s/s of GERD,  (POA) stated she takes medications to manage. Pt/ report frequent TIA's. Per report - pt took meds whole in water without difficulty this morning. Prior to SLP entry - pt had a cup of thin water on her bedside table, sipping on it prior to evaluation. Also per MD note - pt has newly diagnosed malignant neoplasm of bronchus of left upper lobe; s/p wedge resection on 8/13/18. Pain:  Patient Currently in Pain: Denies    Impression  Dysphagia Diagnosis: Mild oral stage dysphagia;Mild pharyngeal stage dysphagia  Dysphagia Outcome Severity Scale: Level 6: Within functional limits/Modified independence   · Mild oral phase deficits described by slightly prolonged mastication of regular solids with minimal post-swallow lingual residue. This may be primarily r/t inadequate dentition, however, per report from pt and  pt does not typically wear her dentures at home either. We are likely seeing her baseline dentition status at this time (edentulous upper, some missing lower). Despite mild deficits, oral phase judged to be overall effective. Discussed softer diet options with pt and ; all in agreement to cont with regular consistency diet and encouraging softer food choices. · No overt s/s of aspiration observed throughout evaluation. No coughing, throat clearing, wet vocal quality. Laryngeal elevation delayed and reduced upon palpation, however, this does not appear to negatively impact safety during the swallow. · Recommend regular solids (encouraging softer food choices) with thin liquids. Meds whole with water as tolerated. ST to follow. Treatment Plan  Requires SLP Intervention: Yes  Duration/Frequency of Treatment: 3-5x/week    Recommended Diet and Intervention  Diet Solids Recommendation: Regular (Encourage pt to choose softer foods. )  Liquid Consistency Recommendation: Thin  Recommended Form of Meds: Whole with water (As tolerated. Can do whole in puree if needed. )    Therapeutic Interventions: Diet tolerance monitoring; Therapeutic PO trials with SLP;Patient/Family education    Compensatory Swallowing Strategies  Compensatory Swallowing Strategies: Alternate solids and liquids;Eat/Feed slowly;Upright as possible for all oral intake;Remain upright for 30-45 minutes after meals;Small bites/sips    Treatment/Goals  Short-term Goals  Timeframe for Short-term Goals: 5 days  Goal 1: The patient will tolerate recommended diet wtihout observed clinical signs of aspiration. Goal 2: The patient/caregiver will demonstrate understanding of compensatory strategies and carryover during fx'al PO intake 100% of the time with minimal cueing. Long-term Goals  Timeframe for Long-term Goals: 7 Days  Goal 1: The patient will safely and consistently tolerate a least restrictive PO diet without difficulty or overt s/s of aspiration. General  Chart Reviewed: Yes  Subjective  Subjective: Pt seated upright in bed, alert and agreeable to participate.  present throughout duration of session. Behavior/Cognition: Cooperative;Pleasant mood; Alert  Respiratory Status: Room air  Communication Observation: Functional  Follows Directions: Simple  Dentition: Some missing teeth (Some missing natural lower dentition; edentulous upper. Pt and  state pt has upper dentures at home but DOES NOT typically wear them when she eats. )  Patient Positioning: Upright in bed  Baseline Vocal Quality: Normal  Prior Dysphagia History: Pt has been seen by SLP in the past for dysphagia tx. See EMR for details. Consistencies Administered: Puree;Reg solid; Thin - cup; Thin - straw    Vision/Hearing  Hearing  Hearing: Within functional limits    Oral Motor Deficits  Oral/Motor  Oral Motor:  Within functional limits    Oral Phase Dysfunction  Oral Phase  Oral Phase:

## 2018-09-03 NOTE — PLAN OF CARE
Problem: Neurological  Intervention: Speech Evaluation/treatment  Cog-Linguistic evaluation completed this date. All further notes may be found in EMR. [id number]  Speech-Language Pathologist        Problem: Nutrition  Intervention: Swallowing evaluation  Bedside swallow evaluation completed this date. All further notes may be found in EMR. [id number]  Speech-Language Pathologist    Intervention: Aspiration precautions  Bedside swallow evaluation completed this date. All further notes may be found in EMR.     [id number]  Speech-Language Pathologist

## 2018-09-03 NOTE — PLAN OF CARE
Problem: Pain:  Goal: Control of acute pain  Control of acute pain   Outcome: Ongoing  Patient asleep with no sign of distress at this time.

## 2018-09-03 NOTE — PLAN OF CARE
Problem: Pain:  Goal: Pain level will decrease  Pain level will decrease   Outcome: Ongoing  Denies pain at this time.

## 2018-09-03 NOTE — H&P
Hospital Medicine History & Physical      PCP: OSCAR OLIVIA MD    Date of Admission: 9/2/2018    Date of Service: Pt seen/examined on 9/3/18 and Admitted to Inpatient with expected LOS greater than two midnights due to medical therapy. Chief Complaint:  Difficulty coming up with words      History Of Present Illness: 67 y.o. 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 Patient was brought in by her significant other, who is not currently in room. Pt  is alternating with angery and crying. All that she said is that she is having a hard time thinking. Significant other stated that she was having right-sided weakness. She was weak and unable to walk, needing up in a w/c. She is obviously able to move herself all over the bed. HPI gotten from chart and nursing. States that she stopped taking her Plavix for a port placement on 9/6/18. 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 He had a wedge resection on 8/13/18 with Dr. Mk Burgess. Site is completely healed. Patient was also here on 8/18 for confusion, patient does have a just reaction with anxiety and depression.      Past Medical History:          Diagnosis Date    Acute cerebrovascular accident (CVA) ([facility] [address] 9/3/2018    Alcohol abuse 5/22/2018    Asthma     COPD, severe ([facility] [address] 5/26/2015    Coronary artery disease involving native coronary artery of native heart without angina pectoris 8/4/2017    Essential hypertension     GERD (gastroesophageal reflux disease)     Hyperlipidemia     Hypertension     Injury of esophagus     inflamed    Insomnia     Kidney stone [id number]    Malignant neoplasm of bronchus of stated age and uncooperative with exam.She alternates with anger and  crying. HEENT:  Normal cephalic, atraumatic without obvious deformity. Pupils equal, round, and reactive to light. Conjunctivae/corneas clear. Neck: Supple, with full range of motion. No jugular venous distention. Trachea midline. Respiratory:  Normal respiratory effort. Cardiovascular:  Regular rate and rhythm with normal S1/S2 without murmurs, rubs or gallops. Abdomen: Soft, non-tender, non-distended with normal bowel sounds. Musculoskeletal:  No clubbing, cyanosis or edema bilaterally. Full range of motion without deformity. Skin: Skin color, texture, turgor normal.  No rashes or lesions. Neurologic:  Uncooperative with exam  Psychiatric:  Alert and uncooperative with exam, alternating between anger and crying. Capillary Refill: Brisk,< 3 seconds   Peripheral Pulses: +2 palpable, equal bilaterally       Labs:     Recent Labs      09/02/18 2128   WBC  9.9   HGB  12.6   HCT  37.0   PLT  322     Recent Labs      09/02/18 2128   NA  142   K  3.8   CL  103   CO2  26   BUN  15   CREATININE  0.9   CALCIUM  9.5     Recent Labs      09/02/18 2128   AST  14*   ALT  7*   BILITOT  0.5   ALKPHOS  95     Recent Labs      09/02/18 2128   INR  1.07     Recent Labs      09/02/18 2128 09/03/18   0156   TROPONINI  <0.01  <0.01       Urinalysis:      Lab Results   Component Value Date    NITRU Negative 09/02/2018    WBCUA 3-5 09/02/2018    BACTERIA Rare 09/02/2018    RBCUA >100 09/02/2018    BLOODU LARGE 09/02/2018    SPECGRAV >=1.030 09/02/2018    GLUCOSEU Negative 09/02/2018       Radiology:     CXR: I have reviewed the CXR with the following interpretation: see below  EKG:  I have reviewed the EKG with the following interpretation: In ER    XR CHEST STANDARD (2 VW)   Final Result   No acute disease. CT HEAD WO CONTRAST   Final Result   No acute intracranial abnormality. Stable left caudate lobe lacunar stroke.       White

## 2018-09-03 NOTE — ED PROVIDER NOTES
[id number] performed by Doris Grubbs MD at [address] [place] 08/13/2018    Dr. Saad Negrete - video assisted w/wedge resection of LATANYA, MSLND     Family History   Problem Relation Age of Onset    High Blood Pressure Mother     Cancer Mother [de-identified]        Breast and Lung    Cancer Sister 52        Brain Tumor    Heart Disease Brother 39    Cancer Maternal Grandmother         Breast    Other Father         abdominal aneurysm    Glaucoma Father     Cancer Paternal Uncle         lung    Diabetes Sister     High Blood Pressure Sister     High Cholesterol Sister     Other Sister         Gallstone    Cancer Sister         Breast    Cancer Sister         brain tumor     Social History     Social History    Marital status:      Spouse name: N/A    Number of children: N/A    Years of education: N/A     Occupational History    Not on file.      Social History Main Topics    Smoking status: Former Smoker     Packs/day: 1.00     Years: 50.00     Types: Cigarettes     Quit date: 8/7/2018    Smokeless tobacco: Never Used    Alcohol use No      Comment: 5/18- No alcohol    Drug use: No    Sexual activity: No     Other Topics Concern    Not on file     Social History Narrative    No narrative on file     Current Facility-Administered Medications   Medication Dose Route Frequency Provider Last Rate Last Dose    albuterol sulfate  (90 Base) MCG/ACT inhaler 2 puff  2 puff Inhalation Q6H PRN SORAIDA Walton - CNP        aspirin chewable tablet 81 mg  81 mg Oral Daily SORAIDA Walton - CNP   81 mg at 09/03/18 0800    vitamin D (CHOLECALCIFEROL) tablet 2,000 Units  2,000 Units Oral Daily SORAIDA Walton - CNP   2,000 Units at 09/03/18 0800    citalopram (CELEXA) tablet 20 mg  20 mg Oral Daily SORAIDA Walton - CNP   20 mg at 09/03/18 0800    gabapentin (NEURONTIN) capsule 300 mg  300 mg Oral QPM OSRAIDA Walton CNP   300 mg at 09/03/18 1725    losartan (COZAAR) tablet 50 mg  50 mg Oral Daily Lizzy Eglin, APRN - CNP   50 mg at 09/03/18 4512    therapeutic multivitamin-minerals 1 tablet  1 tablet Oral Daily Lizzy Eglin, APRN - CNP   1 tablet at 09/03/18 0800    vitamin B-12 (CYANOCOBALAMIN) tablet 1,000 mcg  1,000 mcg Oral Daily Lizzy Eglin, APRN - CNP   1,000 mcg at 09/03/18 0800    sodium chloride flush 0.9 % injection 10 mL  10 mL Intravenous 2 times per day Lizzy Eglin, APRN - CNP   10 mL at 09/03/18 2036    sodium chloride flush 0.9 % injection 10 mL  10 mL Intravenous PRN Lizzy Eglin, APRN - CNP        magnesium hydroxide (MILK OF MAGNESIA) 400 MG/5ML suspension 30 mL  30 mL Oral Daily PRN Lizzy Eglin, APRN - CNP        ondansetron [facility]) injection 4 mg  4 mg Intravenous Q6H PRN Lizzy Eglin, APRN - CNP        enoxaparin (LOVENOX) injection 40 mg  40 mg Subcutaneous Daily Lizzy Eglin, APRN - CNP   40 mg at 09/03/18 0801    clopidogrel (PLAVIX) tablet 75 mg  75 mg Oral Daily Lizzy Eglin, APRN - CNP   75 mg at 09/03/18 0800    labetalol (NORMODYNE;TRANDATE) injection 10 mg  10 mg Intravenous Q10 Min PRN Lizzy Eglin, APRN - CNP        atorvastatin (LIPITOR) tablet 20 mg  20 mg Oral Nightly Lizzy Eglin, APRN - CNP   20 mg at 09/03/18 2035    oxybutynin (DITROPAN) tablet 5 mg  5 mg Oral BID Lizzy Eglin, APRN - CNP   5 mg at 09/03/18 2036    famotidine (PEPCID) tablet 20 mg  20 mg Oral BID Lzizy Eglin, APRN - CNP   20 mg at 09/03/18 2035    docusate sodium (COLACE) capsule 100 mg  100 mg Oral BID Lizzy Eglin, APRN - CNP   100 mg at 09/03/18 2036    traMADol (ULTRAM) tablet 25 mg  25 mg Oral Q6H PRN Lizzy Eglin, APRN - CNP   25 mg at 09/03/18 0246    polyethylene glycol (GLYCOLAX) packet 17 g  17 g Oral Daily SORAIDA Caban - CNP         Facility-Administered 10.6 mg/dL    Total Protein 6.8 6.4 - 8.2 g/dL    Alb 4.0 3.4 - 5.0 g/dL    Albumin/Globulin Ratio 1.4 1.1 - 2.2    Total Bilirubin 0.5 0.0 - 1.0 mg/dL    Alkaline Phosphatase 95 40 - 129 U/L    ALT 7 (L) 10 - 40 U/L    AST 14 (L) 15 - 37 U/L    Globulin 2.8 g/dL   Protime-INR   Result Value Ref Range    Protime 12.2 9.8 - 13.0 sec    INR 1.07 0.86 - 1.14   Troponin   Result Value Ref Range    Troponin <0.01 <0.01 ng/mL   Sample possible blood bank testing   Result Value Ref Range    Specimen Status TRI    Urinalysis, reflex to microscopic   Result Value Ref Range    Color, UA Yellow Straw/Yellow    Clarity, UA Clear Clear    Glucose, Ur Negative Negative mg/dL    Bilirubin Urine SMALL (A) Negative    Ketones, Urine TRACE (A) Negative mg/dL    Specific Gravity, UA >=1.030 1.005 - 1.030    Blood, Urine LARGE (A) Negative    pH, UA 6.0 5.0 - 8.0    Protein, UA 30 (A) Negative mg/dL    Urobilinogen, Urine 1.0 <2.0 E.U./dL    Nitrite, Urine Negative Negative    Leukocyte Esterase, Urine Negative Negative    Microscopic Examination YES     Urine Type Not Specified    Lactic Acid, Plasma   Result Value Ref Range    Lactic Acid 0.9 0.4 - 2.0 mmol/L   Microscopic Urinalysis   Result Value Ref Range    WBC, UA 3-5 0 - 5 /HPF    RBC, UA >100 (A) 0 - 2 /HPF    Bacteria, UA Rare (A) /HPF    Crystals Few Ca.  Oxalate (A) /HPF   Troponin   Result Value Ref Range    Troponin <0.01 <0.01 ng/mL   Troponin   Result Value Ref Range    Troponin <0.01 <0.01 ng/mL   POCT Glucose   Result Value Ref Range    POC Glucose 115 (H) 70 - 99 mg/dl    Performed on ACCU-LoopMeK    EKG 12 Lead   Result Value Ref Range    Ventricular Rate 72 BPM    Atrial Rate 72 BPM    P-R Interval 168 ms    QRS Duration 86 ms    Q-T Interval 418 ms    QTc Calculation (Bazett) 457 ms    P Axis 100 degrees    R Axis 70 degrees    T Axis 84 degrees    Diagnosis       Sinus rhythm with Premature atrial complexesNonspecific T wave abnormalityAbnormal ECGWhen compared with necessary from their history, physical and studies, I have considered and evaluated Casimiro Diez for the following diagnoses:  DIABETES, INTRACRANIAL HEMORRHAGE, MENINGITIS, SUBARACHNOID HEMORRHAGE, SUBDURAL HEMATOMA, & STROKE. t-PA NOT given due to the following EXCLUSION CRITERIA (only those checked):  [] Pregnancy  [] Symptoms > 3 hours of onset  [x] Minor or isolated neurological signs  [] Rapid improvement of stroke symptoms  [] Seizure at the same time of stroke symptoms  [] Active bleeding or acute trauma (fracture)  [] Presentation consistent with acute MI or post-MI pericarditis  [] Known intracranial neoplasm, AV malformation or aneurysm  [] CT evidence of intracranial hemorrhage  [] Any prior history of intracranial hemorrhage  [] Symptoms suggestive of subarachnoid hemorrhage (even if head CT normal)  [] Persistent hypertension (SBP>185 or DBP>110)  [] Glucose < 50 or > 400.   [] Bleeding diathesis, including but not limited to:   -Platelets < 054,976   -Heparin within 48 hours with PTT > normal range   -Current or recent use of anticoagulants (dabagtran, rivaroxaban, or warfarin with     INR > 1.7)  [] Lumbar puncture in past 7 days  [] Arterial puncture at a noncompressible site in past 7 days  [] Major surgery in past 14 days  [] Gastrointestinal or urinary tract hemorrhage in past 21 days  [] Myocardial infarction in past 3 months  [] Stroke, intracranial surgery or serious head trauma in past 3 months    t-PA given with the following INCLUSION CRITERIA verified (only those checked):  [] Age 25 years or older  [] Clinical diagnosis of ischemic stroke causing measurable neurological deficit  [] Administration of t-PA can be initiated within 3 hours of onset of symptoms  [] A patient or family members who understand the potential risks and benefits:   Of every 100 patients treated with tPA:   72 will have the same outcome   32 will have a better outcome   3 will have a worse outcome (with 1

## 2018-09-03 NOTE — PROGRESS NOTES
contacted for respiratory rate (RR) greater than 35 breaths per minute. Therapy will be held for heart rate (HR) greater than 140 beats per minute, pending direction from physician. 3. Bronchodilators will be administered via Metered Dose Inhaler (MDI) with spacer when the following criteria are met:  a. Alert and cooperative     b. HR < 140 bpm  c. RR < 30 bpm                d. Can demonstrate a 23 second inspiratory hold  4. Bronchodilators will be administered via Hand Held Nebulizer [facility]) to patients when ANY of the following criteria are met  a. Incognizant or uncooperative          b. Patients treated with HHN at Home        c. Unable to demonstrate proper use of MDI with spacer     d. RR > 30 bpm   5. Bronchodilators will be delivered via Metered Dose Inhaler (MDI), HHN, Aerogen to intubated patients on mechanical ventilation. 6. Inhalation medication orders will be delivered and/or substituted as outlined below. Aerosolized Medications Ordering and Administration Guidelines:    1. All Medications will be ordered by a physician, and their frequency and/or modality will be adjusted as defined by the patients Respiratory Severity Index (RSI) score. 2. If the patient does not have documented COPD, consider discontinuing anticholinergics when RSI is less than 9.  3. If the bronchospasm worsens (increased RSI), then the bronchodilator frequency can be increased to a maximum of every 4 hours. If greater than every 4 hours is required, the physician will be contacted. 4. If the bronchospasm improves, the frequency of the bronchodilator can be decreased, based on the patient's RSI, but not less than home treatment regimen frequency. 5. Bronchodilator(s) will be discontinued if patient has a RSI less than 9 and has received no scheduled or as needed treatment for 72  Hrs. Patients Ordered on a Mucolytic Agent:    1. Must always be administered with a bronchodilator.     2. Discontinue if patient experiences worsened bronchospasm, or secretions have lessened to the point that the patient is able to clear them with a cough. Anti-inflammatory and Combination Medications:    1. If the patient lacks prior history of lung disease, is not using inhaled anti-inflammatory medication at home, and lacks wheezing by examination or by history for at least 24 hours, contact physician for possible discontinuation.

## 2018-09-03 NOTE — PROGRESS NOTES
10  Oxygen Therapy  SpO2: 98 %  O2 Device: None (Room air)  Social/Functional History  Social/Functional History  Lives With:  Sonia Sands (roomate there 24/7))  Type of Home: House  Home Layout: One level  Home Access: Stairs to enter without rails  Entrance Stairs - Number of Steps: 1  Bathroom Shower/Tub: Walk-in shower, Shower chair without back  Bathroom Toilet: Handicap height  Bathroom Equipment: Grab bars in shower, Grab bars around toilet  Bathroom Accessibility: Accessible  Home Equipment: Rolling walker, [address], Lift chair  ADL Assistance: Independent  Homemaking Assistance: Needs assistance (roomate does the cleaning and cooking )  Ambulation Assistance: Independent (with RW)  Transfer Assistance: Independent       Objective        Orientation  Overall Orientation Status: Within Functional Limits     Balance  Sitting Balance: Supervision (EOB)  Standing Balance: Minimal assistance (RW)  Standing Balance  Time: x30 sec  Activity: t/f, mobility  Sit to stand: Minimal assistance (RW)  Stand to sit: Minimal assistance (RW)  Comment: Min A for steady assist, pt with anterior lean  Functional Mobility  Functional - Mobility Device: Rolling Walker  Activity: Other  Assist Level: Minimal assistance  Functional Mobility Comments: Min A with RW d/t anterior lean, decreased safety awareness noted requiring VCs   ADL  LE Dressing: Contact guard assistance (donning B socks EOB, CGA for safety leaning forward)  Toileting: Dependent/Total (sanchez)  Tone RUE  RUE Tone: Normotonic  Tone LUE  LUE Tone: Normotonic  Coordination  Movements Are Fluid And Coordinated: Yes     Bed mobility  Supine to Sit: Unable to assess  Sit to Supine:  Moderate assistance (for trunk support, increased time required)  Scooting: Stand by assistance  Comment: HOB elevated, use of bed rails   Transfers  Sit to stand: Minimal assistance (RW)  Stand to sit: Minimal assistance (RW)     Cognition  Overall Cognitive Status: Exceptions  Arousal/Alertness: Delayed responses to stimuli  Following Commands: Follows one step commands with increased time  Attention Span: Attends with cues to redirect  Memory: Appears intact  Safety Judgement: Decreased awareness of need for assistance  Problem Solving: Assistance required to identify errors made  Insights: Decreased awareness of deficits  Initiation: Requires cues for some  Sequencing: Requires cues for some  Perception  Overall Perceptual Status: Impaired  Perseveration: Perseverates during conversation (pt continually asking for food despite therpapist education on NPO diet)     Sensation  Overall Sensation Status: WFL        LUE AROM (degrees)  LUE AROM : WFL  RUE AROM (degrees)  RUE AROM : WFL  LUE Strength  Gross LUE Strength: WFL  L Hand Grasp: 4+/5  L Hand Release: 4+/5  RUE Strength  Gross RUE Strength: WFL  R Hand Grasp: 4+/5  R Hand Release: 4+/5    Assessment   Performance deficits / Impairments: Decreased functional mobility ; Decreased ADL status; Decreased safe awareness;Decreased cognition;Decreased endurance;Decreased balance  Assessment: OT eval complete. Pt presents with the above deficits impacting independence with ADLs and functional t/fs. Recommend continued skilled OT to increase independence and safety prior to return home. Recommend SNF DC, pt agreeable. Prognosis: Good  Decision Making: High Complexity  Patient Education: Role of OT, safe t/fs, bed mobility, ADLs, DC recommendations  REQUIRES OT FOLLOW UP: Yes  Activity Tolerance  Activity Tolerance: Patient Tolerated treatment well;Patient limited by fatigue  Safety Devices  Safety Devices in place: Yes  Type of devices: All fall risk precautions in place;Call light within reach; Chair alarm in place;Gait belt;Patient at risk for falls;Nurse notified; Left in chair         Plan   Plan  Times per week: 3-5x/wk    G-Code  OT G-codes  Functional Assessment Tool Used: [facility]  Score: 18  Functional Limitation: Self

## 2018-09-04 ENCOUNTER — APPOINTMENT (OUTPATIENT)
Dept: CT IMAGING | Age: 73
DRG: 037 | End: 2018-09-04
Payer: MEDICARE

## 2018-09-04 ENCOUNTER — APPOINTMENT (OUTPATIENT)
Dept: MRI IMAGING | Age: 73
DRG: 037 | End: 2018-09-04
Payer: MEDICARE

## 2018-09-04 PROBLEM — E44.0 MODERATE MALNUTRITION (HCC): Chronic | Status: ACTIVE | Noted: 2018-09-04

## 2018-09-04 LAB
CHOLESTEROL, TOTAL: 147 MG/DL (ref 0–199)
HDLC SERPL-MCNC: 48 MG/DL (ref 40–60)
LDL CHOLESTEROL CALCULATED: 81 MG/DL
LV EF: 58 %
LVEF MODALITY: NORMAL
TRIGL SERPL-MCNC: 91 MG/DL (ref 0–150)
VLDLC SERPL CALC-MCNC: 18 MG/DL

## 2018-09-04 PROCEDURE — 70551 MRI BRAIN STEM W/O DYE: CPT

## 2018-09-04 PROCEDURE — 1200000000 HC SEMI PRIVATE

## 2018-09-04 PROCEDURE — 70498 CT ANGIOGRAPHY NECK: CPT

## 2018-09-04 PROCEDURE — 2580000003 HC RX 258: Performed by: NURSE PRACTITIONER

## 2018-09-04 PROCEDURE — 6360000004 HC RX CONTRAST MEDICATION: Performed by: PSYCHIATRY & NEUROLOGY

## 2018-09-04 PROCEDURE — 70496 CT ANGIOGRAPHY HEAD: CPT

## 2018-09-04 PROCEDURE — 93880 EXTRACRANIAL BILAT STUDY: CPT

## 2018-09-04 PROCEDURE — 80061 LIPID PANEL: CPT

## 2018-09-04 PROCEDURE — 6360000002 HC RX W HCPCS: Performed by: NURSE PRACTITIONER

## 2018-09-04 PROCEDURE — 36415 COLL VENOUS BLD VENIPUNCTURE: CPT

## 2018-09-04 PROCEDURE — 70450 CT HEAD/BRAIN W/O DYE: CPT

## 2018-09-04 PROCEDURE — 99233 SBSQ HOSP IP/OBS HIGH 50: CPT | Performed by: PSYCHIATRY & NEUROLOGY

## 2018-09-04 PROCEDURE — 6370000000 HC RX 637 (ALT 250 FOR IP): Performed by: NURSE PRACTITIONER

## 2018-09-04 PROCEDURE — 93306 TTE W/DOPPLER COMPLETE: CPT | Performed by: INTERNAL MEDICINE

## 2018-09-04 RX ADMIN — VITAMIN D, TAB 1000IU (100/BT) 2000 UNITS: 25 TAB at 07:44

## 2018-09-04 RX ADMIN — OXYBUTYNIN CHLORIDE 5 MG: 5 TABLET ORAL at 20:11

## 2018-09-04 RX ADMIN — DOCUSATE SODIUM 100 MG: 100 CAPSULE, LIQUID FILLED ORAL at 07:44

## 2018-09-04 RX ADMIN — DOCUSATE SODIUM 100 MG: 100 CAPSULE, LIQUID FILLED ORAL at 20:11

## 2018-09-04 RX ADMIN — IOPAMIDOL 80 ML: 755 INJECTION, SOLUTION INTRAVENOUS at 18:13

## 2018-09-04 RX ADMIN — LOSARTAN POTASSIUM 50 MG: 25 TABLET, FILM COATED ORAL at 07:44

## 2018-09-04 RX ADMIN — FAMOTIDINE 20 MG: 20 TABLET, FILM COATED ORAL at 20:11

## 2018-09-04 RX ADMIN — FAMOTIDINE 20 MG: 20 TABLET, FILM COATED ORAL at 07:43

## 2018-09-04 RX ADMIN — SODIUM CHLORIDE, PRESERVATIVE FREE 10 ML: 5 INJECTION INTRAVENOUS at 20:11

## 2018-09-04 RX ADMIN — OXYBUTYNIN CHLORIDE 5 MG: 5 TABLET ORAL at 07:44

## 2018-09-04 RX ADMIN — ASPIRIN 81 MG 81 MG: 81 TABLET ORAL at 07:43

## 2018-09-04 RX ADMIN — ATORVASTATIN CALCIUM 20 MG: 10 TABLET, FILM COATED ORAL at 20:11

## 2018-09-04 RX ADMIN — POLYETHYLENE GLYCOL 3350 17 G: 17 POWDER, FOR SOLUTION ORAL at 07:44

## 2018-09-04 RX ADMIN — SODIUM CHLORIDE, PRESERVATIVE FREE 10 ML: 5 INJECTION INTRAVENOUS at 07:44

## 2018-09-04 RX ADMIN — Medication 1 TABLET: at 07:44

## 2018-09-04 RX ADMIN — CITALOPRAM HYDROBROMIDE 20 MG: 20 TABLET ORAL at 07:43

## 2018-09-04 RX ADMIN — CLOPIDOGREL BISULFATE 75 MG: 75 TABLET ORAL at 07:43

## 2018-09-04 RX ADMIN — GABAPENTIN 300 MG: 300 CAPSULE ORAL at 18:26

## 2018-09-04 RX ADMIN — CYANOCOBALAMIN TAB 500 MCG 1000 MCG: 500 TAB at 07:43

## 2018-09-04 RX ADMIN — ENOXAPARIN SODIUM 40 MG: 40 INJECTION SUBCUTANEOUS at 07:45

## 2018-09-04 ASSESSMENT — PAIN SCALES - GENERAL
PAINLEVEL_OUTOF10: 0

## 2018-09-04 NOTE — PLAN OF CARE
Problem: ACTIVITY INTOLERANCE/IMPAIRED MOBILITY  Goal: Mobility/activity is maintained at optimum level for patient  NIH Stroke Scale  Interval: Baseline  Level of Consciousness (1a. ): Alert  LOC Questions (1b. ):  Incorrect  LOC Commands (1c. ): Obeys both correctly  Best Gaze (2. ): Normal  Visual (3. ): No visual loss  Facial Palsy (4. ): Normal  Motor Arm, Left (5a. ): No drift  Motor Arm, Right (5b. ): No drift  Motor Leg, Left (6a. ): No drift  Motor Leg, Right (6b. ): No drift  Limb Ataxia (7. ): Absent  Sensory (8. ): Normal  Best Language (9. ): No aphasia  Dysarthria (10. ): Normal  Extinction and Inattention (11): No neglect  Total: 2

## 2018-09-04 NOTE — PROGRESS NOTES
Attempted x1 to place IV in left forearm. Pt agitated, initial uncooperative with requests; S/O at bedside; assisted in calming pt verbally to allow this nurse to attempt IV placement. IV catheter inserted with positive blood return; unable to advance catheter, did not attempt flush. Will consult provider regarding need for access.

## 2018-09-04 NOTE — PROGRESS NOTES
Evelyn Roberts  Neurology Follow-up  [facility] Neurology    Date of Service: 9/4/2018    Subjective:   CC: Follow up today regarding: acute stroke    Events noted. Chart and lab reviewed. The patient had her MRI of the brain which showed acute left ischemic hemispheric CVA. Continues to be slightly weaker on the right side with occasional confusion. She denies any headache, neck or back pain, dysphagia or dysarthria. She denies any numbness or tingling. Other review of system was unremarkable. ROS : A 10-12 system review of constitutional, cardiovascular, respiratory, musculoskeletal, endocrine, skin, SHEENT, genitourinary, psychiatric and neurologic systems was obtained and updated today and is unremarkable except as mentioned  in my interval history.        Past Medical History:   Diagnosis Date    Acute cerebrovascular accident (CVA) ([address] 9/3/2018    Alcohol abuse 5/22/2018    Asthma     COPD, severe ([address] 5/26/2015    Coronary artery disease involving native coronary artery of native heart without angina pectoris 8/4/2017    Essential hypertension     GERD (gastroesophageal reflux disease)     Hyperlipidemia     Hypertension     Injury of esophagus     inflamed    Insomnia     Kidney stone [id number]    Malignant neoplasm of bronchus of left upper lobe ([facility] 8/13/2018    Mass of upper lobe of left lung 08/2017    Neuropathy 11/13/2014    Osteoarthritis     Prolonged emergence from general anesthesia     Urinary incontinence      Current Facility-Administered Medications   Medication Dose Route Frequency Provider Last Rate Last Dose    albuterol sulfate  (90 Base) MCG/ACT inhaler 2 puff  2 puff Inhalation Q6H PRN SORAIDA Garcia CNP        aspirin chewable tablet 81 mg  81 mg Oral Daily SORAIDA Garcia CNP   81 mg at 09/04/18 7211    vitamin D (CHOLECALCIFEROL) tablet 2,000 Units  2,000 Units Oral Daily SORAIDA Garcia CNP 2,000 Units at 09/04/18 0744    citalopram (CELEXA) tablet 20 mg  20 mg Oral Daily Danna Nap, APRN - CNP   20 mg at 09/04/18 0743    gabapentin (NEURONTIN) capsule 300 mg  300 mg Oral QPM Danna Nap, APRN - CNP   300 mg at 09/03/18 1725    losartan (COZAAR) tablet 50 mg  50 mg Oral Daily Danna Nap, APRN - CNP   50 mg at 09/04/18 5540    therapeutic multivitamin-minerals 1 tablet  1 tablet Oral Daily Danna Nap, APRN - CNP   1 tablet at 09/04/18 6618    vitamin B-12 (CYANOCOBALAMIN) tablet 1,000 mcg  1,000 mcg Oral Daily Danna Nap, APRN - CNP   1,000 mcg at 09/04/18 0587    sodium chloride flush 0.9 % injection 10 mL  10 mL Intravenous 2 times per day Danna Nap, APRN - CNP   10 mL at 09/04/18 0744    sodium chloride flush 0.9 % injection 10 mL  10 mL Intravenous PRN Danna Nap, APRN - CNP        magnesium hydroxide (MILK OF MAGNESIA) 400 MG/5ML suspension 30 mL  30 mL Oral Daily PRN Danna Nap, APRN - CNP        ondansetron [facility]) injection 4 mg  4 mg Intravenous Q6H PRN Danna Nap, APRN - CNP        enoxaparin (LOVENOX) injection 40 mg  40 mg Subcutaneous Daily Danna Nap, APRN - CNP   40 mg at 09/04/18 0745    clopidogrel (PLAVIX) tablet 75 mg  75 mg Oral Daily Danna Nap, APRN - CNP   75 mg at 09/04/18 0743    labetalol (NORMODYNE;TRANDATE) injection 10 mg  10 mg Intravenous Q10 Min PRN Danna Nap, APRN - CNP        atorvastatin (LIPITOR) tablet 20 mg  20 mg Oral Nightly Danna Nap, APRN - CNP   20 mg at 09/03/18 2035    oxybutynin (DITROPAN) tablet 5 mg  5 mg Oral BID Danna Nap, APRN - CNP   5 mg at 09/04/18 0744    famotidine (PEPCID) tablet 20 mg  20 mg Oral BID Danna Nap, APRN - CNP   20 mg at 09/04/18 8902    docusate sodium (COLACE) capsule 100 mg  100 mg Oral BID SORAIDA Dickens CNP   100 mg at 09/04/18 2306 reactive to light  III,IV,VI: Extra Ocular Movements are intact. No nystagmus  V: Facial sensation is intact to pin prick and light touch  VII: Facial strength and movements: intact and symmetric smile,cheek puffing and eyebrow elevation  VIII: Hearing: Intact to finger rub bilaterally  IX: Palate elevation is symmetric  XI: Shoulder shrug is intact  XII: Tongue movements are normal  Musculoskeletal: 5/5 in left side and 4/5 weakness in the right side  Reflexes: Bilateral biceps 2/4, triceps 2/4, brachial radialis 2/4, knee 2/4 and ankle 2/4. Planters: flexor bilaterally. Coordination: no pronator drift, no dysmetria. Finger nose finger testing within normal limits. Sensation: normal to all modalities. Gait/Posture: NT due to weakness and confusion. Data:  LABS:   Lab Results   Component Value Date     09/02/2018    K 3.8 09/02/2018     09/02/2018    CO2 26 09/02/2018    BUN 15 09/02/2018    CREATININE 0.9 09/02/2018    GFRAA >60 09/02/2018    GFRAA >60 03/05/2013    LABGLOM >60 09/02/2018    GLUCOSE 110 09/02/2018    PHOS 3.1 08/15/2018    MG 2.00 08/15/2018    CALCIUM 9.5 09/02/2018     Lab Results   Component Value Date    WBC 9.9 09/02/2018    RBC 4.23 09/02/2018    HGB 12.6 09/02/2018    HCT 37.0 09/02/2018    MCV 87.4 09/02/2018    RDW 12.4 09/02/2018     09/02/2018     Lab Results   Component Value Date    INR 1.07 09/02/2018    PROTIME 12.2 09/02/2018     Neuroimaging and/or  labs reviewed by me and discussed results with the patient and family. Impression:  New ischemic left hemispheric CVA, possible embolic versus thromboembolic. Acute aphasia and encephalopathy.   Hypertension  Hyperlipidemia  Depression  CAD  Lung CA  Smoker      Recommendation  CTA of the head and neck to evaluate proximal vessels  Echocardiogram  Telemetry  PT and OT  Speech evaluation  DT precautions  Consider hypercoagulable profile  Continue home blood pressure medications  Aspiration

## 2018-09-04 NOTE — PROGRESS NOTES
Nutrition Assessment    Type and Reason for Visit: Initial, Positive Nutrition Screen    Nutrition Recommendations:   · Continue general diet  · Add Ensure TID  · Encourage po intakes  · Obtain actual weight if possible  · Monitor po intakes, nutrition adequacy, weights, pertinent labs, BMs    Malnutrition Assessment:  · Malnutrition Status: Meets the criteria for moderate malnutrition  · Context: Chronic illness  · Findings of the 6 clinical characteristics of malnutrition (Minimum of 2 out of 6 clinical characteristics is required to make the diagnosis of moderate or severe Protein Calorie Malnutrition based on AND/ASPEN Guidelines):  1. Energy Intake-Less than or equal to 75%, greater than or equal to 3 months    2. Weight Loss-Unable to assess,    3. Fat Loss-Moderate subcutaneous fat loss, Orbital, Triceps  4. Muscle Loss-Moderate muscle mass loss, Temples (temporalis muscle), Clavicles (pectoralis and deltoids), Calf (gastrocnemius), Interosseous (Arms)  5. Fluid Accumulation-No significant fluid accumulation,    6.  Strength-Not measured    Nutrition Diagnosis:   · Problem: Inadequate oral intake  · Etiology: related to Insufficient energy/nutrient consumption     Signs and symptoms:  as evidenced by Diet history of poor intake    Nutrition Assessment:  · Subjective Assessment: + screen for MST score of 2. Pt is a 66 yo female with Hx of COPD, etoh abuse, CAD, HTN, who was admitted with difficulty coming up with words. Currently on a general diet. Intakes % per EMR. Pt reports that her appetite is slowly getting better. PT endorses poor appetite PTA over the past year and states that she has lost weight. DANICA d/t states CBW. Pt states that she has been trying to drink Ensure at home, will order while inpatient. Ordered pt lunch and encouraged po intakes and smaller, more frequent meals. Pt had no questions.    · Nutrition-Focused Physical Findings: See malnutrition assessment  · Wound Type:

## 2018-09-04 NOTE — PROGRESS NOTES
Patient alert and oriented to self, situation; expressed frustration regarding MRI today, neuro assessment questions. Struggled with relaying birth date. Mildly agitated with doing stroke assessment asking why she has to repeatedly answer the same questions and go over the same pictures. Compliant with assessment, meds,currently resting in bed, S/O at bedside.

## 2018-09-04 NOTE — CARE COORDINATION
Malia with [facility] in to see patient this am.  Patient states she wants to go home with [facility]. Placed call to [facility] with [facility] to notify of discharge plan and PT/OT rec's of S3 level of care. Facesheet faxed to [facility].

## 2018-09-05 PROCEDURE — 1200000000 HC SEMI PRIVATE

## 2018-09-05 PROCEDURE — 2580000003 HC RX 258: Performed by: NURSE PRACTITIONER

## 2018-09-05 PROCEDURE — 97110 THERAPEUTIC EXERCISES: CPT

## 2018-09-05 PROCEDURE — 99233 SBSQ HOSP IP/OBS HIGH 50: CPT | Performed by: PSYCHIATRY & NEUROLOGY

## 2018-09-05 PROCEDURE — 6370000000 HC RX 637 (ALT 250 FOR IP): Performed by: NURSE PRACTITIONER

## 2018-09-05 PROCEDURE — 97116 GAIT TRAINING THERAPY: CPT

## 2018-09-05 RX ADMIN — SODIUM CHLORIDE, PRESERVATIVE FREE 10 ML: 5 INJECTION INTRAVENOUS at 08:39

## 2018-09-05 RX ADMIN — ATORVASTATIN CALCIUM 20 MG: 10 TABLET, FILM COATED ORAL at 21:19

## 2018-09-05 RX ADMIN — CLOPIDOGREL BISULFATE 75 MG: 75 TABLET ORAL at 08:33

## 2018-09-05 RX ADMIN — MAGNESIUM HYDROXIDE 30 ML: 400 SUSPENSION ORAL at 08:41

## 2018-09-05 RX ADMIN — FAMOTIDINE 20 MG: 20 TABLET, FILM COATED ORAL at 08:34

## 2018-09-05 RX ADMIN — POLYETHYLENE GLYCOL 3350 17 G: 17 POWDER, FOR SOLUTION ORAL at 08:36

## 2018-09-05 RX ADMIN — GABAPENTIN 300 MG: 300 CAPSULE ORAL at 18:52

## 2018-09-05 RX ADMIN — SODIUM CHLORIDE, PRESERVATIVE FREE 10 ML: 5 INJECTION INTRAVENOUS at 21:20

## 2018-09-05 RX ADMIN — Medication 1 TABLET: at 08:36

## 2018-09-05 RX ADMIN — VITAMIN D, TAB 1000IU (100/BT) 2000 UNITS: 25 TAB at 08:35

## 2018-09-05 RX ADMIN — FAMOTIDINE 20 MG: 20 TABLET, FILM COATED ORAL at 21:19

## 2018-09-05 RX ADMIN — OXYBUTYNIN CHLORIDE 5 MG: 5 TABLET ORAL at 21:19

## 2018-09-05 RX ADMIN — DOCUSATE SODIUM 100 MG: 100 CAPSULE, LIQUID FILLED ORAL at 08:35

## 2018-09-05 RX ADMIN — DOCUSATE SODIUM 100 MG: 100 CAPSULE, LIQUID FILLED ORAL at 21:19

## 2018-09-05 RX ADMIN — CYANOCOBALAMIN TAB 500 MCG 1000 MCG: 500 TAB at 08:34

## 2018-09-05 RX ADMIN — OXYBUTYNIN CHLORIDE 5 MG: 5 TABLET ORAL at 08:35

## 2018-09-05 RX ADMIN — LOSARTAN POTASSIUM 50 MG: 25 TABLET, FILM COATED ORAL at 08:35

## 2018-09-05 RX ADMIN — CITALOPRAM HYDROBROMIDE 20 MG: 20 TABLET ORAL at 08:35

## 2018-09-05 ASSESSMENT — PAIN SCALES - GENERAL
PAINLEVEL_OUTOF10: 0

## 2018-09-05 NOTE — PROGRESS NOTES
Physical Therapy  Facility/Department: [facility] B3 - MED SURG  Daily Treatment Note  NAME: Nolvia Rosales  :   MRN: [id number]    Date of Service: 2018    Discharge Recommendations:  S Level 3, Home with Home health PT, 24 hour supervision or assist        Patient Diagnosis(es): The encounter diagnosis was Acute right-sided weakness. has a past medical history of Acute cerebrovascular accident (CVA) ([facility] Alcohol abuse; Asthma; COPD, severe ([address] Coronary artery disease involving native coronary artery of native heart without angina pectoris; Essential hypertension; GERD (gastroesophageal reflux disease); Hyperlipidemia; Hypertension; Injury of esophagus; Insomnia; Kidney stone; Malignant neoplasm of bronchus of left upper lobe ([facility] Mass of upper lobe of left lung; Neuropathy; Osteoarthritis; Prolonged emergence from general anesthesia; and Urinary incontinence. has a past surgical history that includes Lithotripsy; Colonoscopy (2013); Hysterectomy, total abdominal; bronchoscopy (2017); Colonoscopy (2010); Thoracoscopy (Left, 2018); and pr office/outpt visit,procedure only (Left, 2018). Restrictions  Restrictions/Precautions  Restrictions/Precautions: Fall Risk, General Precautions (high)  Position Activity Restriction  Other position/activity restrictions: up with assist, tele, NPO however continually asking for food  Subjective   General  Chart Reviewed: Yes  Response To Previous Treatment: Patient with no complaints from previous session. Family / Caregiver Present: Yes ()  Referring Practitioner: SORAIDA Paige CNP  Subjective  Subjective: Pt agrees to PT. General Comment  Comments: ALESHA tao stating pt has been asking to walk.   Pain Screening  Patient Currently in Pain: Denies  Vital Signs  Patient Currently in Pain: Denies       Orientation  Orientation  Overall Orientation Status: Within Functional Limits  Objective   Bed Training, Equipment Evaluation, Education, & procurement, Gait Training, Transfer Training, Home Exercise Program, Neuromuscular Re-education  Safety Devices  Type of devices:  All fall risk precautions in place, Left in chair, Call light within reach, Chair alarm in place, Gait belt, Patient at risk for falls, Nurse notified     Therapy Time   Individual Concurrent Group Co-treatment   Time In [address]         Time Out 1643         Minutes 25         Timed Code Treatment Minutes: [address] [facility]

## 2018-09-05 NOTE — PROGRESS NOTES
BLOODU LARGE 09/02/2018    SPECGRAV >=1.030 09/02/2018    GLUCOSEU Negative 09/02/2018       Radiology:  CT HEAD WO CONTRAST   Final Result   No acute intracranial abnormality. MRI BRAIN WO CONTRAST   Final Result   1. Multiple scattered punctate acute infarcts are seen throughout the left   cerebral hemisphere. These may be embolic in nature. 2. There is no significant mass effect or midline shift. 3. Mild-to-moderate global parenchymal volume loss with chronic microvascular   ischemic change. These results were sent to the [facility] [id number] ([address] [place] [address]) on 9/4/2018   at 2:38 pm to be communicated to the referring/covering health care   provider/office. VL DUP CAROTID BILATERAL         XR CHEST STANDARD (2 VW)   Final Result   No acute disease. CT HEAD WO CONTRAST   Final Result   No acute intracranial abnormality. Stable left caudate lobe lacunar stroke. White matter hypoattenuation described is typical of microvascular ischemic   disease or as sequela of dysmyelinating/demyelinating processes. CTA NECK W CONTRAST    (Results Pending)   CTA HEAD W CONTRAST    (Results Pending)           Assessment/Plan:    Active Hospital Problems    Diagnosis Date Noted    Moderate malnutrition ([address] [E44.0] 09/04/2018    Acute cerebrovascular accident (CVA) ([address] [I63.9] 09/03/2018    Acute right-sided weakness [M62.89]     HTN (hypertension), benign [I10]     S/P thoracotomy [Z98.890]     Malignant neoplasm of bronchus of left upper lobe ([address] [C34.12] 08/13/2018    Coronary artery disease involving native coronary artery of native heart without angina pectoris [I25.10] 08/04/2017    COPD, severe ([address] [J44.9] 05/26/2015    Hyperlipidemia, familial, high LDL [E78.4]      Acute left hemispheric cerebrovascular accident - embolic vs thromboembolic. CTA, ECHO reviewed. PT/OT. Neurology following. Continue ASA, statin, plavix.      Lung cancer - wedge procedure with

## 2018-09-05 NOTE — ED PROVIDER NOTES
M. [address] - video assisted w/wedge resection of LATANYA, MSLND         CURRENT MEDICATIONS       Discharge Medication List as of 8/8/2018  3:17 PM      CONTINUE these medications which have NOT CHANGED    Details   citalopram (CELEXA) 20 MG tablet Take 1 tablet by mouth daily, Disp-90 tablet, R-3Normal      oxybutynin (DITROPAN) 5 MG tablet Take 1 tablet by mouth 2 times daily, Disp-180 tablet, R-3Normal      aspirin 81 MG tablet Take 81 mg by mouth dailyHistorical Med      gabapentin (NEURONTIN) 300 MG capsule Take 300 mg by mouth every evening. Sheri Copper Historical Med      atorvastatin (LIPITOR) 20 MG tablet TAKE ONE TABLET BY MOUTH DAILY, Disp-90 tablet, R-0Normal      Cholecalciferol (VITAMIN D3) 2000 UNITS CAPS Take  by mouth.      vitamin B-12 (CYANOCOBALAMIN) 1000 MCG tablet Take 1,000 mcg by mouth daily. therapeutic multivitamin-minerals (THERAGRAN-M) tablet Take 1 tablet by mouth daily. Review of Systems: See AZEB note    PHYSICAL EXAM    VITAL SIGNS: BP (!) 160/63   Pulse 72   Temp 97.6 °F (36.4 °C)   Resp 16   Wt 61.2 kg (135 lb)   SpO2 98%   BMI 21.46 kg/m²    Constitutional:  Well developed, Well nourished, No acute distress, Non-toxic appearance. HENT:  Normocephalic, Atraumatic, Bilateral external ears normal, Oropharynx moist, No oral exudates, Nose normal.   Neck: Normal range of motion, No tenderness, Supple, No stridor. Eyes:   Conjunctiva normal, No discharge. LABS  Results for orders placed or performed during the hospital encounter of 08/08/18   Culture blood #1   Result Value Ref Range    Blood Culture, Routine No growth after 5 days of incubation. Urine Culture   Result Value Ref Range    Urine Culture, Routine       >50,000 CFU/ml mixed skin/urogenital loretta.  No further workup   CBC auto differential   Result Value Ref Range    WBC 11.1 (H) 4.0 - 11.0 K/uL    RBC 4.14 4.00 - 5.20 M/uL    Hemoglobin 12.4 12.0 - 16.0 g/dL    Hematocrit 36.7 36.0 - 48.0 %    MCV 88.7 80.0 - 100.0 fL    MCH 29.9 26.0 - 34.0 pg    MCHC 33.8 31.0 - 36.0 g/dL    RDW 12.1 (L) 12.4 - 15.4 %    Platelets 363 996 - 131 K/uL    MPV 8.2 5.0 - 10.5 fL    Neutrophils % 77.3 %    Lymphocytes % 14.7 %    Monocytes % 5.4 %    Eosinophils % 1.9 %    Basophils % 0.7 %    Neutrophils # 8.6 (H) 1.7 - 7.7 K/uL    Lymphocytes # 1.6 1.0 - 5.1 K/uL    Monocytes # 0.6 0.0 - 1.3 K/uL    Eosinophils # 0.2 0.0 - 0.6 K/uL    Basophils # 0.1 0.0 - 0.2 K/uL   Comprehensive metabolic panel   Result Value Ref Range    Sodium 138 136 - 145 mmol/L    Potassium 4.3 3.5 - 5.1 mmol/L    Chloride 99 99 - 110 mmol/L    CO2 27 21 - 32 mmol/L    Anion Gap 12 3 - 16    Glucose 158 (H) 70 - 99 mg/dL    BUN 16 7 - 20 mg/dL    CREATININE 1.0 0.6 - 1.2 mg/dL    GFR Non-African American 54 (A) >60    GFR African American >60 >60    Calcium 10.1 8.3 - 10.6 mg/dL    Total Protein 7.0 6.4 - 8.2 g/dL    Alb 4.0 3.4 - 5.0 g/dL    Albumin/Globulin Ratio 1.3 1.1 - 2.2    Total Bilirubin 1.0 0.0 - 1.0 mg/dL    Alkaline Phosphatase 86 40 - 129 U/L    ALT 8 (L) 10 - 40 U/L    AST 15 15 - 37 U/L    Globulin 3.0 g/dL   Troponin   Result Value Ref Range    Troponin <0.01 <0.01 ng/mL   Lactic acid, plasma   Result Value Ref Range    Lactic Acid 1.6 0.4 - 2.0 mmol/L   Sample possible blood bank testing   Result Value Ref Range    Specimen Status TRI    Urinalysis Reflex to Culture   Result Value Ref Range    Color, UA Yellow Straw/Yellow    Clarity, UA Clear Clear    Glucose, Ur Negative Negative mg/dL    Bilirubin Urine Negative Negative    Ketones, Urine Negative Negative mg/dL    Specific Gravity, UA 1.015 1.005 - 1.030    Blood, Urine Negative Negative    pH, UA 7.0 5.0 - 8.0    Protein, UA Negative Negative mg/dL    Urobilinogen, Urine 1.0 <2.0 E.U./dL    Nitrite, Urine Negative Negative    Leukocyte Esterase, Urine TRACE (A) Negative    Microscopic Examination YES     Urine Reflex to Culture Yes     Urine Type Not Specified    Ammonia   Result Value Ref Range    Ammonia 25 11 - 51 umol/L   Ethanol   Result Value Ref Range    Ethanol Lvl None Detected mg/dL   Microscopic Urinalysis   Result Value Ref Range    WBC, UA 3-5 0 - 5 /HPF    RBC, UA 0-2 0 - 2 /HPF    Epi Cells 0-2 /HPF    Bacteria, UA 1+ (A) /HPF   EKG 12 Lead   Result Value Ref Range    Ventricular Rate 58 BPM    Atrial Rate 58 BPM    P-R Interval 188 ms    QRS Duration 84 ms    Q-T Interval 406 ms    QTc Calculation (Bazett) 398 ms    P Axis 84 degrees    R Axis 83 degrees    T Axis 84 degrees    Diagnosis       Sinus bradycardiaNonspecific T wave abnormalityAbnormal ECGWhen compared with ECG of 29-JUL-2018 16:01,QT has shortenedConfirmed by Emely Mcdaniel MD, Savanna Burdick ([id number]) on 8/8/2018 7:47:51 PM   Type and Screen   Result Value Ref Range    ABO/Rh O POS     Antibody Screen NEG        RADIOLOGY  CT HEAD WO CONTRAST   Final Result   No acute intracranial abnormality. Small remote infarcts and moderate cerebral white matter disease unchanged. XR CHEST STANDARD (2 VW)   Final Result   1. No significant change.                   (Please note that portions of this note were completed with a voice recognition program.  Efforts were made to edit the dictations but occasionally words are mis-transcribed.)    Claudean Rolling, MD (electronically signed)        Isaura Addison MD  09/05/18 3252

## 2018-09-06 ENCOUNTER — ANESTHESIA EVENT (OUTPATIENT)
Dept: OPERATING ROOM | Age: 73
DRG: 037 | End: 2018-09-06
Payer: MEDICARE

## 2018-09-06 ENCOUNTER — ANESTHESIA (OUTPATIENT)
Dept: OPERATING ROOM | Age: 73
DRG: 037 | End: 2018-09-06
Payer: MEDICARE

## 2018-09-06 VITALS — SYSTOLIC BLOOD PRESSURE: 105 MMHG | DIASTOLIC BLOOD PRESSURE: 70 MMHG | OXYGEN SATURATION: 100 % | TEMPERATURE: 96.8 F

## 2018-09-06 PROCEDURE — 2580000003 HC RX 258: Performed by: SURGERY

## 2018-09-06 PROCEDURE — 2709999900 HC NON-CHARGEABLE SUPPLY: Performed by: SURGERY

## 2018-09-06 PROCEDURE — 7100000000 HC PACU RECOVERY - FIRST 15 MIN: Performed by: SURGERY

## 2018-09-06 PROCEDURE — 3600000017 HC SURGERY HYBRID ADDL 15MIN: Performed by: SURGERY

## 2018-09-06 PROCEDURE — 6360000002 HC RX W HCPCS: Performed by: SURGERY

## 2018-09-06 PROCEDURE — 3600000007 HC SURGERY HYBRID BASE: Performed by: SURGERY

## 2018-09-06 PROCEDURE — 2720000010 HC SURG SUPPLY STERILE: Performed by: SURGERY

## 2018-09-06 PROCEDURE — 2580000003 HC RX 258: Performed by: NURSE ANESTHETIST, CERTIFIED REGISTERED

## 2018-09-06 PROCEDURE — 6370000000 HC RX 637 (ALT 250 FOR IP): Performed by: SURGERY

## 2018-09-06 PROCEDURE — 3700000001 HC ADD 15 MINUTES (ANESTHESIA): Performed by: SURGERY

## 2018-09-06 PROCEDURE — 2500000003 HC RX 250 WO HCPCS: Performed by: NURSE ANESTHETIST, CERTIFIED REGISTERED

## 2018-09-06 PROCEDURE — 99221 1ST HOSP IP/OBS SF/LOW 40: CPT | Performed by: SURGERY

## 2018-09-06 PROCEDURE — 2580000003 HC RX 258: Performed by: NURSE PRACTITIONER

## 2018-09-06 PROCEDURE — C1768 GRAFT, VASCULAR: HCPCS | Performed by: SURGERY

## 2018-09-06 PROCEDURE — 6360000002 HC RX W HCPCS: Performed by: NURSE ANESTHETIST, CERTIFIED REGISTERED

## 2018-09-06 PROCEDURE — 03CL0ZZ EXTIRPATION OF MATTER FROM LEFT INTERNAL CAROTID ARTERY, OPEN APPROACH: ICD-10-PCS | Performed by: SURGERY

## 2018-09-06 PROCEDURE — 99233 SBSQ HOSP IP/OBS HIGH 50: CPT | Performed by: PSYCHIATRY & NEUROLOGY

## 2018-09-06 PROCEDURE — 7100000001 HC PACU RECOVERY - ADDTL 15 MIN: Performed by: SURGERY

## 2018-09-06 PROCEDURE — 35301 RECHANNELING OF ARTERY: CPT | Performed by: SURGERY

## 2018-09-06 PROCEDURE — 2780000010 HC IMPLANT OTHER: Performed by: SURGERY

## 2018-09-06 PROCEDURE — 3700000000 HC ANESTHESIA ATTENDED CARE: Performed by: SURGERY

## 2018-09-06 PROCEDURE — 6370000000 HC RX 637 (ALT 250 FOR IP): Performed by: NURSE PRACTITIONER

## 2018-09-06 PROCEDURE — 6370000000 HC RX 637 (ALT 250 FOR IP): Performed by: FAMILY MEDICINE

## 2018-09-06 PROCEDURE — 2000000000 HC ICU R&B

## 2018-09-06 DEVICE — FLOSEAL HEMOSTATIC MATRIX, 5 ML
Type: IMPLANTABLE DEVICE | Site: NECK | Status: FUNCTIONAL
Brand: FLOSEAL

## 2018-09-06 DEVICE — XENOSURE BIOLOGIC PATCH, 0.8CM X 8CM, EIFU
Type: IMPLANTABLE DEVICE | Site: NECK | Status: FUNCTIONAL
Brand: XENOSURE BIOLOGIC PATCH

## 2018-09-06 RX ORDER — ONDANSETRON 2 MG/ML
INJECTION INTRAMUSCULAR; INTRAVENOUS PRN
Status: DISCONTINUED | OUTPATIENT
Start: 2018-09-06 | End: 2018-09-06 | Stop reason: SDUPTHER

## 2018-09-06 RX ORDER — HYDRALAZINE HYDROCHLORIDE 20 MG/ML
5 INJECTION INTRAMUSCULAR; INTRAVENOUS EVERY 10 MIN PRN
Status: DISCONTINUED | OUTPATIENT
Start: 2018-09-06 | End: 2018-09-06 | Stop reason: HOSPADM

## 2018-09-06 RX ORDER — CLONIDINE HYDROCHLORIDE 0.1 MG/1
0.1 TABLET ORAL
Status: DISCONTINUED | OUTPATIENT
Start: 2018-09-06 | End: 2018-09-10 | Stop reason: HOSPADM

## 2018-09-06 RX ORDER — SODIUM CHLORIDE, SODIUM LACTATE, POTASSIUM CHLORIDE, CALCIUM CHLORIDE 600; 310; 30; 20 MG/100ML; MG/100ML; MG/100ML; MG/100ML
INJECTION, SOLUTION INTRAVENOUS CONTINUOUS PRN
Status: DISCONTINUED | OUTPATIENT
Start: 2018-09-06 | End: 2018-09-06 | Stop reason: SDUPTHER

## 2018-09-06 RX ORDER — NITROGLYCERIN 20 MG/100ML
5 INJECTION INTRAVENOUS CONTINUOUS PRN
Status: DISCONTINUED | OUTPATIENT
Start: 2018-09-06 | End: 2018-09-10 | Stop reason: HOSPADM

## 2018-09-06 RX ORDER — PROTAMINE SULFATE 10 MG/ML
INJECTION, SOLUTION INTRAVENOUS PRN
Status: DISCONTINUED | OUTPATIENT
Start: 2018-09-06 | End: 2018-09-06 | Stop reason: SDUPTHER

## 2018-09-06 RX ORDER — HYDROMORPHONE HCL 110MG/55ML
0.25 PATIENT CONTROLLED ANALGESIA SYRINGE INTRAVENOUS EVERY 5 MIN PRN
Status: DISCONTINUED | OUTPATIENT
Start: 2018-09-06 | End: 2018-09-06 | Stop reason: HOSPADM

## 2018-09-06 RX ORDER — ROCURONIUM BROMIDE 10 MG/ML
INJECTION, SOLUTION INTRAVENOUS PRN
Status: DISCONTINUED | OUTPATIENT
Start: 2018-09-06 | End: 2018-09-06 | Stop reason: SDUPTHER

## 2018-09-06 RX ORDER — ACETAMINOPHEN 325 MG/1
650 TABLET ORAL EVERY 4 HOURS PRN
Status: DISCONTINUED | OUTPATIENT
Start: 2018-09-06 | End: 2018-09-09

## 2018-09-06 RX ORDER — OXYCODONE HYDROCHLORIDE AND ACETAMINOPHEN 5; 325 MG/1; MG/1
1 TABLET ORAL PRN
Status: DISCONTINUED | OUTPATIENT
Start: 2018-09-06 | End: 2018-09-06 | Stop reason: HOSPADM

## 2018-09-06 RX ORDER — MORPHINE SULFATE 4 MG/ML
4 INJECTION, SOLUTION INTRAMUSCULAR; INTRAVENOUS
Status: DISCONTINUED | OUTPATIENT
Start: 2018-09-06 | End: 2018-09-06

## 2018-09-06 RX ORDER — HYDROCODONE BITARTRATE AND ACETAMINOPHEN 5; 325 MG/1; MG/1
2 TABLET ORAL EVERY 4 HOURS PRN
Status: DISCONTINUED | OUTPATIENT
Start: 2018-09-06 | End: 2018-09-10 | Stop reason: HOSPADM

## 2018-09-06 RX ORDER — CLOPIDOGREL BISULFATE 75 MG/1
75 TABLET ORAL DAILY
Status: DISCONTINUED | OUTPATIENT
Start: 2018-09-07 | End: 2018-09-10 | Stop reason: HOSPADM

## 2018-09-06 RX ORDER — HEPARIN SODIUM 10000 [USP'U]/ML
INJECTION, SOLUTION INTRAVENOUS; SUBCUTANEOUS PRN
Status: DISCONTINUED | OUTPATIENT
Start: 2018-09-06 | End: 2018-09-06 | Stop reason: SDUPTHER

## 2018-09-06 RX ORDER — MORPHINE SULFATE 2 MG/ML
2 INJECTION, SOLUTION INTRAMUSCULAR; INTRAVENOUS
Status: DISCONTINUED | OUTPATIENT
Start: 2018-09-06 | End: 2018-09-06

## 2018-09-06 RX ORDER — LABETALOL HYDROCHLORIDE 5 MG/ML
5 INJECTION, SOLUTION INTRAVENOUS EVERY 10 MIN PRN
Status: DISCONTINUED | OUTPATIENT
Start: 2018-09-06 | End: 2018-09-06 | Stop reason: HOSPADM

## 2018-09-06 RX ORDER — HYDROMORPHONE HCL 110MG/55ML
0.5 PATIENT CONTROLLED ANALGESIA SYRINGE INTRAVENOUS EVERY 5 MIN PRN
Status: DISCONTINUED | OUTPATIENT
Start: 2018-09-06 | End: 2018-09-06 | Stop reason: HOSPADM

## 2018-09-06 RX ORDER — FAMOTIDINE 20 MG/1
20 TABLET, FILM COATED ORAL DAILY
Status: DISCONTINUED | OUTPATIENT
Start: 2018-09-07 | End: 2018-09-07

## 2018-09-06 RX ORDER — LIDOCAINE HYDROCHLORIDE 20 MG/ML
INJECTION, SOLUTION INFILTRATION; PERINEURAL PRN
Status: DISCONTINUED | OUTPATIENT
Start: 2018-09-06 | End: 2018-09-06 | Stop reason: SDUPTHER

## 2018-09-06 RX ORDER — NITROGLYCERIN 20 MG/100ML
INJECTION INTRAVENOUS
Status: DISPENSED
Start: 2018-09-06 | End: 2018-09-06

## 2018-09-06 RX ORDER — MEPERIDINE HYDROCHLORIDE 50 MG/ML
12.5 INJECTION INTRAMUSCULAR; INTRAVENOUS; SUBCUTANEOUS EVERY 5 MIN PRN
Status: DISCONTINUED | OUTPATIENT
Start: 2018-09-06 | End: 2018-09-06 | Stop reason: HOSPADM

## 2018-09-06 RX ORDER — SODIUM CHLORIDE 9 MG/ML
INJECTION, SOLUTION INTRAVENOUS CONTINUOUS
Status: DISCONTINUED | OUTPATIENT
Start: 2018-09-06 | End: 2018-09-07

## 2018-09-06 RX ORDER — HYDRALAZINE HYDROCHLORIDE 20 MG/ML
10 INJECTION INTRAMUSCULAR; INTRAVENOUS
Status: DISCONTINUED | OUTPATIENT
Start: 2018-09-06 | End: 2018-09-10 | Stop reason: HOSPADM

## 2018-09-06 RX ORDER — SODIUM CHLORIDE 0.9 % (FLUSH) 0.9 %
10 SYRINGE (ML) INJECTION PRN
Status: DISCONTINUED | OUTPATIENT
Start: 2018-09-06 | End: 2018-09-06 | Stop reason: SDUPTHER

## 2018-09-06 RX ORDER — PROMETHAZINE HYDROCHLORIDE 25 MG/ML
6.25 INJECTION, SOLUTION INTRAMUSCULAR; INTRAVENOUS
Status: DISCONTINUED | OUTPATIENT
Start: 2018-09-06 | End: 2018-09-06 | Stop reason: HOSPADM

## 2018-09-06 RX ORDER — DIPHENHYDRAMINE HYDROCHLORIDE 50 MG/ML
12.5 INJECTION INTRAMUSCULAR; INTRAVENOUS
Status: DISCONTINUED | OUTPATIENT
Start: 2018-09-06 | End: 2018-09-06 | Stop reason: HOSPADM

## 2018-09-06 RX ORDER — HYDROCODONE BITARTRATE AND ACETAMINOPHEN 5; 325 MG/1; MG/1
1 TABLET ORAL EVERY 4 HOURS PRN
Status: DISCONTINUED | OUTPATIENT
Start: 2018-09-06 | End: 2018-09-10 | Stop reason: HOSPADM

## 2018-09-06 RX ORDER — PROPOFOL 10 MG/ML
INJECTION, EMULSION INTRAVENOUS PRN
Status: DISCONTINUED | OUTPATIENT
Start: 2018-09-06 | End: 2018-09-06 | Stop reason: SDUPTHER

## 2018-09-06 RX ORDER — OXYCODONE HYDROCHLORIDE AND ACETAMINOPHEN 5; 325 MG/1; MG/1
2 TABLET ORAL PRN
Status: DISCONTINUED | OUTPATIENT
Start: 2018-09-06 | End: 2018-09-06 | Stop reason: HOSPADM

## 2018-09-06 RX ORDER — FENTANYL CITRATE 50 UG/ML
INJECTION, SOLUTION INTRAMUSCULAR; INTRAVENOUS PRN
Status: DISCONTINUED | OUTPATIENT
Start: 2018-09-06 | End: 2018-09-06 | Stop reason: SDUPTHER

## 2018-09-06 RX ORDER — ONDANSETRON 2 MG/ML
4 INJECTION INTRAMUSCULAR; INTRAVENOUS EVERY 6 HOURS PRN
Status: DISCONTINUED | OUTPATIENT
Start: 2018-09-06 | End: 2018-09-09

## 2018-09-06 RX ORDER — ONDANSETRON 2 MG/ML
4 INJECTION INTRAMUSCULAR; INTRAVENOUS
Status: DISCONTINUED | OUTPATIENT
Start: 2018-09-06 | End: 2018-09-06 | Stop reason: HOSPADM

## 2018-09-06 RX ORDER — SODIUM CHLORIDE 0.9 % (FLUSH) 0.9 %
10 SYRINGE (ML) INJECTION EVERY 12 HOURS SCHEDULED
Status: DISCONTINUED | OUTPATIENT
Start: 2018-09-06 | End: 2018-09-06 | Stop reason: SDUPTHER

## 2018-09-06 RX ORDER — EPHEDRINE SULFATE 50 MG/ML
INJECTION INTRAVENOUS PRN
Status: DISCONTINUED | OUTPATIENT
Start: 2018-09-06 | End: 2018-09-06 | Stop reason: SDUPTHER

## 2018-09-06 RX ADMIN — EPHEDRINE SULFATE 10 MG: 50 INJECTION INTRAVENOUS at 09:10

## 2018-09-06 RX ADMIN — PROPOFOL 30 MG: 10 INJECTION, EMULSION INTRAVENOUS at 08:05

## 2018-09-06 RX ADMIN — PROPOFOL 50 MG: 10 INJECTION, EMULSION INTRAVENOUS at 07:55

## 2018-09-06 RX ADMIN — ROCURONIUM BROMIDE 50 MG: 10 SOLUTION INTRAVENOUS at 07:15

## 2018-09-06 RX ADMIN — PHENYLEPHRINE HYDROCHLORIDE 100 MCG: 10 INJECTION INTRAVENOUS at 09:15

## 2018-09-06 RX ADMIN — PHENYLEPHRINE HYDROCHLORIDE 100 MCG: 10 INJECTION INTRAVENOUS at 08:35

## 2018-09-06 RX ADMIN — EPHEDRINE SULFATE 10 MG: 50 INJECTION INTRAVENOUS at 08:20

## 2018-09-06 RX ADMIN — OXYBUTYNIN CHLORIDE 5 MG: 5 TABLET ORAL at 20:57

## 2018-09-06 RX ADMIN — SODIUM CHLORIDE, POTASSIUM CHLORIDE, SODIUM LACTATE AND CALCIUM CHLORIDE: 600; 310; 30; 20 INJECTION, SOLUTION INTRAVENOUS at 08:05

## 2018-09-06 RX ADMIN — EPHEDRINE SULFATE 5 MG: 50 INJECTION INTRAVENOUS at 08:40

## 2018-09-06 RX ADMIN — SODIUM CHLORIDE: 9 INJECTION, SOLUTION INTRAVENOUS at 15:57

## 2018-09-06 RX ADMIN — HEPARIN SODIUM 5000 UNITS: 10000 INJECTION, SOLUTION INTRAVENOUS; SUBCUTANEOUS at 08:01

## 2018-09-06 RX ADMIN — PHENYLEPHRINE HYDROCHLORIDE 100 MCG: 10 INJECTION INTRAVENOUS at 09:25

## 2018-09-06 RX ADMIN — FENTANYL CITRATE 100 MCG: 50 INJECTION INTRAMUSCULAR; INTRAVENOUS at 07:15

## 2018-09-06 RX ADMIN — EPHEDRINE SULFATE 10 MG: 50 INJECTION INTRAVENOUS at 09:35

## 2018-09-06 RX ADMIN — SUGAMMADEX 120 MG: 100 INJECTION, SOLUTION INTRAVENOUS at 09:05

## 2018-09-06 RX ADMIN — EPHEDRINE SULFATE 20 MG: 50 INJECTION INTRAVENOUS at 07:35

## 2018-09-06 RX ADMIN — PHENYLEPHRINE HYDROCHLORIDE 100 MCG: 10 INJECTION INTRAVENOUS at 08:25

## 2018-09-06 RX ADMIN — MUPIROCIN: 20 OINTMENT TOPICAL at 21:06

## 2018-09-06 RX ADMIN — PHENYLEPHRINE HYDROCHLORIDE 2.5 MCG/MIN: 10 INJECTION INTRAVENOUS at 10:05

## 2018-09-06 RX ADMIN — EPHEDRINE SULFATE 10 MG: 50 INJECTION INTRAVENOUS at 09:20

## 2018-09-06 RX ADMIN — EPHEDRINE SULFATE 10 MG: 50 INJECTION INTRAVENOUS at 07:30

## 2018-09-06 RX ADMIN — CEFAZOLIN 1 G: 1 INJECTION, POWDER, FOR SOLUTION INTRAMUSCULAR; INTRAVENOUS at 07:35

## 2018-09-06 RX ADMIN — PHENYLEPHRINE HYDROCHLORIDE 40 MCG: 10 INJECTION INTRAVENOUS at 07:25

## 2018-09-06 RX ADMIN — PROPOFOL 150 MG: 10 INJECTION, EMULSION INTRAVENOUS at 07:15

## 2018-09-06 RX ADMIN — HYDROCODONE BITARTRATE AND ACETAMINOPHEN 2 TABLET: 5; 325 TABLET ORAL at 20:57

## 2018-09-06 RX ADMIN — PHENYLEPHRINE HYDROCHLORIDE: 10 INJECTION INTRAVENOUS at 07:30

## 2018-09-06 RX ADMIN — EPHEDRINE SULFATE 10 MG: 50 INJECTION INTRAVENOUS at 09:00

## 2018-09-06 RX ADMIN — EPHEDRINE SULFATE 10 MG: 50 INJECTION INTRAVENOUS at 09:30

## 2018-09-06 RX ADMIN — ATORVASTATIN CALCIUM 20 MG: 10 TABLET, FILM COATED ORAL at 20:56

## 2018-09-06 RX ADMIN — PHENYLEPHRINE HYDROCHLORIDE 12.5 MCG/MIN: 10 INJECTION INTRAVENOUS at 11:36

## 2018-09-06 RX ADMIN — PROTAMINE SULFATE 25 MG: 10 INJECTION, SOLUTION INTRAVENOUS at 08:50

## 2018-09-06 RX ADMIN — PHENYLEPHRINE HYDROCHLORIDE 200 MCG: 10 INJECTION INTRAVENOUS at 07:25

## 2018-09-06 RX ADMIN — PHENYLEPHRINE HYDROCHLORIDE 100 MCG: 10 INJECTION INTRAVENOUS at 07:35

## 2018-09-06 RX ADMIN — SODIUM CHLORIDE, PRESERVATIVE FREE 10 ML: 5 INJECTION INTRAVENOUS at 20:59

## 2018-09-06 RX ADMIN — ONDANSETRON 4 MG: 2 INJECTION INTRAMUSCULAR; INTRAVENOUS at 07:15

## 2018-09-06 RX ADMIN — SODIUM CHLORIDE, POTASSIUM CHLORIDE, SODIUM LACTATE AND CALCIUM CHLORIDE: 600; 310; 30; 20 INJECTION, SOLUTION INTRAVENOUS at 07:15

## 2018-09-06 RX ADMIN — EPHEDRINE SULFATE 5 MG: 50 INJECTION INTRAVENOUS at 08:30

## 2018-09-06 RX ADMIN — LIDOCAINE HYDROCHLORIDE 20 MG: 20 INJECTION, SOLUTION INFILTRATION; PERINEURAL at 07:15

## 2018-09-06 RX ADMIN — DOCUSATE SODIUM 100 MG: 100 CAPSULE, LIQUID FILLED ORAL at 20:57

## 2018-09-06 ASSESSMENT — PULMONARY FUNCTION TESTS
PIF_VALUE: 19
PIF_VALUE: 18
PIF_VALUE: 19
PIF_VALUE: 0
PIF_VALUE: 19
PIF_VALUE: 18
PIF_VALUE: 14
PIF_VALUE: 19
PIF_VALUE: 0
PIF_VALUE: 19
PIF_VALUE: 18
PIF_VALUE: 0
PIF_VALUE: 19
PIF_VALUE: 0
PIF_VALUE: 19
PIF_VALUE: 19
PIF_VALUE: 3
PIF_VALUE: 19
PIF_VALUE: 8
PIF_VALUE: 19
PIF_VALUE: 19
PIF_VALUE: 0
PIF_VALUE: 20
PIF_VALUE: 18
PIF_VALUE: 1
PIF_VALUE: 19
PIF_VALUE: 1
PIF_VALUE: 18
PIF_VALUE: 19
PIF_VALUE: 18
PIF_VALUE: 19
PIF_VALUE: 2
PIF_VALUE: 18
PIF_VALUE: 2
PIF_VALUE: 18
PIF_VALUE: 18
PIF_VALUE: 19
PIF_VALUE: 32
PIF_VALUE: 19
PIF_VALUE: 17
PIF_VALUE: 18
PIF_VALUE: 19
PIF_VALUE: 18
PIF_VALUE: 19
PIF_VALUE: 18
PIF_VALUE: 19
PIF_VALUE: 19
PIF_VALUE: 18
PIF_VALUE: 20
PIF_VALUE: 3
PIF_VALUE: 19
PIF_VALUE: 14
PIF_VALUE: 0
PIF_VALUE: 19
PIF_VALUE: 18
PIF_VALUE: 19
PIF_VALUE: 3
PIF_VALUE: 2
PIF_VALUE: 19
PIF_VALUE: 19
PIF_VALUE: 18
PIF_VALUE: 18
PIF_VALUE: 19
PIF_VALUE: 18
PIF_VALUE: 18
PIF_VALUE: 19
PIF_VALUE: 18
PIF_VALUE: 19
PIF_VALUE: 0
PIF_VALUE: 19
PIF_VALUE: 0
PIF_VALUE: 19
PIF_VALUE: 18
PIF_VALUE: 19
PIF_VALUE: 0
PIF_VALUE: 19
PIF_VALUE: 19
PIF_VALUE: 0
PIF_VALUE: 19
PIF_VALUE: 0
PIF_VALUE: 18
PIF_VALUE: 19
PIF_VALUE: 0
PIF_VALUE: 18
PIF_VALUE: 19
PIF_VALUE: 19
PIF_VALUE: 2
PIF_VALUE: 20
PIF_VALUE: 18
PIF_VALUE: 19
PIF_VALUE: 0
PIF_VALUE: 0
PIF_VALUE: 1
PIF_VALUE: 18
PIF_VALUE: 19
PIF_VALUE: 18
PIF_VALUE: 18
PIF_VALUE: 20
PIF_VALUE: 19
PIF_VALUE: 18
PIF_VALUE: 19
PIF_VALUE: 11
PIF_VALUE: 19
PIF_VALUE: 19
PIF_VALUE: 20
PIF_VALUE: 18
PIF_VALUE: 3
PIF_VALUE: 3
PIF_VALUE: 19
PIF_VALUE: 0
PIF_VALUE: 20
PIF_VALUE: 19
PIF_VALUE: 18

## 2018-09-06 ASSESSMENT — PAIN SCALES - GENERAL: PAINLEVEL_OUTOF10: 7

## 2018-09-06 NOTE — ANESTHESIA POSTPROCEDURE EVALUATION
Department of Anesthesiology  Postprocedure Note    Patient: Kerri Miner  MRN: [id number]  YOB: 1945  Date of evaluation: 9/6/2018  Time:  2:47 PM     Procedure Summary     Date:  09/06/18 Room / Location:  [place] [address] (HYBRID) / [facility]    Anesthesia Start:  0715 Anesthesia Stop:  2147    Procedure:  LEFT CAROTID ENDARTERECTOMY (Left Neck) Diagnosis:  (-)    Surgeon: David Medrano MD Responsible Provider:  Carlos Nova MD    Anesthesia Type:  general ASA Status:  3          Anesthesia Type: general    Leonard Phase I: Leonard Score: 9    Leonard Phase II:      Last vitals: Reviewed and per EMR flowsheets. Anesthesia Post Evaluation    Patient location during evaluation: PACU  Patient participation: complete - patient participated  Level of consciousness: awake and alert  Airway patency: patent  Nausea & Vomiting: no nausea and no vomiting  Complications: no  Cardiovascular status: blood pressure returned to baseline  Respiratory status: acceptable  Hydration status: euvolemic  Comments: Prior to discharge from PACU, pt smiling responding to conversation with right arm strength equal to pre-op assessment.

## 2018-09-06 NOTE — PROGRESS NOTES
Patient arrived to PACU at 0945. Received report from [facility] and [facility]. Approximately 15 mins after patient came to PACU patient's SBp dropped to the 70s from 110s. Started fluid bolus and called Dr. Lilliana Motley to bedside. [facility] and Dr. Lilliana Motley came and administered briseyda bolus. Patient's SBP then went to 200. Within 10 mins SBP was back to 80s. Patient was able to follow commands for moving extremities. PILAR. Dr. Alesha Campo came to bedside and ordered for patient to go to ICU for closer monitoring but patient was to stay in PACU for a couple hours for close monitoring of labile pressures.

## 2018-09-06 NOTE — CONSULTS
HYSTERECTOMY, TOTAL ABDOMINAL      LITHOTRIPSY      [place] OFFICE/OUTPT VISIT,PROCEDURE ONLY Left 8/13/2018    LEFT VIDEO ASSISTED THORACOSCOPIC SURGERY WITH BIOPSY OF AP WINDOW LYMPH NODE, POSSIBLE LEFT UPPER LOBE WEDGE RESECTION          CPT CODES - 76060, 89936 performed by Epi Garcia MD at [address] [place] [address] Left 08/13/2018    Dr. Elodia Rice - video assisted w/wedge resection of LATANYA, MSLND       Home Medications:   Prior to Admission medications    Medication Sig Start Date End Date Taking? Authorizing Provider   traMADol (ULTRAM) 50 MG tablet Take 25 mg by mouth every 6 hours as needed for Pain. .   Yes Historical Provider, MD   ranitidine (ZANTAC) 150 MG capsule Take 150 mg by mouth 2 times daily   Yes Historical Provider, MD   docusate sodium (COLACE, DULCOLAX) 100 MG CAPS Take 100 mg by mouth 2 times daily 8/15/18  Yes SORAIDA Sheridan - CNP   citalopram (CELEXA) 20 MG tablet Take 1 tablet by mouth daily 7/12/18  Yes Vaishnavi Vallejo MD   oxybutynin (DITROPAN) 5 MG tablet Take 1 tablet by mouth 2 times daily 7/12/18  Yes Vaishnavi Vallejo MD   aspirin 81 MG tablet Take 81 mg by mouth daily   Yes Historical Provider, MD   gabapentin (NEURONTIN) 300 MG capsule Take 300 mg by mouth every evening. .   Yes Historical Provider, MD   atorvastatin (LIPITOR) 20 MG tablet TAKE ONE TABLET BY MOUTH DAILY 6/4/18  Yes Vaishnavi Vallejo MD   Cholecalciferol (VITAMIN D3) 2000 UNITS CAPS Take  by mouth. Yes Historical Provider, MD   vitamin B-12 (CYANOCOBALAMIN) 1000 MCG tablet Take 1,000 mcg by mouth daily. Yes Historical Provider, MD   therapeutic multivitamin-minerals (THERAGRAN-M) tablet Take 1 tablet by mouth daily.      Yes Historical Provider, MD   losartan (COZAAR) 50 MG tablet Take 50 mg by mouth daily    Historical Provider, MD   albuterol sulfate HFA (VENTOLIN HFA) 108 (90 Base) MCG/ACT inhaler Inhale 2 puffs into the lungs every 6 hours as needed for Wheezing    Historical Provider, MD   oxybutynin [facility] XL) 15 MG CR tablet TAKE ONE TABLET BY MOUTH EVERY NIGHT AT BEDTIME 3/5/13 8/22/18  Patrica Vallejo MD        Facility Administered Medications:    ceFAZolin  1 g Intravenous On Call to OR    aspirin  81 mg Oral Daily    vitamin D  2,000 Units Oral Daily    citalopram  20 mg Oral Daily    gabapentin  300 mg Oral QPM    losartan  50 mg Oral Daily    therapeutic multivitamin-minerals  1 tablet Oral Daily    vitamin B-12  1,000 mcg Oral Daily    sodium chloride flush  10 mL Intravenous 2 times per day    clopidogrel  75 mg Oral Daily    atorvastatin  20 mg Oral Nightly    oxybutynin  5 mg Oral BID    famotidine  20 mg Oral BID    docusate sodium  100 mg Oral BID    polyethylene glycol  17 g Oral Daily       Allergies:  Codeine and Morphine     Social History:      Social History     Social History    Marital status:      Spouse name: N/A    Number of children: N/A    Years of education: N/A     Occupational History    Not on file.      Social History Main Topics    Smoking status: Former Smoker     Packs/day: 1.00     Years: 50.00     Types: Cigarettes     Quit date: 8/7/2018    Smokeless tobacco: Never Used    Alcohol use No      Comment: 5/18- No alcohol    Drug use: No    Sexual activity: No     Other Topics Concern    Not on file     Social History Narrative    No narrative on file       Family History:    Family History   Problem Relation Age of Onset    High Blood Pressure Mother     Cancer Mother [de-identified]        Breast and Lung    Cancer Sister 52        Brain Tumor    Heart Disease Brother 39    Cancer Maternal Grandmother         Breast    Other Father         abdominal aneurysm    Glaucoma Father     Cancer Paternal Uncle         lung    Diabetes Sister     High Blood Pressure Sister     High Cholesterol Sister     Other Sister         Gallstone    Cancer Sister         Breast    Cancer Sister         brain tumor       Review of Systems:  A 14 point review of systems was completed. Pertinent positives identified in the HPI, all other review of systems negative. Physical Examination:    BP (!) 164/75   Pulse 61   Temp 97.7 °F (36.5 °C) (Oral)   Resp 16   Ht 5' 5\" (1.651 m)   Wt 120 lb (54.4 kg)   SpO2 96%   BMI 19.97 kg/m²        Admission Weight: 120 lb (54.4 kg)       General appearance: NAD  Eyes: PERRLA  Neck: no JVD, no lymphadenopathy. Respiratory: effort is unlabored, no crackles, wheezes or rubs. Cardiovascular: regular, no murmur. Left carotid bruits. No edema or varicosities. Pulses:    carotid brachial radial   RIGHT 2 2 2   LEFT 2 2 2   GI: abdomen soft, nondistended, no organomegaly. Musculoskeletal: strength and tone normal.  Extremities: warm and pink. Skin: no dermatitis or ulceration. Neuro/psychiatric: grossly intact. MEDICAL DECISION MAKING/TESTING        CTA:    Left ICA proximal stenosis of approximately 90% by NASCET criteria.       Right ICA proximal stenosis of approximately 50% by NASCET criteria.       Right vertebral artery origin severe focal stenosis, which remains patent   distally.  Bilateral vertebral arteries demonstrate scattered atheromatous   plaques without additional significant stenosis.       Intracranially, there are carotid siphon calcifications without significant   stenosis of the anterior or posterior circulations.       Left upper lobe nodular airspace disease.  Minimal right upper lobe   ground-glass opacities.  Findings better characterized on PET-CT 07/05/2018. Differential diagnosis includes infection, posttreatment change and   malignancy.       Partially calcified mediastinal lymphadenopathy.       Right thyroid lobe exophytic 1.5 cm nodule.  Nonemergent thyroid ultrasound   recommended. MRI:   1. Multiple scattered punctate acute infarcts are seen throughout the left   cerebral hemisphere. Danteald Melissa may be embolic in nature. 2. There is no significant mass effect or midline shift.    3. Mild-to-moderate

## 2018-09-06 NOTE — PROGRESS NOTES
Speech Language Pathology  Attempt Tx Note    SLP reviewed chart and spoke with RN. Per RN, pt had just fallen asleep and RN requested that pt remain asleep at this time. RN was asking this SLP about how pt takes medication. SLP reviewing BSE eval results with RN recommendning pills whole with water as tolerated; whole in puree if needed. SLP will continue to follow and re-attempt following day as schedule allows.              Augustus Johnson M.A., [address] [facility] #[id number]  Speech-Language Pathologist

## 2018-09-06 NOTE — PROGRESS NOTES
Dr. Violet Powers called to bedside to evaluate patient. Patient had c/o headache over the Left frontal lobe. Patient was either unable not following directions to bring to right corner. Patient was able to move all extremities. Patient was petra to ask where Bobbi Espinal was. Will continue to monitor.

## 2018-09-06 NOTE — PROGRESS NOTES
Evelynangela Roberts  Neurology Follow-up  [facility] Neurology    Date of Service: 9/6/2018    Subjective:   CC: Follow up today regarding: acute stroke    Events noted. Chart and lab reviewed. The patient was seen by vascular. She had left CEA today. She is not on the ICU. She is awake alert but seems to be slightly confused compared to yesterday. She denies any headache or double vision or blurred vision. No apparent focal weakness. Other review of system was limited.       Past Medical History:   Diagnosis Date    Acute cerebrovascular accident (CVA) ([address] 9/3/2018    Alcohol abuse 5/22/2018    Asthma     COPD, severe ([address] 5/26/2015    Coronary artery disease involving native coronary artery of native heart without angina pectoris 8/4/2017    Essential hypertension     GERD (gastroesophageal reflux disease)     Hyperlipidemia     Hypertension     Injury of esophagus     inflamed    Insomnia     Kidney stone [id number]    Malignant neoplasm of bronchus of left upper lobe ([facility] 8/13/2018    Mass of upper lobe of left lung 08/2017    Neuropathy 11/13/2014    Osteoarthritis     Prolonged emergence from general anesthesia     Stenosis of internal carotid artery with cerebral infarction, left (HCC)     Urinary incontinence      Current Facility-Administered Medications   Medication Dose Route Frequency Provider Last Rate Last Dose    nitroGLYCERIN 50 mg in dextrose 5% 250 mL infusion  5 mcg/min Intravenous Continuous PRN Justin Low MD        phenylephrine (CINDY-SYNEPHRINE) 50 mg in dextrose 5 % 250 mL infusion  50 mcg/min Intravenous Continuous PRN Justin Low MD 7.2 mL/hr at 09/06/18 1306 24 mcg/min at 09/06/18 1306    nitroGLYCERIN 200-5 MCG/ML-% infusion             albuterol sulfate  (90 Base) MCG/ACT inhaler 2 puff  2 puff Inhalation Q6H PRN SORAIDA Garcia CNP        aspirin chewable tablet 81 mg  81 mg Oral Daily SORAIDA Garcia CNP   Stopped 100 mg at 09/05/18 2119    traMADol (ULTRAM) tablet 25 mg  25 mg Oral Q6H PRN SORAIDA Anderson CNP   25 mg at 09/03/18 0246    polyethylene glycol (GLYCOLAX) packet 17 g  17 g Oral Daily SORAIDA Anderson CNP   17 g at 09/05/18 1313     Facility-Administered Medications Ordered in Other Encounters   Medication Dose Route Frequency Provider Last Rate Last Dose    0.9 % sodium chloride infusion   Intravenous Once Mata MD Suzy         Allergies   Allergen Reactions    Codeine Itching    Morphine Itching and Other (See Comments)     On file at [facility]     family history includes Cancer in her maternal grandmother, paternal uncle, sister, and sister; Cancer (age of onset: 52) in her sister; Cancer (age of onset: [de-identified]) in her mother; Diabetes in her sister; Glaucoma in her father; Heart Disease (age of onset: 39) in her brother; High Blood Pressure in her mother and sister; High Cholesterol in her sister; Other in her father and sister. reports that she quit smoking about 4 weeks ago. Her smoking use included Cigarettes. She has a 50.00 pack-year smoking history. She has never used smokeless tobacco. She reports that she does not drink alcohol or use drugs. Objective:  Exam:  Constitutional:   Vitals:    09/06/18 1150 09/06/18 1200 09/06/18 1210 09/06/18 1305   BP: (!) 92/38 (!) 116/51 (!) 105/43    Pulse: 65 62 60 64   Resp: 24 13 13 19   Temp:       TempSrc:    Oral   SpO2: 91% 97% 100% 100%   Weight:       Height:           General appearance: NAD. Eye: No icterus. PRRR. Neck: supple  Cardiovascular:          No lower leg edema with good pulsation. Mental Status: AAO times one, more confused than yesterday. Unable to follow directions  Cranial Nerves: no change  II: Visual fields: NT  III: Pupils: equal, round, reactive to light  III,IV,VI: Extra Ocular Movements are intact.  No nystagmus  V: Facial sensation is intact to pin prick and light touch  IX: Palate elevation is symmetric  XI: Shoulder shrug is intact  XII: Tongue movements are normal  Musculoskeletal: can move all 4 limbs without focal weakness. Nl tone. DTR symmetric  Planters down  Coordination: no pronator drift, no dysmetria. .  Sensation: normal to all modalities. Gait/Posture: NT due to weakness    Data:  LABS:   Lab Results   Component Value Date     09/02/2018    K 3.8 09/02/2018     09/02/2018    CO2 26 09/02/2018    BUN 15 09/02/2018    CREATININE 0.9 09/02/2018    GFRAA >60 09/02/2018    GFRAA >60 03/05/2013    LABGLOM >60 09/02/2018    GLUCOSE 110 09/02/2018    PHOS 3.1 08/15/2018    MG 2.00 08/15/2018    CALCIUM 9.5 09/02/2018     Lab Results   Component Value Date    WBC 9.9 09/02/2018    RBC 4.23 09/02/2018    HGB 12.6 09/02/2018    HCT 37.0 09/02/2018    MCV 87.4 09/02/2018    RDW 12.4 09/02/2018     09/02/2018     Lab Results   Component Value Date    INR 1.07 09/02/2018    PROTIME 12.2 09/02/2018     Neuroimaging and/or  labs reviewed by me and discussed results with the patient   Impression:  New ischemic left hemispheric CVA. Possible thromboembolic from left ICA stenosis. . S/P left CEA  Acute encephalopathy. Worse than yesterday ? Post op or toxic encephalopathy   Hypertension  Hyperlipidemia  CAD        Recommendation  Continue the current supportive care  Neuro checks  PT and OT  Aspiration precautions  Continue aspirin  Statin  Blood pressure monitor and control  Continue current BP medications  Continue Plavix  Speech evaluation  Will follow   DW ICU nurse             Lottie Ruffin MD   [phone]      This dictation was generated by voice recognition computer software. Although all attempts are made to edit the dictation for accuracy, there may be errors in the transcription that are not intended.

## 2018-09-06 NOTE — ANESTHESIA PRE PROCEDURE
Department of Anesthesiology  Preprocedure Note       Name:  James Locke   Age:  67 y.o.  :  1945                                          MRN:  [id number]         Date:  2018      Surgeon: Brenda Alvarenga):  Tiera Li MD    Procedure: Procedure(s):  LEFT CAROTID ENDARTERECTOMY    Medications prior to admission:   Prior to Admission medications    Medication Sig Start Date End Date Taking? Authorizing Provider   traMADol (ULTRAM) 50 MG tablet Take 25 mg by mouth every 6 hours as needed for Pain. .   Yes Historical Provider, MD   ranitidine (ZANTAC) 150 MG capsule Take 150 mg by mouth 2 times daily   Yes Historical Provider, MD   docusate sodium (COLACE, DULCOLAX) 100 MG CAPS Take 100 mg by mouth 2 times daily 8/15/18  Yes SORAIDA Sheridan CNP   citalopram (CELEXA) 20 MG tablet Take 1 tablet by mouth daily 18  Yes Vaishnavi Vallejo MD   oxybutynin (DITROPAN) 5 MG tablet Take 1 tablet by mouth 2 times daily 18  Yes Vaishnavi Vallejo MD   aspirin 81 MG tablet Take 81 mg by mouth daily   Yes Historical Provider, MD   gabapentin (NEURONTIN) 300 MG capsule Take 300 mg by mouth every evening. .   Yes Historical Provider, MD   atorvastatin (LIPITOR) 20 MG tablet TAKE ONE TABLET BY MOUTH DAILY 18  Yes Vaishnavi Vallejo MD   Cholecalciferol (VITAMIN D3) 2000 UNITS CAPS Take  by mouth. Yes Historical Provider, MD   vitamin B-12 (CYANOCOBALAMIN) 1000 MCG tablet Take 1,000 mcg by mouth daily. Yes Historical Provider, MD   therapeutic multivitamin-minerals (THERAGRAN-M) tablet Take 1 tablet by mouth daily.      Yes Historical Provider, MD   losartan (COZAAR) 50 MG tablet Take 50 mg by mouth daily    Historical Provider, MD   albuterol sulfate HFA (VENTOLIN HFA) 108 (90 Base) MCG/ACT inhaler Inhale 2 puffs into the lungs every 6 hours as needed for Wheezing    Historical Provider, MD   oxybutynin (DITROPAN XL) 15 MG CR tablet TAKE ONE TABLET BY MOUTH EVERY NIGHT AT BEDTIME 3/5/13 8/22/18  Vaishnavi Vallejo bronchus of left upper lobe (HCC) C34.12    Acute postoperative pain G89.18    S/P thoracotomy Z98.890    Acute cerebrovascular accident (CVA) ([facility] I63.9    Acute right-sided weakness M62.89    HTN (hypertension), benign I10       Past Medical History:        Diagnosis Date    Acute cerebrovascular accident (CVA) ([facility] 9/3/2018    Alcohol abuse 5/22/2018    Asthma     COPD, severe ([address] 5/26/2015    Coronary artery disease involving native coronary artery of native heart without angina pectoris 8/4/2017    Essential hypertension     GERD (gastroesophageal reflux disease)     Hyperlipidemia     Hypertension     Injury of esophagus     inflamed    Insomnia     Kidney stone 6363-1209    Malignant neoplasm of bronchus of left upper lobe ([facility] 8/13/2018    Mass of upper lobe of left lung 08/2017    Neuropathy 11/13/2014    Osteoarthritis     Prolonged emergence from general anesthesia     Urinary incontinence        Past Surgical History:        Procedure Laterality Date    BRONCHOSCOPY  08/16/2017    Dr. Kacey Staley - w/BAL of LATANYA, LLL; EBUS guided lymph node biopsies, TBNA of LATANYA lung mass    COLONOSCOPY  02/05/2013    Dr. Izaiah Myles - 4 diminutive sessile rectal polyps    COLONOSCOPY  02/02/2010    Dr. Izaiah Myles - adenomatous colon polyps    HYSTERECTOMY, TOTAL ABDOMINAL      LITHOTRIPSY      [place] OFFICE/OUTPT VISIT,PROCEDURE ONLY Left 8/13/2018    LEFT VIDEO ASSISTED THORACOSCOPIC SURGERY WITH BIOPSY OF AP WINDOW LYMPH NODE, POSSIBLE LEFT UPPER LOBE WEDGE RESECTION          CPT CODES - 24832, 77246 performed by Umm Riggins MD at [address] [place] [address] Left 08/13/2018    Dr. Janice Nickerson - video assisted w/wedge resection of LATANYA, MSLND       Social History:    Social History   Substance Use Topics    Smoking status: Former Smoker     Packs/day: 1.00     Years: 50.00     Types: Cigarettes     Quit date: 8/7/2018    Smokeless tobacco: Never Used    Alcohol use No      Comment: 5/18- No [address]    Anesthesia Evaluation   no history of anesthetic complications:   Airway: Mallampati: II  TM distance: >3 FB   Neck ROM: limited  Mouth opening: > = 3 FB Dental:    (+) upper dentures      Pulmonary:   (+) COPD:  asthma:                            Cardiovascular:    (+) hypertension:, CAD:, hyperlipidemia         Beta Blocker:  Not on Beta Blocker         Neuro/Psych:   (+) CVA: residual symptoms, psychiatric history:            GI/Hepatic/Renal:   (+) GERD: well controlled,          ROS comment: H/o EtOH abuse. Endo/Other: Negative Endo/Other ROS   (+) : arthritis: OA., .                 Abdominal:           Vascular:                                     Pre-Operative Diagnosis: Acute cerebrovascular accident (CVA) ([facility] [I63.9]; Acute cerebrovascular accident (CVA) ([facility] [I63.9]    67 y.o.   BMI:  Body mass index is 19.97 kg/m².      Vitals:    09/05/18 1200 09/05/18 2116 09/05/18 2330 09/06/18 0415   BP: 117/60 (!) 171/72 (!) 150/68 (!) 164/75   Pulse: 63 61 61 61   Resp: 16 16 16 16   Temp: 98.2 °F (36.8 °C) 97.8 °F (36.6 °C) 98.1 °F (36.7 °C) 97.7 °F (36.5 °C)   TempSrc: Oral Oral Oral Oral   SpO2: 96% 96% 97% 96%   Weight:       Height:           Allergies   Allergen Reactions    Codeine Itching    Morphine Itching and Other (See Comments)     On file at [address] History   Substance Use Topics    Smoking status: Former Smoker     Packs/day: 1.00     Years: 50.00     Types: Cigarettes     Quit date: 8/7/2018    Smokeless tobacco: Never Used    Alcohol use No      Comment: 5/18- No alcohol       LABS:    CBC  Lab Results   Component Value Date/Time    WBC 9.9 09/02/2018 09:28 PM    HGB 12.6 09/02/2018 09:28 PM    HCT 37.0 09/02/2018 09:28 PM     09/02/2018 09:28 PM     RENAL  Lab Results   Component Value Date/Time     09/02/2018 09:28 PM    K 3.8 09/02/2018 09:28 PM     09/02/2018 09:28 PM    CO2 26 09/02/2018 09:28 PM    BUN 15 09/02/2018 09:28 PM    CREATININE 0.9

## 2018-09-06 NOTE — PROGRESS NOTES
Hospitalist Progress Note      PCP: Winifred Moeller MD    Date of Admission: 9/2/2018    Chief Complaint: confusion    Hospital Course: reviewed H and P    Subjective: Pt rama PO. Remains confused. Awaiting vascular surgery earlier today. Medications:  Reviewed    Infusion Medications   Scheduled Medications    [START ON 9/6/2018] ceFAZolin  1 g Intravenous On Call to OR    aspirin  81 mg Oral Daily    vitamin D  2,000 Units Oral Daily    citalopram  20 mg Oral Daily    gabapentin  300 mg Oral QPM    losartan  50 mg Oral Daily    therapeutic multivitamin-minerals  1 tablet Oral Daily    vitamin B-12  1,000 mcg Oral Daily    sodium chloride flush  10 mL Intravenous 2 times per day    clopidogrel  75 mg Oral Daily    atorvastatin  20 mg Oral Nightly    oxybutynin  5 mg Oral BID    famotidine  20 mg Oral BID    docusate sodium  100 mg Oral BID    polyethylene glycol  17 g Oral Daily     PRN Meds: albuterol sulfate HFA, sodium chloride flush, magnesium hydroxide, ondansetron, labetalol, traMADol      Intake/Output Summary (Last 24 hours) at 09/05/18 5371  Last data filed at 09/05/18 1514   Gross per 24 hour   Intake              480 ml   Output              350 ml   Net              130 ml       Physical Exam Performed:    BP (!) 171/72   Pulse 61   Temp 97.8 °F (36.6 °C) (Oral)   Resp 16   Ht 5' 5\" (1.651 m)   Wt 120 lb (54.4 kg)   SpO2 96%   BMI 19.97 kg/m²      General appearance:  Mild apparent distress, appears stated age and uncooperative with exam.She alternates with anger and  crying. HEENT:  Normal cephalic, atraumatic without obvious deformity. Pupils equal, round, and reactive to light. Conjunctivae/corneas clear. Neck: Supple, with full range of motion. No jugular venous distention. Trachea midline. Respiratory:  Normal respiratory effort. Cardiovascular:  Regular rate and rhythm with normal S1/S2 without murmurs, rubs or gallops.   Abdomen: Soft, non-tender, non-distended with normal bowel sounds. Musculoskeletal:  No clubbing, cyanosis or edema bilaterally. Full range of motion without deformity. Skin: Skin color, texture, turgor normal.  No rashes or lesions. Neurologic:  CN2-12 intact  Psychiatric: Confused  Capillary Refill: Brisk,< 3 seconds   Peripheral Pulses: +2 palpable, equal bilaterally     Labs:   No results for input(s): WBC, HGB, HCT, PLT in the last 72 hours. No results for input(s): NA, K, CL, CO2, BUN, CREATININE, CALCIUM, PHOS in the last 72 hours. Invalid input(s): MAGNES  No results for input(s): AST, ALT, BILIDIR, BILITOT, ALKPHOS in the last 72 hours. No results for input(s): INR in the last 72 hours. Recent Labs      09/03/18   0156  09/03/18   0714   TROPONINI  <0.01  <0.01       Urinalysis:      Lab Results   Component Value Date    NITRU Negative 09/02/2018    WBCUA 3-5 09/02/2018    BACTERIA Rare 09/02/2018    RBCUA >100 09/02/2018    BLOODU LARGE 09/02/2018    SPECGRAV >=1.030 09/02/2018    GLUCOSEU Negative 09/02/2018       Radiology:  CTA HEAD W CONTRAST   Final Result   Left ICA proximal stenosis of approximately 90% by NASCET criteria. Right ICA proximal stenosis of approximately 50% by NASCET criteria. Right vertebral artery origin severe focal stenosis, which remains patent   distally. Bilateral vertebral arteries demonstrate scattered atheromatous   plaques without additional significant stenosis. Intracranially, there are carotid siphon calcifications without significant   stenosis of the anterior or posterior circulations. Left upper lobe nodular airspace disease. Minimal right upper lobe   ground-glass opacities. Findings better characterized on PET-CT 07/05/2018. Differential diagnosis includes infection, posttreatment change and   malignancy. Partially calcified mediastinal lymphadenopathy. Right thyroid lobe exophytic 1.5 cm nodule. Nonemergent thyroid ultrasound   recommended.          CTA NECK W CONTRAST   Final Result   Left ICA proximal stenosis of approximately 90% by NASCET criteria. Right ICA proximal stenosis of approximately 50% by NASCET criteria. Right vertebral artery origin severe focal stenosis, which remains patent   distally. Bilateral vertebral arteries demonstrate scattered atheromatous   plaques without additional significant stenosis. Intracranially, there are carotid siphon calcifications without significant   stenosis of the anterior or posterior circulations. Left upper lobe nodular airspace disease. Minimal right upper lobe   ground-glass opacities. Findings better characterized on PET-CT 07/05/2018. Differential diagnosis includes infection, posttreatment change and   malignancy. Partially calcified mediastinal lymphadenopathy. Right thyroid lobe exophytic 1.5 cm nodule. Nonemergent thyroid ultrasound   recommended. VL DUP CAROTID BILATERAL   Final Result      CT HEAD WO CONTRAST   Final Result   No acute intracranial abnormality. MRI BRAIN WO CONTRAST   Final Result   1. Multiple scattered punctate acute infarcts are seen throughout the left   cerebral hemisphere. These may be embolic in nature. 2. There is no significant mass effect or midline shift. 3. Mild-to-moderate global parenchymal volume loss with chronic microvascular   ischemic change. These results were sent to the [facility] [address] ([address] [place] [address]) on 9/4/2018   at 2:38 pm to be communicated to the referring/covering health care   provider/office. XR CHEST STANDARD (2 VW)   Final Result   No acute disease. CT HEAD WO CONTRAST   Final Result   No acute intracranial abnormality. Stable left caudate lobe lacunar stroke. White matter hypoattenuation described is typical of microvascular ischemic   disease or as sequela of dysmyelinating/demyelinating processes.                  Assessment/Plan:    Active Hospital Problems    Diagnosis Date Noted    Moderate malnutrition ([facility] [E44.0] 09/04/2018    Acute cerebrovascular accident (CVA) ([facility] [I63.9] 09/03/2018    Acute right-sided weakness [M62.89]     HTN (hypertension), benign [I10]     S/P thoracotomy [Z98.890]     Malignant neoplasm of bronchus of left upper lobe ([facility] [C34.12] 08/13/2018    Coronary artery disease involving native coronary artery of native heart without angina pectoris [I25.10] 08/04/2017    COPD, severe ([facility] [J44.9] 05/26/2015    Hyperlipidemia, familial, high LDL [E78.4]      Acute left hemispheric cerebrovascular accident - embolic vs thromboembolic. CTA, ECHO reviewed. CTA with left ICA stnaosis. Vascular surgery consulted and following. PT/OT. Neurology following. Continue ASA, statin, plavix. Lung cancer - wedge procedure with Dr. Saman Chau to [facility]. Plan for port insertion on 9/6. COPD - stable. Albuterol prn.      DVT Prophylaxis: lovenox  Diet: DIET GENERAL;  Dietary Nutrition Supplements: Standard High Calorie Oral Supplement  Diet NPO, After Midnight  Code Status: Full Code    PT/OT Eval Status: attempted to see pt    Dispo - 1-2 days    Remedios Amin MD

## 2018-09-06 NOTE — PROGRESS NOTES
Occupational Therapy  Attempted OT session. Pt had L carotid endarterectomy today. Please provide new OT orders post-op.   Jose Landaverde OTR/L

## 2018-09-06 NOTE — PROGRESS NOTES
Keep patient's SBP between .     Order per dr. Yancy Daniel Pts spouse wanted to inform [facility] pt was admitted to [address] [place] [address] on 10/13. Aware office is closed until Monday.

## 2018-09-06 NOTE — BRIEF OP NOTE
6:00 AM   Urine Color Yellow 8/14/2018  6:00 AM   Urine Appearance Clear 8/14/2018  6:00 AM   Output (mL) 75 mL 8/14/2018 10:00 AM       [REMOVED] Urethral Catheter Non-latex 16 fr (Removed)   Output (mL) 300 mL 9/3/2018  1:59 PM           Duy Hill MD  Date: 9/6/2018  Time: 9:11 AM

## 2018-09-07 LAB
ANION GAP SERPL CALCULATED.3IONS-SCNC: 11 MMOL/L (ref 3–16)
BUN BLDV-MCNC: 9 MG/DL (ref 7–20)
CALCIUM SERPL-MCNC: 8.5 MG/DL (ref 8.3–10.6)
CHLORIDE BLD-SCNC: 107 MMOL/L (ref 99–110)
CO2: 24 MMOL/L (ref 21–32)
CREAT SERPL-MCNC: 0.6 MG/DL (ref 0.6–1.2)
GFR AFRICAN AMERICAN: >60
GFR NON-AFRICAN AMERICAN: >60
GLUCOSE BLD-MCNC: 103 MG/DL (ref 70–99)
HCT VFR BLD CALC: 26.4 % (ref 36–48)
HEMOGLOBIN: 9 G/DL (ref 12–16)
MCH RBC QN AUTO: 29.8 PG (ref 26–34)
MCHC RBC AUTO-ENTMCNC: 34.3 G/DL (ref 31–36)
MCV RBC AUTO: 86.9 FL (ref 80–100)
PDW BLD-RTO: 12.4 % (ref 12.4–15.4)
PLATELET # BLD: 207 K/UL (ref 135–450)
PMV BLD AUTO: 8.2 FL (ref 5–10.5)
POTASSIUM SERPL-SCNC: 3.7 MMOL/L (ref 3.5–5.1)
RBC # BLD: 3.03 M/UL (ref 4–5.2)
SODIUM BLD-SCNC: 142 MMOL/L (ref 136–145)
WBC # BLD: 8.1 K/UL (ref 4–11)

## 2018-09-07 PROCEDURE — 80048 BASIC METABOLIC PNL TOTAL CA: CPT

## 2018-09-07 PROCEDURE — G8988 SELF CARE GOAL STATUS: HCPCS

## 2018-09-07 PROCEDURE — 2580000003 HC RX 258: Performed by: NURSE PRACTITIONER

## 2018-09-07 PROCEDURE — 2580000003 HC RX 258: Performed by: SURGERY

## 2018-09-07 PROCEDURE — G8978 MOBILITY CURRENT STATUS: HCPCS

## 2018-09-07 PROCEDURE — 6370000000 HC RX 637 (ALT 250 FOR IP): Performed by: SURGERY

## 2018-09-07 PROCEDURE — 51701 INSERT BLADDER CATHETER: CPT

## 2018-09-07 PROCEDURE — 97167 OT EVAL HIGH COMPLEX 60 MIN: CPT

## 2018-09-07 PROCEDURE — 6370000000 HC RX 637 (ALT 250 FOR IP): Performed by: FAMILY MEDICINE

## 2018-09-07 PROCEDURE — 83036 HEMOGLOBIN GLYCOSYLATED A1C: CPT

## 2018-09-07 PROCEDURE — 2060000000 HC ICU INTERMEDIATE R&B

## 2018-09-07 PROCEDURE — 99233 SBSQ HOSP IP/OBS HIGH 50: CPT | Performed by: PSYCHIATRY & NEUROLOGY

## 2018-09-07 PROCEDURE — G8979 MOBILITY GOAL STATUS: HCPCS

## 2018-09-07 PROCEDURE — 51702 INSERT TEMP BLADDER CATH: CPT

## 2018-09-07 PROCEDURE — 97164 PT RE-EVAL EST PLAN CARE: CPT

## 2018-09-07 PROCEDURE — 51798 US URINE CAPACITY MEASURE: CPT

## 2018-09-07 PROCEDURE — 85027 COMPLETE CBC AUTOMATED: CPT

## 2018-09-07 PROCEDURE — 6370000000 HC RX 637 (ALT 250 FOR IP)

## 2018-09-07 PROCEDURE — 6360000002 HC RX W HCPCS: Performed by: SURGERY

## 2018-09-07 PROCEDURE — 97530 THERAPEUTIC ACTIVITIES: CPT

## 2018-09-07 PROCEDURE — 36415 COLL VENOUS BLD VENIPUNCTURE: CPT

## 2018-09-07 PROCEDURE — G8987 SELF CARE CURRENT STATUS: HCPCS

## 2018-09-07 RX ORDER — FAMOTIDINE 20 MG/1
20 TABLET, FILM COATED ORAL 2 TIMES DAILY
Status: DISCONTINUED | OUTPATIENT
Start: 2018-09-07 | End: 2018-09-10 | Stop reason: HOSPADM

## 2018-09-07 RX ORDER — LOSARTAN POTASSIUM 25 MG/1
25 TABLET ORAL DAILY
Status: DISCONTINUED | OUTPATIENT
Start: 2018-09-07 | End: 2018-09-10 | Stop reason: HOSPADM

## 2018-09-07 RX ADMIN — ONDANSETRON HYDROCHLORIDE 4 MG: 2 INJECTION, SOLUTION INTRAMUSCULAR; INTRAVENOUS at 04:29

## 2018-09-07 RX ADMIN — Medication: at 18:36

## 2018-09-07 RX ADMIN — SODIUM CHLORIDE: 9 INJECTION, SOLUTION INTRAVENOUS at 06:35

## 2018-09-07 RX ADMIN — HYDROCODONE BITARTRATE AND ACETAMINOPHEN 2 TABLET: 5; 325 TABLET ORAL at 18:40

## 2018-09-07 RX ADMIN — MUPIROCIN: 20 OINTMENT TOPICAL at 22:31

## 2018-09-07 RX ADMIN — SODIUM CHLORIDE, PRESERVATIVE FREE 10 ML: 5 INJECTION INTRAVENOUS at 08:00

## 2018-09-07 RX ADMIN — ONDANSETRON HYDROCHLORIDE 4 MG: 2 INJECTION, SOLUTION INTRAMUSCULAR; INTRAVENOUS at 21:40

## 2018-09-07 RX ADMIN — SODIUM CHLORIDE, PRESERVATIVE FREE 10 ML: 5 INJECTION INTRAVENOUS at 22:31

## 2018-09-07 RX ADMIN — ONDANSETRON HYDROCHLORIDE 4 MG: 2 INJECTION, SOLUTION INTRAMUSCULAR; INTRAVENOUS at 13:04

## 2018-09-07 NOTE — PROGRESS NOTES
assistance  Rolling to Right: Moderate assistance  Supine to Sit: Moderate assistance (performs supine<>sit X6 )  Sit to Supine: Moderate assistance  Scooting: Maximal assistance  Transfers  Sit to Stand: Dependent/Total (max Ax2)  Stand to sit: Dependent/Total (max AX2)  Ambulation  Ambulation?: No (pt attempts to take a few side steps at EOB but unable to clear feet from floor)     Balance  Sitting - Static: Poor  Sitting - Dynamic: Poor  Standing - Static: Poor  Comments: pt sat EOB X6, sitting EOB <2-3 mins with each sit, forward flexed leaning over bed with mod A to support patient; pt with decreased safety awareness      AROM RLE (degrees)  RLE AROM: WFL  AROM LLE (degrees)  LLE AROM : WFL  Strength RLE  Comment: pt unable to follow commands to perform MMT  Strength LLE  Comment: pt unable to follow commands to perform MMT                 Assessment   Body structures, Functions, Activity limitations: Decreased functional mobility ; Decreased balance;Decreased strength;Decreased endurance  Assessment: PT RE-EVAL 9/07. Pt is s/p LEFT CAROTID ENDARTERECTOMY 9/6/18. Pt with aphasia, confused with difficulty following commands, not conversive but able to mumble first name. Pt sigfnificantly weaker compared to last session; requires mod A to complete bed mobility and max AX2 to complete sit<>stand. Pt with poor standing balance and unable to take steps to ambulate at this time. Due to level of assist required to complete functional mobility tasks, recommend SNF.    Treatment Diagnosis: weakness  Prognosis: Fair  Decision Making: High Complexity  Patient Education: role of PT, safety with mobility  Barriers to Learning: aphasia, impaired cognition  REQUIRES PT FOLLOW UP: Yes  Activity Tolerance  Activity Tolerance: Patient limited by cognitive status     G-Code  PT G-Codes  Functional Assessment Tool Used: AM-PAC  Score: 8  Functional Limitation: Mobility: Walking and moving around  Mobility: Walking and Moving Around

## 2018-09-07 NOTE — PROGRESS NOTES
Explained to pt that she has not voided since I catheterized her this am. Informed pt that I needed to do a bladder scan and then I was going to have her sit on bedpan to try and urinate. I instructed that if she has > 200 cc of urine in her bladder and she is unable to urinate that I would place a catheter to stay in her bladder to drain urine per MD order. Pt got very agitated and shakey and sd \"why do you have to make me pee all the time. I dont want you to do that again. \" Yvonne Johnson

## 2018-09-07 NOTE — CARE COORDINATION
Chart review completed. Patient a 67year old female, admitted for acute CVA. Hospital day 4, currently in ICU level of care following carotid endarterectomy. Discharge plans are to return home with significant other and [facility]. Patient will eventually have port placed and begin Chemotherapy for lung CA. Patient familiar to writer from previous admissions. Patient significant other provides majority of care. CM will continue with above plan for [facility]. Please advise should any needs arise.   Jefferson Singh RN

## 2018-09-07 NOTE — OP NOTE
At the end of procedure, sponge, needle  and instrument counts were correct. The patient was extubated in the  operating room, she is rather lethargic, but moving all extremities and  transferred to the recovery area in stable condition.         Arielle Sánchez MD    D: 09/06/2018 18:05:43       T: 09/06/2018 18:07:18     [email or web address]  Job#: [id number]     Doc#: [id number]    CC:

## 2018-09-07 NOTE — PROGRESS NOTES
Hospitalist Progress Note      PCP: Richar Martinez MD    Date of Admission: 9/2/2018    Chief Complaint: confusion    Hospital Course: reviewed H and P    Subjective: Pt post op from [facility]. Notes she has some pain but overall feels fine. Remains confused at times. Medications:  Reviewed    Infusion Medications    sodium chloride 75 mL/hr at 09/06/18 1557    nitroGLYCERIN      phenylephrine (CINDY-SYNEPHRINE) 50mg/250mL infusion Stopped (09/06/18 1923)    nitroGLYCERIN Stopped (09/06/18 1425)     Scheduled Medications    [START ON 9/7/2018] clopidogrel  75 mg Oral Daily    [START ON 9/7/2018] famotidine  20 mg Oral Daily    mupirocin   Nasal BID    aspirin  81 mg Oral Daily    vitamin D  2,000 Units Oral Daily    citalopram  20 mg Oral Daily    gabapentin  300 mg Oral QPM    losartan  50 mg Oral Daily    therapeutic multivitamin-minerals  1 tablet Oral Daily    vitamin B-12  1,000 mcg Oral Daily    sodium chloride flush  10 mL Intravenous 2 times per day    atorvastatin  20 mg Oral Nightly    oxybutynin  5 mg Oral BID    docusate sodium  100 mg Oral BID    polyethylene glycol  17 g Oral Daily     PRN Meds: acetaminophen, HYDROcodone 5 mg - acetaminophen **OR** HYDROcodone 5 mg - acetaminophen, ondansetron, cloNIDine, hydrALAZINE, nitroGLYCERIN, phenylephrine (CINDY-SYNEPHRINE) 50mg/250mL infusion, albuterol sulfate HFA, sodium chloride flush, magnesium hydroxide, labetalol, traMADol      Intake/Output Summary (Last 24 hours) at 09/06/18 2118  Last data filed at 09/06/18 2040   Gross per 24 hour   Intake             2700 ml   Output              512 ml   Net             2188 ml       Physical Exam Performed:    BP (!) 105/43   Pulse 69   Temp 97.2 °F (36.2 °C) (Temporal)   Resp 12   Ht 5' 5\" (1.651 m)   Wt 120 lb (54.4 kg)   SpO2 100%   BMI 19.97 kg/m²      General appearance:  NAD. HEENT:  Dressing over left neck. Respiratory:  Normal respiratory effort.    Cardiovascular:  Regular rate exophytic 1.5 cm nodule. Nonemergent thyroid ultrasound   recommended. CTA NECK W CONTRAST   Final Result   Left ICA proximal stenosis of approximately 90% by NASCET criteria. Right ICA proximal stenosis of approximately 50% by NASCET criteria. Right vertebral artery origin severe focal stenosis, which remains patent   distally. Bilateral vertebral arteries demonstrate scattered atheromatous   plaques without additional significant stenosis. Intracranially, there are carotid siphon calcifications without significant   stenosis of the anterior or posterior circulations. Left upper lobe nodular airspace disease. Minimal right upper lobe   ground-glass opacities. Findings better characterized on PET-CT 07/05/2018. Differential diagnosis includes infection, posttreatment change and   malignancy. Partially calcified mediastinal lymphadenopathy. Right thyroid lobe exophytic 1.5 cm nodule. Nonemergent thyroid ultrasound   recommended. VL DUP CAROTID BILATERAL   Final Result      CT HEAD WO CONTRAST   Final Result   No acute intracranial abnormality. MRI BRAIN WO CONTRAST   Final Result   1. Multiple scattered punctate acute infarcts are seen throughout the left   cerebral hemisphere. These may be embolic in nature. 2. There is no significant mass effect or midline shift. 3. Mild-to-moderate global parenchymal volume loss with chronic microvascular   ischemic change. These results were sent to the [facility] [address] ([address] [place] [address]) on 9/4/2018   at 2:38 pm to be communicated to the referring/covering health care   provider/office. XR CHEST STANDARD (2 VW)   Final Result   No acute disease. CT HEAD WO CONTRAST   Final Result   No acute intracranial abnormality. Stable left caudate lobe lacunar stroke.       White matter hypoattenuation described is typical of microvascular ischemic   disease or as sequela of dysmyelinating/demyelinating processes. Assessment/Plan:    Active Hospital Problems    Diagnosis Date Noted    Stenosis of internal carotid artery with cerebral infarction, left (HCC) [I63.232]     Moderate malnutrition ([facility] [E44.0] 09/04/2018    Acute cerebrovascular accident (CVA) ([facility] [I63.9] 09/03/2018    Acute right-sided weakness [M62.89]     HTN (hypertension), benign [I10]     S/P thoracotomy [Z98.890]     Malignant neoplasm of bronchus of left upper lobe ([facility] [C34.12] 08/13/2018    Coronary artery disease involving native coronary artery of native heart without angina pectoris [I25.10] 08/04/2017    COPD, severe ([facility] [J44.9] 05/26/2015    Hyperlipidemia, familial, high LDL [E78.4]      Acute left hemispheric cerebrovascular accident - embolic vs thromboembolic. CTA, ECHO reviewed. CTA with left ICA stenosis. Vascular surgery consulted and following - now S/P CEA. PT/OT. LDL 81. Neurology following. Continue ASA, statin, plavix. Left ICA stenosis - S/P CEA on 9/6/18. Vascular surgery following. Metastatic Adenocarcinoma - wedge procedure with Dr. Remy Uribe to [facility]. Plan for port insertion on 9/6 however needs rescheduled due to above. Can start chemo in 2 weeks. COPD - stable. Albuterol prn. Encephalopathy - post op versus toxic. Neuro following. H/O EtOH abuse - no evidence of withdrawal. Poor mentation likely related to chronic alcohol use. DVT Prophylaxis: lovenox  Diet: Dietary Nutrition Supplements: Standard High Calorie Oral Supplement  DIET CLEAR LIQUID;  Code Status: Full Code    PT/OT Eval Status: attempted to see pt - re-order once ok post-op    Dispo - ICU care. 2-3 days.     Luis F Delcid MD

## 2018-09-07 NOTE — PROGRESS NOTES
Occupational Therapy   Occupational Therapy Initial Assessment and Treatment Note  Date: 2018   Patient Name: David Dueñas  MRN: [id number]     : 1945    Date of Service: 2018    Discharge Recommendations:  [address]  OT Equipment Recommendations  Other: defer to next level of care      Patient Diagnosis(es): The encounter diagnosis was Acute right-sided weakness. has a past medical history of Acute cerebrovascular accident (CVA) ([address] Alcohol abuse; Asthma; COPD, severe ([address] Coronary artery disease involving native coronary artery of native heart without angina pectoris; Essential hypertension; GERD (gastroesophageal reflux disease); Hyperlipidemia; Hypertension; Injury of esophagus; Insomnia; Kidney stone; Malignant neoplasm of bronchus of left upper lobe ([address] Mass of upper lobe of left lung; Neuropathy; Osteoarthritis; Prolonged emergence from general anesthesia; Stenosis of internal carotid artery with cerebral infarction, left ([address] and Urinary incontinence. has a past surgical history that includes Lithotripsy; Colonoscopy (2013); Hysterectomy, total abdominal; bronchoscopy (2017); Colonoscopy (2010); Thoracoscopy (Left, 2018); pr office/outpt visit,procedure only (Left, 2018); and pr thromboendartectmy neck,neck incis (Left, 2018).            Restrictions  Restrictions/Precautions  Restrictions/Precautions: Fall Risk, General Precautions (high)  Position Activity Restriction  Other position/activity restrictions: up with assist, avasys, receptive and expressive aphasia unable to communicate basic needs, impulsive    Subjective   General  Chart Reviewed: Yes  Patient assessed for rehabilitation services?: Yes  Family / Caregiver Present: No  Referring Practitioner: Jaswinder Rojas  Diagnosis: CVA  Subjective  Subjective: Pt supine in bed upon arrival and agreeable for session  General Comment  Comments: RN approval prior to session  Pain Assessment  Patient Currently in Pain: Denies  Pain Assessment: 0-10  Pain Level: 7  Response to Pain Intervention: Asleep with RR greater than 10  Oxygen Therapy  SpO2: 92 %  Pulse Oximeter Device Mode: Intermittent  Pulse Oximeter Device Location: Finger  O2 Device: None (Room air)  Social/Functional History  Social/Functional History  Lives With:  (Isaiah Garay (roomate there 24/7))  Type of Home: House  Home Layout: One level  Home Access: Stairs to enter without rails  Entrance Stairs - Number of Steps: 1  Bathroom Shower/Tub: Walk-in shower, Shower chair without back  Bathroom Toilet: Handicap height  Bathroom Equipment: Grab bars in shower, Grab bars around toilet  Bathroom Accessibility: Accessible  Home Equipment: Rolling walker, Wheelchair-manual, Lift chair  ADL Assistance: Independent  Homemaking Assistance: Needs assistance (roomate does the cleaning and cooking )  Ambulation Assistance: Independent (with RW)  Transfer Assistance: Independent  Additional Comments: information based on prior admission this month, pt unable to answer any social functioning questions       Objective        Orientation  Overall Orientation Status: Impaired  Orientation Level:  (pt only able to communicate first name, unable to answer any other A&O questions)  Observation/Palpation  Posture: Fair (EOB, decreased trunk control noted)  Balance  Sitting Balance: Maximum assistance (flucuating between CGA and Max A, poor trunk control)  Standing Balance: Dependent/Total (Max A x2 with HHA)  Standing Balance  Time: x30 sec  Activity: static stand EOB  Sit to stand: Dependent/Total  Stand to sit: Dependent/Total  Comment: Max A x2 HHA, unable to follow commands  ADL  LE Dressing: Dependent/Total (to don socks )  Additional Comments: pt unable to answer yes/no questions  Tone RUE  RUE Tone: Normotonic  Tone LUE  LUE Tone: Normotonic  Coordination  Movements Are Fluid And Coordinated: No  Coordination and Movement description: Performance deficits / Impairments: Decreased functional mobility ; Decreased ADL status; Decreased safe awareness;Decreased cognition;Decreased balance;Decreased endurance;Decreased coordination  Assessment: OT eval complete. Pt presents with the above deficits impacting independence with ADLs and functional t/fs. Recommend continued skilled OT to increase independence and safety prior to return home. Recommend SNF DC d/t increased amount of care and therapiy required. Pt requires Ax2 d/t decreased cog and communication skills as well as decreased physical ability for ADLs and functional t/fs. Continue POC  Prognosis: Good  Decision Making: High Complexity  Patient Education: Role of OT, safe t/fs, bed mobility, ADLs, DC recommendations, call light  REQUIRES OT FOLLOW UP: Yes  Activity Tolerance  Activity Tolerance: Treatment limited secondary to decreased cognition  Activity Tolerance: Increased time required throughout, pt unable to communicate d/t expressive and receptive aphasia, pt impulsive  Safety Devices  Safety Devices in place: Yes  Type of devices: All fall risk precautions in place; Bed alarm in place;Call light within reach; Patient at risk for falls;Nurse notified; Left in bed         Plan   Plan  Times per week: 4-5x/wk    G-Code  OT G-codes  Functional Assessment Tool Used: [facility]  Score: 7  Functional Limitation: Self care  Self Care Current Status (): At least 80 percent but less than 100 percent impaired, limited or restricted  Self Care Goal Status (): At least 60 percent but less than 80 percent impaired, limited or restricted    AM-PAC Score   AM-PAC Inpatient Daily Activity Raw Score: 7  AM-PAC Inpatient ADL T-Scale Score : 20.13  ADL Inpatient CMS 0-100% Score: 92.44  ADL Inpatient CMS G-Code Modifier : CM    Goals  Short term goals  Time Frame for Short term goals: -2 weeks  Short term goal 1: Pt will complete functional t/fs with Max A x1.     Short term goal 2: Pt will complete bed

## 2018-09-07 NOTE — PROGRESS NOTES
4 Eyes Skin Assessment     The patient is being assess for   Shift Handoff    I agree that 2 RN's have performed a thorough Head to Toe Skin Assessment on the patient. ALL assessment sites listed below have been assessed. Areas assessed by both nurses:   [x]   Head, Face, and Ears   [x]   Shoulders, Back, and Chest, Abdomen  [x]   Arms, Elbows, and Hands   [x]   Coccyx, Sacrum, and Ischium  [x]   Legs, Feet, and Heels        Jar/*    **SHARE this note so that the co-signing nurse is able to place an eSignature**    Co-signer eSignature: Electronically signed by Clyde Cohen RN on 9/7/18 at 10:29 PM    Does the Patient have Skin Breakdown?   No          Scott Prevention initiated:  Yes   Wound Care Orders initiated: Yes      [id number]  nurse consulted for Pressure Injury (Stage 3,4, Unstageable, DTI, NWPT, Complex wounds)and New or Established Ostomies:  NA      Primary Nurse eSignature: Electronically signed by Rafat Bolanos RN on 9/7/18 at 7:07 PM

## 2018-09-08 LAB
ESTIMATED AVERAGE GLUCOSE: 114 MG/DL
HBA1C MFR BLD: 5.6 %

## 2018-09-08 PROCEDURE — 6370000000 HC RX 637 (ALT 250 FOR IP): Performed by: SURGERY

## 2018-09-08 PROCEDURE — 6370000000 HC RX 637 (ALT 250 FOR IP): Performed by: INTERNAL MEDICINE

## 2018-09-08 PROCEDURE — 2060000000 HC ICU INTERMEDIATE R&B

## 2018-09-08 PROCEDURE — 6370000000 HC RX 637 (ALT 250 FOR IP): Performed by: NURSE PRACTITIONER

## 2018-09-08 PROCEDURE — 2580000003 HC RX 258: Performed by: INTERNAL MEDICINE

## 2018-09-08 PROCEDURE — 2580000003 HC RX 258: Performed by: NURSE PRACTITIONER

## 2018-09-08 PROCEDURE — 99232 SBSQ HOSP IP/OBS MODERATE 35: CPT | Performed by: PSYCHIATRY & NEUROLOGY

## 2018-09-08 PROCEDURE — 92526 ORAL FUNCTION THERAPY: CPT

## 2018-09-08 PROCEDURE — 2500000003 HC RX 250 WO HCPCS: Performed by: NURSE PRACTITIONER

## 2018-09-08 RX ORDER — SODIUM CHLORIDE, SODIUM LACTATE, POTASSIUM CHLORIDE, CALCIUM CHLORIDE 600; 310; 30; 20 MG/100ML; MG/100ML; MG/100ML; MG/100ML
INJECTION, SOLUTION INTRAVENOUS CONTINUOUS
Status: DISCONTINUED | OUTPATIENT
Start: 2018-09-08 | End: 2018-09-09

## 2018-09-08 RX ADMIN — LOSARTAN POTASSIUM 25 MG: 25 TABLET, FILM COATED ORAL at 09:58

## 2018-09-08 RX ADMIN — SODIUM CHLORIDE, PRESERVATIVE FREE 10 ML: 5 INJECTION INTRAVENOUS at 10:00

## 2018-09-08 RX ADMIN — ASPIRIN 81 MG 81 MG: 81 TABLET ORAL at 09:58

## 2018-09-08 RX ADMIN — TRAMADOL HYDROCHLORIDE 25 MG: 50 TABLET, FILM COATED ORAL at 14:47

## 2018-09-08 RX ADMIN — OXYBUTYNIN CHLORIDE 5 MG: 5 TABLET ORAL at 09:58

## 2018-09-08 RX ADMIN — POLYETHYLENE GLYCOL 3350 17 G: 17 POWDER, FOR SOLUTION ORAL at 09:59

## 2018-09-08 RX ADMIN — CLONIDINE HYDROCHLORIDE 0.1 MG: 0.1 TABLET ORAL at 09:58

## 2018-09-08 RX ADMIN — DOCUSATE SODIUM 100 MG: 100 CAPSULE, LIQUID FILLED ORAL at 09:58

## 2018-09-08 RX ADMIN — TRAMADOL HYDROCHLORIDE 25 MG: 50 TABLET, FILM COATED ORAL at 09:58

## 2018-09-08 RX ADMIN — HYDROCODONE BITARTRATE AND ACETAMINOPHEN 1 TABLET: 5; 325 TABLET ORAL at 18:31

## 2018-09-08 RX ADMIN — VITAMIN D, TAB 1000IU (100/BT) 2000 UNITS: 25 TAB at 10:15

## 2018-09-08 RX ADMIN — OXYBUTYNIN CHLORIDE 5 MG: 5 TABLET ORAL at 21:29

## 2018-09-08 RX ADMIN — ATORVASTATIN CALCIUM 20 MG: 10 TABLET, FILM COATED ORAL at 21:29

## 2018-09-08 RX ADMIN — SODIUM CHLORIDE, PRESERVATIVE FREE 10 ML: 5 INJECTION INTRAVENOUS at 10:19

## 2018-09-08 RX ADMIN — LABETALOL HYDROCHLORIDE 10 MG: 5 INJECTION, SOLUTION INTRAVENOUS at 10:18

## 2018-09-08 RX ADMIN — SODIUM CHLORIDE, POTASSIUM CHLORIDE, SODIUM LACTATE AND CALCIUM CHLORIDE: 600; 310; 30; 20 INJECTION, SOLUTION INTRAVENOUS at 14:15

## 2018-09-08 RX ADMIN — CITALOPRAM HYDROBROMIDE 20 MG: 20 TABLET ORAL at 09:58

## 2018-09-08 RX ADMIN — FAMOTIDINE 20 MG: 20 TABLET ORAL at 09:58

## 2018-09-08 RX ADMIN — CYANOCOBALAMIN TAB 500 MCG 1000 MCG: 500 TAB at 09:57

## 2018-09-08 RX ADMIN — GABAPENTIN 300 MG: 300 CAPSULE ORAL at 18:33

## 2018-09-08 RX ADMIN — Medication 1 TABLET: at 09:58

## 2018-09-08 RX ADMIN — DOCUSATE SODIUM 100 MG: 100 CAPSULE, LIQUID FILLED ORAL at 21:29

## 2018-09-08 RX ADMIN — SODIUM CHLORIDE, PRESERVATIVE FREE 10 ML: 5 INJECTION INTRAVENOUS at 21:29

## 2018-09-08 RX ADMIN — FAMOTIDINE 20 MG: 20 TABLET ORAL at 21:29

## 2018-09-08 RX ADMIN — CLOPIDOGREL BISULFATE 75 MG: 75 TABLET ORAL at 09:58

## 2018-09-08 ASSESSMENT — PAIN SCALES - GENERAL
PAINLEVEL_OUTOF10: 4
PAINLEVEL_OUTOF10: 6
PAINLEVEL_OUTOF10: 5
PAINLEVEL_OUTOF10: 5

## 2018-09-08 NOTE — PROGRESS NOTES
Speech Language Pathology  Facility/Department: [facility] C4 PCU  Dysphagia Daily Treatment Note    NAME: Asuncion Varela  :   MRN: [id number]    Patient Diagnosis(es):   Patient Active Problem List    Diagnosis Date Noted    Stenosis of internal carotid artery with cerebral infarction, left ([address]     Moderate malnutrition ([address] 2018    Acute cerebrovascular accident (CVA) ([address] 2018    Acute right-sided weakness     HTN (hypertension), benign     Acute postoperative pain     S/P thoracotomy     Malignant neoplasm of bronchus of left upper lobe ([address] 2018    Encephalopathy, metabolic     Clavicle fracture 2018    Alcohol withdrawal syndrome with complication ([address]     Centrilobular emphysema ([address]     Multiple pulmonary nodules     Mediastinal lymphadenopathy     Coronary artery disease involving native coronary artery of native heart without angina pectoris 2017    Adjustment reaction with anxiety and depression 2017    Lung nodule 2017    Seasonal allergies 2015    COPD, severe ([address] 2015    Left hip pain 2015    Mixed incontinence 2012    Gastroesophageal reflux disease without esophagitis 2012    Hyperlipidemia, familial, high LDL     Primary osteoarthritis of both knees     Primary insomnia      Allergies: Allergies   Allergen Reactions    Codeine Itching    Morphine Itching and Other (See Comments)     On file at [facility]     Subjective:Pt reported to have eaten very little this date; nodded yes, she would try some PO with SLP     Pain: no    Current Diet:Regular solid (choosing softer) / Thin liquids    Diet Tolerance:  Patient tolerating current diet level without signs/symptoms of penetration / aspiration. P.O. Trials: Thin   x x6 small sips of ice water; x2 oz ice cream   Solid   x declined     Dysphagia Therapy:  Pt seen for follow-up dysphagia therapy this date.  Attempted Speech therapy tasks, as well, however pt did not verbalize at all throughout the session, nor did she follow commands. Although she was able to appropriately answer y/n questions with nodding/shaking her head. Pt was repositioned to upright, agreeable to thin liquid trials this date (ice cream and sips of water), declining solids/puree/pudding. Some facial grimacing occurred during the swallow, however pt shook her head that she was not in any pain when asked x3 across session. Pt tolerated trials of thin liquids in 5/6 trials, with delayed dry cough x1 trial. No overt s/s of aspiration or penetration with 2oz of ice cream, fed to her by SLP. Minimal interaction with SLP. Short-term Goals  Timeframe for Short-term Goals: 5 days  Goal 1: The patient will tolerate recommended diet wtihout observed clinical signs of aspiration. 09/08 delayed, dry cough x1 sips of water; no s/s of pen/asp with ice cream x2oz. Pt declined regular and puree PO trials. Goal 2: The patient/caregiver will demonstrate understanding of compensatory strategies and carryover during fx'al PO intake 100% of the time with minimal cueing. 09/08 ongoing, no evidence of learning this date  Long-term Goals  Timeframe for Long-term Goals: 7 Days  Goal 1: The patient will safely and consistently tolerate a least restrictive PO diet without difficulty or overt s/s of aspiration. 09/08 see above; ongoing assessment is recommended    Recommendations:  Continue to encourage verbal response; pt unable to participate in Speech therapy tasks this date  Regular foods (choosing softer) full assist with feeding  Thin Liquids    Patient/Family/Caregiver Education:  Educated pt regarding safe swallowing strategies    Compensatory Strategies:   Alternate solids and liquids;Eat/Feed slowly;Upright as possible for all oral intake;Remain upright for 30-45 minutes after meals;Small bites/sips     Plan:    Continued Dysphagia treatment with goals per plan of care.    Discharge Recommendations:  SNF with speech/swallowing therapy, if pt able to demonstrate increased ability to participate with therapy    If pt discharges from hospital prior to Speech/Swallowing discharge, this note serves as tx and discharge summary.      Total Treatment Time:  7201-5080, 17 min Dysphagia Tx    Omi Garcia M.A., [id number] [facility], [address] [place] [address],[address]  Speech Language Pathologist

## 2018-09-08 NOTE — PROGRESS NOTES
multivitamin-minerals  1 tablet Oral Daily    vitamin B-12  1,000 mcg Oral Daily    sodium chloride flush  10 mL Intravenous 2 times per day    atorvastatin  20 mg Oral Nightly    oxybutynin  5 mg Oral BID    docusate sodium  100 mg Oral BID    polyethylene glycol  17 g Oral Daily     PRN Meds: acetaminophen, HYDROcodone 5 mg - acetaminophen **OR** HYDROcodone 5 mg - acetaminophen, ondansetron, cloNIDine, hydrALAZINE, nitroGLYCERIN, phenylephrine (CNIDY-SYNEPHRINE) 50mg/250mL infusion, albuterol sulfate HFA, sodium chloride flush, magnesium hydroxide, labetalol, traMADol      Intake/Output Summary (Last 24 hours) at 09/08/18 1214  Last data filed at 09/08/18 1155   Gross per 24 hour   Intake              668 ml   Output              825 ml   Net             -157 ml       Physical Exam Performed:    /76   Pulse 66   Temp 98.9 °F (37.2 °C) (Oral)   Resp 16   Ht 5' 5\" (1.651 m)   Wt 132 lb 11.5 oz (60.2 kg)   SpO2 93%   BMI 22.09 kg/m²      General appearance:  NAD. HEENT:  incision over left neck c/d/i. Respiratory:  Normal respiratory effort. Cardiovascular:  Regular rate and rhythm with normal S1/S2 without murmurs, rubs or gallops. Abdomen: Soft, non-tender, non-distended with normal bowel sounds. Musculoskeletal:  No clubbing, cyanosis or edema bilaterally. Full range of motion without deformity. Skin: Skin color, texture, turgor normal.  No rashes or lesions.   Neurologic:  CN2-12 intact  Psychiatric: Confused, oriented to self only but sometimes knows she is in a hospital, severe expressive aphasia, does follow my commands  Capillary Refill: Brisk,< 3 seconds   Peripheral Pulses: +2 palpable, equal bilaterally     Labs:   Recent Labs      09/07/18   0436   WBC  8.1   HGB  9.0*   HCT  26.4*   PLT  207     Recent Labs      09/07/18   0435   NA  142   K  3.7   CL  107   CO2  24   BUN  9   CREATININE  0.6   CALCIUM  8.5     No results for input(s): AST, ALT, BILIDIR, BILITOT, ALKPHOS in the better characterized on PET-CT 07/05/2018. Differential diagnosis includes infection, posttreatment change and   malignancy. Partially calcified mediastinal lymphadenopathy. Right thyroid lobe exophytic 1.5 cm nodule. Nonemergent thyroid ultrasound   recommended. VL DUP CAROTID BILATERAL   Final Result      CT HEAD WO CONTRAST   Final Result   No acute intracranial abnormality. MRI BRAIN WO CONTRAST   Final Result   1. Multiple scattered punctate acute infarcts are seen throughout the left   cerebral hemisphere. These may be embolic in nature. 2. There is no significant mass effect or midline shift. 3. Mild-to-moderate global parenchymal volume loss with chronic microvascular   ischemic change. These results were sent to the [email or web address] [id number] ([address] [facility]) on 9/4/2018   at 2:38 pm to be communicated to the referring/covering health care   provider/office. XR CHEST STANDARD (2 VW)   Final Result   No acute disease. CT HEAD WO CONTRAST   Final Result   No acute intracranial abnormality. Stable left caudate lobe lacunar stroke. White matter hypoattenuation described is typical of microvascular ischemic   disease or as sequela of dysmyelinating/demyelinating processes.                  Assessment/Plan:    Active Hospital Problems    Diagnosis Date Noted    Stenosis of internal carotid artery with cerebral infarction, left (HCC) [I63.232]     Moderate malnutrition ([address] [E44.0] 09/04/2018    Acute cerebrovascular accident (CVA) ([address] [I63.9] 09/03/2018    Acute right-sided weakness [M62.89]     HTN (hypertension), benign [I10]     S/P thoracotomy [Z98.890]     Malignant neoplasm of bronchus of left upper lobe ([address] [C34.12] 08/13/2018    Coronary artery disease involving native coronary artery of native heart without angina pectoris [I25.10] 08/04/2017    COPD, severe ([address] [J44.9] 05/26/2015    Hyperlipidemia, familial, high LDL [E78.4] Acute left hemispheric cerebrovascular accident - embolic vs thromboembolic. ECHO unremarkable. CTA with left ICA stenosis. Vascular surgery consulted - now S/P CEA. PT/OT. LDL 81. Neurology following. Continue ASA, statin, plavix. Patient with severe waxing and waning expressive>receptive aphasia. Left ICA stenosis - S/P CEA on 9/6/18. Metastatic Adenocarcinoma - wedge procedure with Dr. Odonnell Books to [facility]. Plan was for port insertion on 9/6 however needs rescheduled due to above issues. Can start chemo in 2 weeks. Acute urinary retention - couldn't void on 9/7, was getting agitated, then straight catheterized for 900 ml and she felt much better and clamed down. This happened again later that night and sanchez was placed. Will need voiding trial in a few days, could potentially happen at SNF. If she fails voiding trial then she will need to f/u with outpatient urology. COPD - stable. Albuterol prn. Encephalopathy - perhaps due to CVA. Supportive care. H/O EtOH abuse - no evidence of withdrawal.  Underlying poor mentation likely related to chronic alcohol use. Incidental finding on CT:  Right thyroid lobe exophytic 1.5 cm nodule.  Nonemergent outpatient thyroid ultrasound   recommended. DVT Prophylaxis: SCDs  Diet: Dietary Nutrition Supplements: Standard High Calorie Oral Supplement  DIET DYSPHAGIA II MECHANICALLY ALTERED;  Code Status: Full Code    PT/OT Eval Status: rec'd SNF    Dispo - She came from home, lives with significant other. Perhaps medically ready 9/9 pending neurology input. CM consulted.        Deena Blake MD

## 2018-09-08 NOTE — PROGRESS NOTES
Assumed care of patient. Shift assessment complete and documented on flowsheets. VSS. Four eyes skin assessment and MAR handoff completed with previous RN. Repositioned for comfort. Call light and over bed table within reach. Will continue to monitor.

## 2018-09-08 NOTE — PROGRESS NOTES
Pt alert c/o \" headache\" had PRN PO medication, pt kept comfortable. Room temp adjusted, Shift assessment done. Safety/fall prevention maintained. Avasys monitor in room, Personal belongings and call light within reach. Will continue to monitor.  EDGAR

## 2018-09-08 NOTE — PROGRESS NOTES
Lei Ye  Neurology Follow-up  [facility] Neurology    Date of Service: 9/8/2018    Subjective:   CC: Follow up today regarding: acute stroke    Events noted. Chart and lab reviewed. The patient continues to be the same. Waxing and waning. The same aphasia. No new weakness. Repeat CT head NC from yesterday showed no acute stroke. No HA, new weakness, CP, SOB or dysphagia. Other ROS was negative.      Past Medical History:   Diagnosis Date    Acute cerebrovascular accident (CVA) ([address] 9/3/2018    Alcohol abuse 5/22/2018    Asthma     COPD, severe ([address] 5/26/2015    Coronary artery disease involving native coronary artery of native heart without angina pectoris 8/4/2017    Essential hypertension     GERD (gastroesophageal reflux disease)     Hyperlipidemia     Hypertension     Injury of esophagus     inflamed    Insomnia     Kidney stone 1510-3295    Malignant ne[facility] of [facility]us of left upper lobe [facility] 8/13/2018    Mass of upper lobe of left lung 08/2017    Neuropathy 11/13/2014    Osteoarthritis     Prolonged emergence from general anesthesia     Stenosis of internal carotid artery with cerebral infarction, left (HCC)     Urinary incontinence      Current Facility-Administered Medications   Medication Dose Route Frequency Provider Last Rate Last Dose    lactated ringers infusion   Intravenous Continuous Rupert Rodriguez MD        famotidine (PEPCID) tablet 20 mg  20 mg Oral BID Rupert Rodriguez MD   20 mg at 09/08/18 0958    losartan (COZAAR) tablet 25 mg  25 mg Oral Daily Rupert Rodriguez MD   25 mg at 09/08/18 0958    acetaminophen (TYLENOL) tablet 650 mg  650 mg Oral Q4H PRANTONY Wood MD        HYDROcodone-acetaminophen [facility]) 5-325 MG per tablet 1 tablet  1 tablet Oral Q4H PRANTONY Wood MD        Or    HYDROcodone-acetaminophen [facility]) 5-325 MG per tablet 2 tablet  2 tablet Oral Q4H PRANTONY Wood MD   2 tablet at 09/07/18 1840    ondansetron [facility] injection 4 mg  4 mg Intravenous Q6H PRN Dion Samano MD   4 mg at 09/07/18 2140    cloNIDine (CATAPRES) tablet 0.1 mg  0.1 mg Oral Q2H PRN Dion Samano MD   0.1 mg at 09/08/18 7257    hydrALAZINE (APRESOLINE) injection 10 mg  10 mg Intravenous Q1H PRN Dion Samano MD        nitroGLYCERIN 50 mg in dextrose 5% 250 mL infusion  5 mcg/min Intravenous Continuous PRN Dion Samano MD        phenylephrine (CINDY-SYNEPHRINE) 50 mg in dextrose 5 % 250 mL infusion  50 mcg/min Intravenous Continuous PRN Dion Samano MD   Stopped at 09/06/18 1923    clopidogrel (PLAVIX) tablet 75 mg  75 mg Oral Daily Dion Samnao MD   75 mg at 09/08/18 0958    albuterol sulfate  (90 Base) MCG/ACT inhaler 2 puff  2 puff Inhalation Q6H PRN Thor Hawkins MD        aspirin chewable tablet 81 mg  81 mg Oral Daily Vannie Eliana, APRN - CNP   81 mg at 09/08/18 1054    vitamin D (CHOLECALCIFEROL) tablet 2,000 Units  2,000 Units Oral Daily Vannie Eliana, APRN - CNP   2,000 Units at 09/08/18 1015    citalopram (CELEXA) tablet 20 mg  20 mg Oral Daily Vannie Eliana, APRN - CNP   20 mg at 09/08/18 6475    gabapentin (NEURONTIN) capsule 300 mg  300 mg Oral QPM Vannie Eliana, APRN - CNP   Stopped at 09/06/18 1800    therapeutic multivitamin-minerals 1 tablet  1 tablet Oral Daily Vannie Eliana, APRN - CNP   1 tablet at 09/08/18 5396    vitamin B-12 (CYANOCOBALAMIN) tablet 1,000 mcg  1,000 mcg Oral Daily Vannie Eliana, APRN - CNP   1,000 mcg at 09/08/18 0957    sodium chloride flush 0.9 % injection 10 mL  10 mL Intravenous 2 times per day Vannie Eliana, APRN - CNP   10 mL at 09/08/18 1000    sodium chloride flush 0.9 % injection 10 mL  10 mL Intravenous PRN Vannie Eliana, APRN - CNP   10 mL at 09/08/18 1019    magnesium hydroxide (MILK OF MAGNESIA) 400 MG/5ML suspension 30 mL  30 mL Oral Daily PRN SORAIDA Paige CNP   30 mL at 09/05/18 0841    labetalol 120/80  [facility]  [facility]  DW family  Will follow Monday              Maria R Seaman MD   [phone]      This dictation was generated by voice recognition computer software. Although all attempts are made to edit the dictation for accuracy, there may be errors in the transcription that are not intended.

## 2018-09-09 LAB
ANION GAP SERPL CALCULATED.3IONS-SCNC: 10 MMOL/L (ref 3–16)
BUN BLDV-MCNC: 9 MG/DL (ref 7–20)
CALCIUM SERPL-MCNC: 9 MG/DL (ref 8.3–10.6)
CHLORIDE BLD-SCNC: 101 MMOL/L (ref 99–110)
CO2: 30 MMOL/L (ref 21–32)
CREAT SERPL-MCNC: 0.8 MG/DL (ref 0.6–1.2)
GFR AFRICAN AMERICAN: >60
GFR NON-AFRICAN AMERICAN: >60
GLUCOSE BLD-MCNC: 103 MG/DL (ref 70–99)
HCT VFR BLD CALC: 29.5 % (ref 36–48)
HEMOGLOBIN: 10 G/DL (ref 12–16)
MAGNESIUM: 1.9 MG/DL (ref 1.8–2.4)
MCH RBC QN AUTO: 29.7 PG (ref 26–34)
MCHC RBC AUTO-ENTMCNC: 33.9 G/DL (ref 31–36)
MCV RBC AUTO: 87.6 FL (ref 80–100)
PDW BLD-RTO: 12.3 % (ref 12.4–15.4)
PLATELET # BLD: 251 K/UL (ref 135–450)
PMV BLD AUTO: 8.6 FL (ref 5–10.5)
POTASSIUM REFLEX MAGNESIUM: 3.5 MMOL/L (ref 3.5–5.1)
RBC # BLD: 3.36 M/UL (ref 4–5.2)
SODIUM BLD-SCNC: 141 MMOL/L (ref 136–145)
WBC # BLD: 10 K/UL (ref 4–11)

## 2018-09-09 PROCEDURE — 2060000000 HC ICU INTERMEDIATE R&B

## 2018-09-09 PROCEDURE — 80048 BASIC METABOLIC PNL TOTAL CA: CPT

## 2018-09-09 PROCEDURE — 2580000003 HC RX 258: Performed by: INTERNAL MEDICINE

## 2018-09-09 PROCEDURE — 6370000000 HC RX 637 (ALT 250 FOR IP): Performed by: NURSE PRACTITIONER

## 2018-09-09 PROCEDURE — 36415 COLL VENOUS BLD VENIPUNCTURE: CPT

## 2018-09-09 PROCEDURE — 83735 ASSAY OF MAGNESIUM: CPT

## 2018-09-09 PROCEDURE — 2580000003 HC RX 258: Performed by: NURSE PRACTITIONER

## 2018-09-09 PROCEDURE — 85027 COMPLETE CBC AUTOMATED: CPT

## 2018-09-09 PROCEDURE — 6370000000 HC RX 637 (ALT 250 FOR IP): Performed by: INTERNAL MEDICINE

## 2018-09-09 PROCEDURE — 6370000000 HC RX 637 (ALT 250 FOR IP): Performed by: SURGERY

## 2018-09-09 RX ORDER — ONDANSETRON 2 MG/ML
4 INJECTION INTRAMUSCULAR; INTRAVENOUS EVERY 6 HOURS PRN
Status: DISCONTINUED | OUTPATIENT
Start: 2018-09-09 | End: 2018-09-10 | Stop reason: HOSPADM

## 2018-09-09 RX ORDER — ACETAMINOPHEN 325 MG/1
650 TABLET ORAL EVERY 4 HOURS PRN
Status: DISCONTINUED | OUTPATIENT
Start: 2018-09-09 | End: 2018-09-10 | Stop reason: HOSPADM

## 2018-09-09 RX ORDER — CHOLECALCIFEROL (VITAMIN D3) 10 MCG
1 TABLET ORAL DAILY
Status: DISCONTINUED | OUTPATIENT
Start: 2018-09-09 | End: 2018-09-10 | Stop reason: HOSPADM

## 2018-09-09 RX ADMIN — CLOPIDOGREL BISULFATE 75 MG: 75 TABLET ORAL at 09:05

## 2018-09-09 RX ADMIN — CITALOPRAM HYDROBROMIDE 20 MG: 20 TABLET ORAL at 09:06

## 2018-09-09 RX ADMIN — DOCUSATE SODIUM 100 MG: 100 CAPSULE, LIQUID FILLED ORAL at 20:52

## 2018-09-09 RX ADMIN — VITAMIN D, TAB 1000IU (100/BT) 2000 UNITS: 25 TAB at 09:05

## 2018-09-09 RX ADMIN — OXYBUTYNIN CHLORIDE 5 MG: 5 TABLET ORAL at 09:06

## 2018-09-09 RX ADMIN — DOCUSATE SODIUM 100 MG: 100 CAPSULE, LIQUID FILLED ORAL at 12:39

## 2018-09-09 RX ADMIN — HYDROCODONE BITARTRATE AND ACETAMINOPHEN 2 TABLET: 5; 325 TABLET ORAL at 17:36

## 2018-09-09 RX ADMIN — POLYETHYLENE GLYCOL 3350 17 G: 17 POWDER, FOR SOLUTION ORAL at 09:04

## 2018-09-09 RX ADMIN — ASPIRIN 81 MG 81 MG: 81 TABLET ORAL at 09:07

## 2018-09-09 RX ADMIN — SODIUM CHLORIDE, POTASSIUM CHLORIDE, SODIUM LACTATE AND CALCIUM CHLORIDE: 600; 310; 30; 20 INJECTION, SOLUTION INTRAVENOUS at 09:06

## 2018-09-09 RX ADMIN — GABAPENTIN 300 MG: 300 CAPSULE ORAL at 17:37

## 2018-09-09 RX ADMIN — OXYBUTYNIN CHLORIDE 5 MG: 5 TABLET ORAL at 20:52

## 2018-09-09 RX ADMIN — HYDROCODONE BITARTRATE AND ACETAMINOPHEN 2 TABLET: 5; 325 TABLET ORAL at 06:41

## 2018-09-09 RX ADMIN — LOSARTAN POTASSIUM 25 MG: 25 TABLET, FILM COATED ORAL at 09:05

## 2018-09-09 RX ADMIN — FAMOTIDINE 20 MG: 20 TABLET ORAL at 09:05

## 2018-09-09 RX ADMIN — ATORVASTATIN CALCIUM 20 MG: 10 TABLET, FILM COATED ORAL at 20:52

## 2018-09-09 RX ADMIN — FAMOTIDINE 20 MG: 20 TABLET ORAL at 20:52

## 2018-09-09 RX ADMIN — NEPHROCAP 1 MG: 1 CAP ORAL at 12:39

## 2018-09-09 RX ADMIN — SODIUM CHLORIDE, PRESERVATIVE FREE 10 ML: 5 INJECTION INTRAVENOUS at 12:39

## 2018-09-09 ASSESSMENT — PAIN DESCRIPTION - PAIN TYPE
TYPE: ACUTE PAIN
TYPE: ACUTE PAIN;SURGICAL PAIN

## 2018-09-09 ASSESSMENT — PAIN SCALES - GENERAL
PAINLEVEL_OUTOF10: 0
PAINLEVEL_OUTOF10: 4
PAINLEVEL_OUTOF10: 0
PAINLEVEL_OUTOF10: 6
PAINLEVEL_OUTOF10: 9

## 2018-09-09 ASSESSMENT — PAIN DESCRIPTION - FREQUENCY: FREQUENCY: CONTINUOUS

## 2018-09-09 ASSESSMENT — PAIN DESCRIPTION - LOCATION
LOCATION: NECK
LOCATION: NECK

## 2018-09-09 ASSESSMENT — PAIN DESCRIPTION - ONSET: ONSET: ON-GOING

## 2018-09-09 ASSESSMENT — PAIN DESCRIPTION - DESCRIPTORS: DESCRIPTORS: PATIENT UNABLE TO DESCRIBE

## 2018-09-09 ASSESSMENT — PAIN DESCRIPTION - ORIENTATION: ORIENTATION: LEFT

## 2018-09-09 NOTE — PROGRESS NOTES
Hospitalist Progress Note      PCP: Tc Silveramn MD    Date of Admission: 9/2/2018    Chief Complaint: confusion    Hospital Course: \"68 y.o. female who presented to [facility] with rest medical history of: COPD severe, CAD involving native coronary artery of native heart without angina pectoris, hyperlipidemia, hypertension, malignant neoplasm of bronchus of left upper lobe, status post thoracotomy,  Adjustment reaction with anxiety and depression, alcoholism, anabolic encephalopathy. Patient was brought in by her significant other, who is not currently in room. Pt  is alternating with angery and crying. All that she said is that she is having a hard time thinking. Significant other stated that she was having right-sided weakness. She was weak and unable to walk, needing up in a w/c. She is obviously able to move herself all over the bed. HPI gotten from chart and nursing. States that she stopped taking her Plavix for a port placement on 9/6/18. 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 He had a wedge resection on 8/13/18 with Dr. Deysi Coffman. Site is completely healed. Patient was also here on 8/18 for confusion. \"      Subjective:  Remarkably better today. Wide awake, talkative, insightful. Still some subtle confusion and expressive aphasia - when she tries to ask me complex questions about her cancer prognosis she falters trying to find the words.         Medications:  Reviewed    Infusion Medications    lactated ringers 50 mL/hr at 09/08/18 1415    nitroGLYCERIN      phenylephrine (CINDY-SYNEPHRINE) 50mg/250mL infusion Stopped (09/06/18 1923)     Scheduled Medications    famotidine  20 mg Oral BID    losartan  25 mg Oral Daily    clopidogrel  75 mg Oral Daily    aspirin  81 mg Oral Daily    vitamin D  2,000 Units Oral Daily    citalopram  20 mg Oral Daily    gabapentin  300 mg Oral QPM    therapeutic multivitamin-minerals  1 tablet Oral Daily    vitamin the last 72 hours. No results for input(s): INR in the last 72 hours. No results for input(s): Blanding Chace in the last 72 hours. Urinalysis:      Lab Results   Component Value Date    NITRU Negative 09/02/2018    WBCUA 3-5 09/02/2018    BACTERIA Rare 09/02/2018    RBCUA >100 09/02/2018    BLOODU LARGE 09/02/2018    SPECGRAV >=1.030 09/02/2018    GLUCOSEU Negative 09/02/2018       Radiology:  CTA HEAD W CONTRAST   Final Result   Left ICA proximal stenosis of approximately 90% by NASCET criteria. Right ICA proximal stenosis of approximately 50% by NASCET criteria. Right vertebral artery origin severe focal stenosis, which remains patent   distally. Bilateral vertebral arteries demonstrate scattered atheromatous   plaques without additional significant stenosis. Intracranially, there are carotid siphon calcifications without significant   stenosis of the anterior or posterior circulations. Left upper lobe nodular airspace disease. Minimal right upper lobe   ground-glass opacities. Findings better characterized on PET-CT 07/05/2018. Differential diagnosis includes infection, posttreatment change and   malignancy. Partially calcified mediastinal lymphadenopathy. Right thyroid lobe exophytic 1.5 cm nodule. Nonemergent thyroid ultrasound   recommended. CTA NECK W CONTRAST   Final Result   Left ICA proximal stenosis of approximately 90% by NASCET criteria. Right ICA proximal stenosis of approximately 50% by NASCET criteria. Right vertebral artery origin severe focal stenosis, which remains patent   distally. Bilateral vertebral arteries demonstrate scattered atheromatous   plaques without additional significant stenosis. Intracranially, there are carotid siphon calcifications without significant   stenosis of the anterior or posterior circulations. Left upper lobe nodular airspace disease. Minimal right upper lobe   ground-glass opacities.

## 2018-09-10 ENCOUNTER — TELEPHONE (OUTPATIENT)
Dept: CARDIOTHORACIC SURGERY | Age: 73
End: 2018-09-10

## 2018-09-10 VITALS
WEIGHT: 133.2 LBS | RESPIRATION RATE: 16 BRPM | HEART RATE: 89 BPM | DIASTOLIC BLOOD PRESSURE: 63 MMHG | OXYGEN SATURATION: 94 % | BODY MASS INDEX: 22.19 KG/M2 | TEMPERATURE: 98.1 F | HEIGHT: 65 IN | SYSTOLIC BLOOD PRESSURE: 132 MMHG

## 2018-09-10 PROCEDURE — 97116 GAIT TRAINING THERAPY: CPT

## 2018-09-10 PROCEDURE — 2580000003 HC RX 258: Performed by: NURSE PRACTITIONER

## 2018-09-10 PROCEDURE — 97535 SELF CARE MNGMENT TRAINING: CPT

## 2018-09-10 PROCEDURE — 6370000000 HC RX 637 (ALT 250 FOR IP): Performed by: NURSE PRACTITIONER

## 2018-09-10 PROCEDURE — 97530 THERAPEUTIC ACTIVITIES: CPT

## 2018-09-10 PROCEDURE — 6370000000 HC RX 637 (ALT 250 FOR IP): Performed by: INTERNAL MEDICINE

## 2018-09-10 PROCEDURE — 99232 SBSQ HOSP IP/OBS MODERATE 35: CPT | Performed by: PSYCHIATRY & NEUROLOGY

## 2018-09-10 PROCEDURE — 6370000000 HC RX 637 (ALT 250 FOR IP): Performed by: SURGERY

## 2018-09-10 RX ORDER — LOSARTAN POTASSIUM 25 MG/1
25 TABLET ORAL DAILY
Qty: 30 TABLET | Refills: 3 | Status: SHIPPED | OUTPATIENT
Start: 2018-09-10 | End: 2018-09-20 | Stop reason: SDUPTHER

## 2018-09-10 RX ORDER — CHOLECALCIFEROL (VITAMIN D3) 10 MCG
1 TABLET ORAL DAILY
Qty: 30 CAPSULE | Refills: 3 | Status: SHIPPED | OUTPATIENT
Start: 2018-09-10 | End: 2018-09-20 | Stop reason: SDUPTHER

## 2018-09-10 RX ORDER — ATORVASTATIN CALCIUM 40 MG/1
40 TABLET, FILM COATED ORAL DAILY
Qty: 30 TABLET | Refills: 3 | Status: SHIPPED | OUTPATIENT
Start: 2018-09-10 | End: 2018-09-20 | Stop reason: SDUPTHER

## 2018-09-10 RX ORDER — CLOPIDOGREL BISULFATE 75 MG/1
75 TABLET ORAL DAILY
Qty: 30 TABLET | Refills: 3 | Status: SHIPPED | OUTPATIENT
Start: 2018-09-10 | End: 2018-09-20 | Stop reason: SDUPTHER

## 2018-09-10 RX ADMIN — NEPHROCAP 1 MG: 1 CAP ORAL at 09:39

## 2018-09-10 RX ADMIN — POLYETHYLENE GLYCOL 3350 17 G: 17 POWDER, FOR SOLUTION ORAL at 09:39

## 2018-09-10 RX ADMIN — DOCUSATE SODIUM 100 MG: 100 CAPSULE, LIQUID FILLED ORAL at 09:39

## 2018-09-10 RX ADMIN — CITALOPRAM HYDROBROMIDE 20 MG: 20 TABLET ORAL at 09:39

## 2018-09-10 RX ADMIN — LOSARTAN POTASSIUM 25 MG: 25 TABLET, FILM COATED ORAL at 09:38

## 2018-09-10 RX ADMIN — CLOPIDOGREL BISULFATE 75 MG: 75 TABLET ORAL at 09:39

## 2018-09-10 RX ADMIN — FAMOTIDINE 20 MG: 20 TABLET ORAL at 09:38

## 2018-09-10 RX ADMIN — OXYBUTYNIN CHLORIDE 5 MG: 5 TABLET ORAL at 09:39

## 2018-09-10 RX ADMIN — VITAMIN D, TAB 1000IU (100/BT) 2000 UNITS: 25 TAB at 09:38

## 2018-09-10 RX ADMIN — ASPIRIN 81 MG 81 MG: 81 TABLET ORAL at 09:38

## 2018-09-10 ASSESSMENT — PAIN SCALES - GENERAL
PAINLEVEL_OUTOF10: 0

## 2018-09-10 NOTE — PROGRESS NOTES
Vascular    Overall seems improved since CEA  Incision healing well. Ok to discharge  F/u with me in 2 weeks.   Ok to have port placed

## 2018-09-10 NOTE — FLOWSHEET NOTE
Active   Peripheral Vascular   Peripheral Vascular (WDL) WDL   Edema None   RUE Neurovascular Assessment   Capillary Refill Less than/equal to 3 seconds   Color Appropriate for ethnicity; Ecchymosis   Temperature Warm   R Radial Pulse +2   LUE Neurovascular Assessment   Capillary Refill Less than/equal to 3 seconds   Color Appropriate for ethnicity; Ecchymosis   Temperature Warm   L Radial Pulse +2   RLE Neurovascular Assessment   Capillary Refill Less than/equal to 3 seconds   Color Appropriate for ethnicity; Ecchymosis   Temperature Warm   R Post Tibial Pulse +1   R Pedal Pulse +1   R Calf Tenderness  Negative   LLE Neurovascular Assessment   Capillary Refill Less than/equal to 3 seconds   Color Appropriate for ethnicity; Ecchymosis   Temperature Warm   L Post Tibial Pulse +1   L Pedal Pulse +1   Skin Color/Condition   Skin Color Appropriate for ethnicity; Ecchymosis   Skin Condition/Temp Dry;Poor turgor; Warm   Skin Integrity   Skin Integrity Bruising   Location scattered   Preventative Dressing No   Dressing Site Sacrum  (Pink, intact)   Assessed this shift? Yes   Skin Fold Management No   Skin Integrity Site 2   Skin Integrity Location 2 Other (Comment)  (radiation markings)   Location 2 Abdomen, chest   Musculoskeletal   RUE Full movement   LUE Full movement   RL Extremity Full movement   LL Extremity Full movement   Genitourinary   Genitourinary (WDL) WDL   Flank Tenderness No   Suprapubic Tenderness No   Dysuria DANICA   Urethral Catheter Non-latex; Double-lumen;Temperature probe 16 fr   Placement Date/Time: 09/07/18 2145   Urethral Catheter Timeout: Procedure; Patient; Availability of Implant; Appropriate Equipment;Site/Side;Sterile technique; Correct Position  Inserted by: Rosio Bustillo RN  Catheter Type: Non-latex; Double-lumen;Temperatu. .. Catheter Indications Acute urinary retention/obstruction   Site Assessment Pink; No urethral drainage   Urine Color Yellow   Urine Appearance Hazy   Incision 09/06/18 Neck Left Date First Assessed/Time First Assessed: 09/06/18 0753   Location: Neck  Wound Location Orientation: Left   Wound Assessment Clean;Dry; Intact   Annia-wound Assessment Clean;Edema; Intact   Drainage Amount None   Dressing/Treatment Open to air;Surgical glue   Dressing Status Clean;Dry; Intact   Incision 08/13/18 Chest Left   Date First Assessed/Time First Assessed: 08/13/18 1112   Location: Chest  Wound Location Orientation: Left   Wound Assessment Clean;Dry; Intact   Closure Open to air   Psychosocial   Patient Behaviors Calm; Cooperative   Pt assisted back in bed, kept comfortable. Pt pleasant, no c/o pain/discomfort. Disoriented to time, situation, shift assessment done. Pt re oriented, calm and agreeable. No c/o voiced.  @ bedside, no concerns voiced. Safety/fall prevention maintained. Personal belongings and call light within reach. Will continue to monitor.  JEWELN

## 2018-09-10 NOTE — DISCHARGE SUMMARY
Was unable to make the appointment with Dr. Mindy Kunz office will have [facility] RN follow-up with them to make the appointment.  Julien Blake RN

## 2018-09-10 NOTE — DISCHARGE SUMMARY
Hospital Medicine Discharge Summary    Patient ID: Yosef Canada      Patient's PCP: Eloy Mejia MD    Admit Date: 9/2/2018     Discharge Date:   09/10/18     Admitting Physician: Madison Jorge MD     Discharge Physician: Ketty Long MD     Discharge Diagnoses: Active Hospital Problems    Diagnosis Date Noted    Stenosis of internal carotid artery with cerebral infarction, left (HCC) [I63.232]     Moderate malnutrition ([facility] [E44.0] 09/04/2018    Acute cerebrovascular accident (CVA) ([address] [I63.9] 09/03/2018    Acute right-sided weakness [M62.89]     HTN (hypertension), benign [I10]     S/P thoracotomy [Z98.890]     Malignant neoplasm of bronchus of left upper lobe ([facility] [C34.12] 08/13/2018    Coronary artery disease involving native coronary artery of native heart without angina pectoris [I25.10] 08/04/2017    COPD, severe ([address] [J44.9] 05/26/2015    Hyperlipidemia, familial, high LDL [E78.4]        The patient was seen and examined on day of discharge and this discharge summary is in conjunction with any daily progress note from day of discharge. Hospital Course:  \"68 y. o. female who presented to [facility] with rest medical history of: COPD severe, CAD involving native coronary artery of native heart without angina pectoris, hyperlipidemia, hypertension, malignant neoplasm of bronchus of left upper lobe, status post thoracotomy,  Adjustment reaction with anxiety and depression, alcoholism, anabolic encephalopathy.  Patient was brought in by her significant other, who is not currently in room. Pt  is alternating with angery and crying.  All that she said is that she is having a hard time thinking.  Significant other stated that she was having right-sided weakness. She was weak and unable to walk, needing up in a w/c. She is obviously able to move herself all over the bed.   HPI gotten from chart and nursing.  States that she stopped taking her Plavix for a port motion without deformity. Skin: Skin color, texture, turgor normal.  No rashes or lesions. Neurologic:  CN2-12 intact  Psychiatric: alert, oriented, insightful, but still with noticeable confusion and mild expressive aphasia. Capillary Refill: Brisk,< 3 seconds   Peripheral Pulses: +2 palpable, equal bilaterally       Labs: For convenience and continuity at follow-up the following most recent labs are provided:      CBC:    Lab Results   Component Value Date    WBC 10.0 09/09/2018    HGB 10.0 09/09/2018    HCT 29.5 09/09/2018     09/09/2018       Renal:    Lab Results   Component Value Date     09/09/2018    K 3.5 09/09/2018     09/09/2018    CO2 30 09/09/2018    BUN 9 09/09/2018    CREATININE 0.8 09/09/2018    CALCIUM 9.0 09/09/2018    PHOS 3.1 08/15/2018         Significant Diagnostic Studies    Radiology:   CTA HEAD W CONTRAST   Final Result   Left ICA proximal stenosis of approximately 90% by NASCET criteria. Right ICA proximal stenosis of approximately 50% by NASCET criteria. Right vertebral artery origin severe focal stenosis, which remains patent   distally. Bilateral vertebral arteries demonstrate scattered atheromatous   plaques without additional significant stenosis. Intracranially, there are carotid siphon calcifications without significant   stenosis of the anterior or posterior circulations. Left upper lobe nodular airspace disease. Minimal right upper lobe   ground-glass opacities. Findings better characterized on PET-CT 07/05/2018. Differential diagnosis includes infection, posttreatment change and   malignancy. Partially calcified mediastinal lymphadenopathy. Right thyroid lobe exophytic 1.5 cm nodule. Nonemergent thyroid ultrasound   recommended. CTA NECK W CONTRAST   Final Result   Left ICA proximal stenosis of approximately 90% by NASCET criteria. Right ICA proximal stenosis of approximately 50% by NASCET criteria.       Right within 1-2 weeks. Follow up with Dr. Huber Logan in two weeks. F/u with oncology for port, radiation, and chemotherapy planning. Code Status:  Full Code     Activity: activity as tolerated    Diet: regular diet      Discharge Medications:     Current Discharge Medication List           Details   clopidogrel (PLAVIX) 75 MG tablet Take 1 tablet by mouth daily  Qty: 30 tablet, Refills: 3      b complex-C-folic acid (NEPHROCAPS) 1 MG capsule Take 1 capsule by mouth daily  Qty: 30 capsule, Refills: 3              Details   atorvastatin (LIPITOR) 40 MG tablet Take 1 tablet by mouth daily  Qty: 30 tablet, Refills: 3      losartan (COZAAR) 25 MG tablet Take 1 tablet by mouth daily  Qty: 30 tablet, Refills: 3              Details   ranitidine (ZANTAC) 150 MG capsule Take 150 mg by mouth 2 times daily      docusate sodium (COLACE, DULCOLAX) 100 MG CAPS Take 100 mg by mouth 2 times daily      citalopram (CELEXA) 20 MG tablet Take 1 tablet by mouth daily  Qty: 90 tablet, Refills: 3    Associated Diagnoses: Adjustment reaction with anxiety and depression      oxybutynin (DITROPAN) 5 MG tablet Take 1 tablet by mouth 2 times daily  Qty: 180 tablet, Refills: 3    Associated Diagnoses: Mixed incontinence      aspirin 81 MG tablet Take 81 mg by mouth daily      gabapentin (NEURONTIN) 300 MG capsule Take 300 mg by mouth every evening. .      Cholecalciferol (VITAMIN D3) 2000 UNITS CAPS Take  by mouth. Associated Diagnoses: Annual physical exam      vitamin B-12 (CYANOCOBALAMIN) 1000 MCG tablet Take 1,000 mcg by mouth daily. Associated Diagnoses: Fatigue      albuterol sulfate HFA (VENTOLIN HFA) 108 (90 Base) MCG/ACT inhaler Inhale 2 puffs into the lungs every 6 hours as needed for Wheezing               Time Spent on discharge is more than 30 minutes in the examination, evaluation, counseling and review of medications and discharge plan.       Signed:    Elías Rider MD   9/10/2018      Thank you OSCAR OLIVIA MD for the opportunity to be involved in this patient's care. If you have any questions or concerns please feel free to contact me at [phone].

## 2018-09-10 NOTE — TELEPHONE ENCOUNTER
Patient will be discharged from the hospital once SNF is in place. She was supposed to have a port a cath placed by Dr. Saman Mckinley on 9/6/18 which was cancelled due to hospitalization. Received ok by Dr. Kisha Man today to place port for cancer treatment. Placed call to Dr. aGb Moulton office to determine that they still want port placed and when they plan to start cancer treatment so we know when to place. Waiting call back from Dr. Gab Moulton office.

## 2018-09-10 NOTE — PROGRESS NOTES
Denies       Orientation  Orientation  Overall Orientation Status: Within Functional Limits     Objective   Bed mobility  Comment: Unable to assess, pt sitting up on EOB with OT upon arrival.  Transfers  Sit to Stand: Contact guard assistance (From chair to standing with RW)  Stand to sit: Minimal Assistance (For eccentric from standing with RW to sitting in chair)  Pt demonstrates decreased safety awareness with transfers, stepping outside of walker and preparing to sit into chair while several feet away from the chair. Therapist demonstrates to patient safe techniques for transfers to decrease risk for falls. Ambulation  Surface: level tile  Device: Rolling Walker  Assistance: Contact guard assistance;Minimal assistance  Quality of Gait: slow alba, decreased foot clearance and stride length, narrow KRISTINE, frequent pauses during gait and easily distracted by open environment, frequently veering to left requiring occasional min assist in order to maintain a straight trajectory. Distance: [de-identified]'  Comments: Pt c/o shortness of breath with gait, SpO2 90% on RA when returned to room, increased to 95% within 15 seconds    Stairs: No (Pt declined)     Balance  Sitting - Static: Fair;+  Sitting - Dynamic: Fair  Standing - Static: Fair  Standing - Dynamic: Fair  Comments: Pt with two instances of LOB in static standing while scanning environment, requiring min assist to recover. Exercises  Hip Flexion: X15 BLE   Knee Long Arc Quad: X15 BLE cues to achieve full extension ROM  Ankle Pumps: X15 BLE   Sit-to-Stands: 10 from chair, min assist for stand>sit      Assessment   Body structures, Functions, Activity limitations: Decreased functional mobility ; Decreased balance;Decreased strength;Decreased endurance  Assessment: Pt remains confused with difficulty following commands and is still not very conversive.  She demonstrates significant improvement in strength and mobility this follow up session compared to her re-eval on

## 2018-09-10 NOTE — CARE COORDINATION
9/10/18 Received a call from [facility] and they can not accept this pt floor to SNF due to [employer] score being 20. Asked PT/OT to do re-evaluate today. Will follow for PT/OT scores to see if pt will need a full precert. Will follow.

## 2018-09-10 NOTE — DISCHARGE SUMMARY
Discharge instructions explained to Pt, Pt verbalized understanding and . Tele box returned to unit. Pt escorted out via stretcher in ambulance. Pt being d/c to SNF [facility]. Report called to [facility].  Jace Perez

## 2018-09-10 NOTE — PROGRESS NOTES
Occupational Therapy  Facility/Department: [facility]  Daily Treatment Note  NAME: Reji Grady  :   MRN: [id number]    Date of Service: 9/10/2018    Discharge Recommendations:  [address]       Patient Diagnosis(es): The encounter diagnosis was Acute right-sided weakness. has a past medical history of Acute cerebrovascular accident (CVA) ([address] Alcohol abuse; Asthma; COPD, severe ([address] Coronary artery disease involving native coronary artery of native heart without angina pectoris; Essential hypertension; GERD (gastroesophageal reflux disease); Hyperlipidemia; Hypertension; Injury of esophagus; Insomnia; Kidney stone; Malignant neoplasm of bronchus of left upper lobe ([address] Mass of upper lobe of left lung; Neuropathy; Osteoarthritis; Prolonged emergence from general anesthesia; Stenosis of internal carotid artery with cerebral infarction, left ([address] and Urinary incontinence. has a past surgical history that includes Lithotripsy; Colonoscopy (2013); Hysterectomy, total abdominal; bronchoscopy (2017); Colonoscopy (2010); Thoracoscopy (Left, 2018); pr office/outpt visit,procedure only (Left, 2018); and pr thromboendartectmy neck,neck incis (Left, 2018). Restrictions  Restrictions/Precautions  Restrictions/Precautions: Fall Risk, General Precautions  Position Activity Restriction  Other position/activity restrictions: up with assist, avasys, receptive and expressive aphasia unable to communicate basic needs  Subjective   General  Chart Reviewed: Yes  Patient assessed for rehabilitation services?: Yes  Family / Caregiver Present: No  Referring Practitioner: Mary Ly  Diagnosis: CVA  Subjective  Subjective: Pt. in bed upon entry, not excited about getting out of bed.   General Comment  Comments: RN approval prior to session  Pain Assessment  Patient Currently in Pain: No  Vital Signs  Patient Currently in Pain: No balance;Decreased endurance;Decreased coordination  Assessment: Patient required [facility] for brushing teeth, Luz to CGA standing at sink d/t posterior lean and LOB. Patient donned briefs with Luz  Prognosis: Good  Patient Education: Role of OT, safe t/fs, bed mobility, ADLs, DC recommendations, call light  REQUIRES OT FOLLOW UP: Yes  Safety Devices  Safety Devices in place: Yes  Type of devices: Gait belt (Left patient with Physical therapist [place])          Plan   Plan  Times per week: 3-5x/wk while in acute care                 AM-PAC Score        AM-[facility] Inpatient Daily Activity Raw Score: 15  AM-PAC Inpatient ADL T-Scale Score : 34.69  ADL Inpatient CMS 0-100% Score: 56.46  ADL Inpatient CMS G-Code Modifier : CK    Goals  Short term goals  Time Frame for Short term goals: 2 weeks while in acute care  Short term goal 1: Pt will complete functional t/fs with Max A x1. Short term goal 2: Pt will complete bed mobility with Min A. Short term goal 3: Pt will complete LB dressing with Min A. Short term goal 4: Pt will complete 1-2 grooming tasks with Min A.    Patient Goals   Patient goals : unable to verbalize       Therapy Time   Individual Concurrent Group Co-treatment   Time In 1026         Time Out 1050         Minutes 24         Timed Code Treatment Minutes: 24 Minutes       Soraida Median, OTR/L

## 2018-09-11 LAB
EKG ATRIAL RATE: 72 BPM
EKG DIAGNOSIS: NORMAL
EKG P AXIS: 100 DEGREES
EKG P-R INTERVAL: 168 MS
EKG Q-T INTERVAL: 418 MS
EKG QRS DURATION: 86 MS
EKG QTC CALCULATION (BAZETT): 457 MS
EKG R AXIS: 70 DEGREES
EKG T AXIS: 84 DEGREES
EKG VENTRICULAR RATE: 72 BPM

## 2018-09-17 ENCOUNTER — TELEPHONE (OUTPATIENT)
Dept: INTERNAL MEDICINE CLINIC | Age: 73
End: 2018-09-17

## 2018-09-17 NOTE — TELEPHONE ENCOUNTER
Baltazar Rosario with [facility] is starting home care with pt and requesting a verbal order for speech recognition. Can you please contact Baltazar Rosario with information?

## 2018-09-18 ENCOUNTER — TELEPHONE (OUTPATIENT)
Dept: SURGERY | Age: 73
End: 2018-09-18

## 2018-09-18 NOTE — TELEPHONE ENCOUNTER
Called patient to inform her of time of the port placement. Her son told me she is not going forward with the chemo-so they are not having the port placed.

## 2018-09-20 ENCOUNTER — OFFICE VISIT (OUTPATIENT)
Dept: INTERNAL MEDICINE CLINIC | Age: 73
End: 2018-09-20

## 2018-09-20 VITALS
OXYGEN SATURATION: 97 % | WEIGHT: 132 LBS | HEART RATE: 64 BPM | DIASTOLIC BLOOD PRESSURE: 60 MMHG | HEIGHT: 67 IN | BODY MASS INDEX: 20.72 KG/M2 | SYSTOLIC BLOOD PRESSURE: 114 MMHG

## 2018-09-20 DIAGNOSIS — Z91.14 CONTROLLED SUBSTANCE AGREEMENT BROKEN: ICD-10-CM

## 2018-09-20 DIAGNOSIS — F43.23 ADJUSTMENT REACTION WITH ANXIETY AND DEPRESSION: ICD-10-CM

## 2018-09-20 DIAGNOSIS — E78.49 HYPERLIPIDEMIA, FAMILIAL, HIGH LDL: ICD-10-CM

## 2018-09-20 DIAGNOSIS — Z86.73 RECENT CEREBROVASCULAR ACCIDENT (CVA): ICD-10-CM

## 2018-09-20 DIAGNOSIS — I25.10 CORONARY ARTERY DISEASE INVOLVING NATIVE CORONARY ARTERY OF NATIVE HEART WITHOUT ANGINA PECTORIS: ICD-10-CM

## 2018-09-20 DIAGNOSIS — N39.46 MIXED INCONTINENCE: ICD-10-CM

## 2018-09-20 DIAGNOSIS — K21.9 GASTROESOPHAGEAL REFLUX DISEASE WITHOUT ESOPHAGITIS: ICD-10-CM

## 2018-09-20 DIAGNOSIS — C34.12 MALIGNANT NEOPLASM OF BRONCHUS OF LEFT UPPER LOBE (HCC): Primary | ICD-10-CM

## 2018-09-20 DIAGNOSIS — F51.01 PRIMARY INSOMNIA: ICD-10-CM

## 2018-09-20 DIAGNOSIS — I63.232 STENOSIS OF INTERNAL CAROTID ARTERY WITH CEREBRAL INFARCTION, LEFT (HCC): ICD-10-CM

## 2018-09-20 DIAGNOSIS — Z23 NEED FOR INFLUENZA VACCINATION: ICD-10-CM

## 2018-09-20 DIAGNOSIS — M17.0 PRIMARY OSTEOARTHRITIS OF BOTH KNEES: ICD-10-CM

## 2018-09-20 PROBLEM — Z91.148 CONTROLLED SUBSTANCE AGREEMENT BROKEN: Status: ACTIVE | Noted: 2018-09-20

## 2018-09-20 PROCEDURE — G0008 ADMIN INFLUENZA VIRUS VAC: HCPCS | Performed by: INTERNAL MEDICINE

## 2018-09-20 PROCEDURE — 90662 IIV NO PRSV INCREASED AG IM: CPT | Performed by: INTERNAL MEDICINE

## 2018-09-20 PROCEDURE — 99214 OFFICE O/P EST MOD 30 MIN: CPT | Performed by: INTERNAL MEDICINE

## 2018-09-20 RX ORDER — ATORVASTATIN CALCIUM 40 MG/1
40 TABLET, FILM COATED ORAL DAILY
Qty: 90 TABLET | Refills: 3 | Status: SHIPPED | OUTPATIENT
Start: 2018-09-20 | End: 2019-01-01 | Stop reason: SDUPTHER

## 2018-09-20 RX ORDER — LOSARTAN POTASSIUM 25 MG/1
25 TABLET ORAL DAILY
Qty: 90 TABLET | Refills: 3 | Status: ON HOLD | OUTPATIENT
Start: 2018-09-20 | End: 2019-01-01 | Stop reason: HOSPADM

## 2018-09-20 RX ORDER — CHOLECALCIFEROL (VITAMIN D3) 10 MCG
1 TABLET ORAL DAILY
Qty: 90 CAPSULE | Refills: 3 | Status: SHIPPED | OUTPATIENT
Start: 2018-09-20 | End: 2019-01-01 | Stop reason: CLARIF

## 2018-09-20 RX ORDER — TRAZODONE HYDROCHLORIDE 50 MG/1
TABLET ORAL
Qty: 90 TABLET | Refills: 3 | Status: SHIPPED | OUTPATIENT
Start: 2018-09-20 | End: 2019-01-01 | Stop reason: SDUPTHER

## 2018-09-20 RX ORDER — CLOPIDOGREL BISULFATE 75 MG/1
75 TABLET ORAL DAILY
Qty: 90 TABLET | Refills: 3 | Status: ON HOLD | OUTPATIENT
Start: 2018-09-20 | End: 2019-01-01 | Stop reason: HOSPADM

## 2018-09-20 RX ORDER — GABAPENTIN 300 MG/1
300 CAPSULE ORAL EVERY EVENING
Qty: 90 CAPSULE | Refills: 1 | Status: SHIPPED | OUTPATIENT
Start: 2018-09-20 | End: 2019-01-16 | Stop reason: SDUPTHER

## 2018-09-20 NOTE — PROGRESS NOTES
Date     09/09/2018    K 3.5 09/09/2018     09/09/2018    CO2 30 09/09/2018    BUN 9 09/09/2018    CREATININE 0.8 09/09/2018    GLUCOSE 103 (H) 09/09/2018    CALCIUM 9.0 09/09/2018     Lab Results   Component Value Date    CHOL 147 09/04/2018    TRIG 91 09/04/2018    HDL 48 09/04/2018    HDL 52 05/03/2012    LDLCALC 81 09/04/2018     Lab Results   Component Value Date    ALT 7 (L) 09/02/2018    AST 14 (L) 09/02/2018     No results found for: TSH, T4FREE  Lab Results   Component Value Date    WBC 10.0 09/09/2018    HGB 10.0 (L) 09/09/2018    HCT 29.5 (L) 09/09/2018    MCV 87.6 09/09/2018     09/09/2018     Lab Results   Component Value Date    INR 1.07 09/02/2018    INR 0.96 06/11/2018      No results found for: PSA   No results found for: [facility]     Patient Active Problem List   Diagnosis    Hyperlipidemia, familial, high LDL    Primary osteoarthritis of both knees    Primary insomnia    Mixed incontinence    Gastroesophageal reflux disease without esophagitis    Left hip pain    Seasonal allergies    COPD, severe (HCC)    Adjustment reaction with anxiety and depression    Coronary artery disease involving native coronary artery of native heart without angina pectoris    Multiple pulmonary nodules    Mediastinal lymphadenopathy    Clavicle fracture    Alcohol withdrawal syndrome with complication (HCC)    Centrilobular emphysema (HCC)    Encephalopathy, metabolic    Moderate malnutrition (HCC)    Lung nodule    Malignant neoplasm of bronchus of left upper lobe (HCC)    Acute postoperative pain    S/P thoracotomy    [facility] ([facility]A) [facility]    Acute right-sided weakness    HTN (hypertension), benign    Stenosis of internal carotid artery with cerebral infarction, left (HCC)       Allergies   Allergen Reactions    Codeine Itching    Morphine Itching and Other (See Comments)     On file at [facility]     Outpatient Prescriptions Marked as Taking for the 9/20/18 encounter (Office Visit) with Phoebe Blanco MD   Medication Sig Dispense Refill    atorvastatin (LIPITOR) 40 MG tablet Take 1 tablet by mouth daily 30 tablet 3    losartan (COZAAR) 25 MG tablet Take 1 tablet by mouth daily 30 tablet 3    clopidogrel (PLAVIX) 75 MG tablet Take 1 tablet by mouth daily 30 tablet 3    b complex-C-folic acid (NEPHROCAPS) 1 MG capsule Take 1 capsule by mouth daily 30 capsule 3    docusate sodium (COLACE, DULCOLAX) 100 MG CAPS Take 100 mg by mouth 2 times daily      albuterol sulfate HFA (VENTOLIN HFA) 108 (90 Base) MCG/ACT inhaler Inhale 2 puffs into the lungs every 6 hours as needed for Wheezing      citalopram (CELEXA) 20 MG tablet Take 1 tablet by mouth daily 90 tablet 3    oxybutynin (DITROPAN) 5 MG tablet Take 1 tablet by mouth 2 times daily 180 tablet 3    aspirin 81 MG tablet Take 81 mg by mouth daily      gabapentin (NEURONTIN) 300 MG capsule Take 300 mg by mouth every evening. Ericka Madison Cholecalciferol (VITAMIN D3) 2000 UNITS CAPS Take  by mouth.  vitamin B-12 (CYANOCOBALAMIN) 1000 MCG tablet Take 1,000 mcg by mouth daily. Review of Systems: 14 systems were negative except of what was stated on HPI    Nursing note and vitals reviewed. Vitals:    09/20/18 1051   BP: 114/60   Pulse: 64   SpO2: 97%   Weight: 132 lb (59.9 kg)   Height: 5' 6.5\" (1.689 m)     Wt Readings from Last 3 Encounters:   09/20/18 132 lb (59.9 kg)   09/10/18 133 lb 3.2 oz (60.4 kg)   08/22/18 130 lb (59 kg)     BP Readings from Last 3 Encounters:   09/20/18 114/60   09/10/18 132/63   09/06/18 105/70     Body mass index is 20.99 kg/m². Constitutional: Patient appears well-developed and well-nourished. No distress. Head: Normocephalic and atraumatic. Neck: Normal range of motion. Neck supple. No thyroidmegaly. Cardiovascular: Normal rate, regular rhythm, normal heart sounds and intact distal pulses. Pulmonary/Chest: Effort normal and breath sounds normal. No stridor.  No

## 2018-09-20 NOTE — LETTER
MEDICATION AGREEMENT     Rodrigo Ma  [birth year]      For certain conditions, multiple classes of medications may be used to help better manage your symptoms, and to improve your ability to function at home, work and in social settings. However, these medications do have risks, which will be discussed with you, including addiction and dependency. The following prescribed medications need frequent monitoring and will require you to partner and assist in your healthcare. Medication  Dose, instructions and quantity as indicated on current prescription bottle Diagnosis/Reason(s) for Taking Category   Neurontin 300 mg qhs   Chronic Bilateral knee pain                            Benefits and goals of Controlled Substance Medications: There are two potential goals for your treatment: (1) decreased pain and suffering (2) improved daily life functions. There are many possible treatments for your chronic condition(s), and, in addition to controlled substance medications, we will try alternatives such as physical therapy, yoga, massage, home daily exercise, meditation, relaxation techniques, injections, chiropractic manipulations, surgery, cognitive therapy, hypnosis and many medications that are not habit-forming. Use of controlled substance medications may be helpful, but they are unlikely to resolve all of your symptoms or restore all function. Risks of Controlled Substance Medications:    Opioid pain medications: These medications can lead to problems such as addiction/dependence, sedation, lightheadedness/dizziness, memory issues, falls, constipation, nausea, or vomiting. They may also impair the ability to drive or operate machinery. Additionally, these medications may lower testosterone levels, leading to loss of bone strength, stamina and sex drive.   They may cause problems with breathing, sleep apnea and reduced coughing, which are especially dangerous for patients with lung supplements and oral decongestants. Dependence withdrawal symptoms may include depressed mood, loss of interest, suicidal thoughts, anxiety, fatigue, appetite changes and agitation. Testosterone replacement therapy:  Potential side effects include increased risk of stroke and heart attack, blood clots, increased blood pressure, increased cholesterol, enlarged prostate, sleep apnea, irritability/aggression and other mood disorders, and decreased fertility. Other:     1. I understand that I have the following responsibilities:  · I will take medications at the dose and frequency prescribed. · I will not increase or change how I take my medications without the approval of the health care provider who signs this Medication Agreement. · I will arrange for refills at the prescribed interval ONLY during regular office hours. I will not ask for refills earlier than agreed, after-hours, on holidays or on weekends. · I will obtain all refills for these medications at  ·  ____________________________________  pharmacy (phone number  ·  ________________________), with full consent for my provider and pharmacist to exchange information in writing or verbally. · I will not request any pain medications or controlled substances from other providers and will inform this provider of all other medications I am taking. · I will inform my other health care providers that I am taking these medications and of the existence of this [id number]. In the event of an emergency, I will provide the same information to the emergency department providers. · I will protect my prescriptions and medications. I understand that lost or misplaced prescriptions will not be replaced. · I will keep medications only for my own use and will not share them with others. I will keep all medications away from children. · I agree to participate in any medical, psychological or psychiatric assessments recommended by my provider. · I will actively participate in any program designed to improve function, including social, physical, psychological and daily or work activities. 2. I will not use illegal or street drugs or another person's prescription. If I have an addiction problem with drugs or alcohol and my provider asks me to enter a program to address this issue, I agree to follow through. Such programs may include:  · 12-Step program and securing a sponsor  · Individual counseling   · Inpatient or outpatient treatment  · Other:_____________________________________________________________________________________________________________________________________________    If in treatment, I will request that a copy of the programs initial evaluation and treatment recommendations be sent to this provider and will not expect refills until that is received. I will also request written monthly updates be sent to this provider to verify my continuing treatment. 3. I will consent to drug screening upon my providers request to assure I am only taking the prescribed drugs, described in this MEDICATION AGREEMENT. I understand that a drug screen is a laboratory test in which a sample of my urine, blood or saliva is checked to see what drugs I have been taking. 4. I agree that I will treat the providers and staff at this office with respect at all times. I will keep all of my scheduled appointments, but if I need to cancel my appointment, I will do so a minimum of 24 hours before it is scheduled. 5. I understand that this provider may stop prescribing the medications listed if:  · I do not show any improvement in pain, or my activity has not improved. · I develop rapid tolerance or loss of improvement, as described in my treatment plan. · I develop significant side effects from the medication.   · My behavior is inconsistent with the responsibilities outlined above,

## 2018-09-20 NOTE — PROGRESS NOTES
Vaccine Information Sheet, \"Influenza - Inactivated\"  given to Alli Form, or parent/legal guardian of  Alli Form and verbalized understanding. Patient responses:    Have you ever had a reaction to a flu vaccine? No  Are you able to eat eggs without adverse effects? Yes  Do you have any current illness? No  Have you ever had [place] Syndrome? No    Flu vaccine given per order. Please see immunization tab.

## 2018-09-21 ENCOUNTER — OFFICE VISIT (OUTPATIENT)
Dept: VASCULAR SURGERY | Age: 73
End: 2018-09-21

## 2018-09-21 VITALS
WEIGHT: 132 LBS | SYSTOLIC BLOOD PRESSURE: 110 MMHG | HEIGHT: 67 IN | BODY MASS INDEX: 20.72 KG/M2 | DIASTOLIC BLOOD PRESSURE: 70 MMHG

## 2018-09-21 DIAGNOSIS — Z09 POSTOP CHECK: Primary | ICD-10-CM

## 2018-09-21 PROCEDURE — 99024 POSTOP FOLLOW-UP VISIT: CPT | Performed by: SURGERY

## 2018-11-19 ENCOUNTER — TELEPHONE (OUTPATIENT)
Dept: VASCULAR SURGERY | Age: 73
End: 2018-11-19

## 2019-01-01 ENCOUNTER — TELEPHONE (OUTPATIENT)
Dept: INTERNAL MEDICINE CLINIC | Age: 74
End: 2019-01-01

## 2019-01-01 ENCOUNTER — TELEPHONE (OUTPATIENT)
Dept: OTHER | Facility: CLINIC | Age: 74
End: 2019-01-01

## 2019-01-01 ENCOUNTER — HOSPITAL ENCOUNTER (INPATIENT)
Age: 74
LOS: 3 days | Discharge: HOME | DRG: 064 | End: 2019-02-04
Attending: EMERGENCY MEDICINE | Admitting: INTERNAL MEDICINE
Payer: MEDICARE

## 2019-01-01 ENCOUNTER — APPOINTMENT (OUTPATIENT)
Dept: MRI IMAGING | Age: 74
DRG: 064 | End: 2019-01-01
Payer: MEDICARE

## 2019-01-01 ENCOUNTER — CARE COORDINATION (OUTPATIENT)
Dept: CARE COORDINATION | Age: 74
End: 2019-01-01

## 2019-01-01 ENCOUNTER — HOSPITAL ENCOUNTER (OUTPATIENT)
Dept: CT IMAGING | Age: 74
Discharge: HOME | End: 2019-07-15
Payer: MEDICARE

## 2019-01-01 ENCOUNTER — OFFICE VISIT (OUTPATIENT)
Dept: INTERNAL MEDICINE CLINIC | Age: 74
End: 2019-01-01
Payer: MEDICARE

## 2019-01-01 ENCOUNTER — TELEPHONE (OUTPATIENT)
Dept: CARDIOLOGY CLINIC | Age: 74
End: 2019-01-01

## 2019-01-01 ENCOUNTER — OFFICE VISIT (OUTPATIENT)
Dept: NEUROLOGY | Age: 74
End: 2019-01-01
Payer: MEDICARE

## 2019-01-01 ENCOUNTER — OFFICE VISIT (OUTPATIENT)
Dept: PULMONOLOGY | Age: 74
End: 2019-01-01
Payer: MEDICARE

## 2019-01-01 ENCOUNTER — CARE COORDINATION (OUTPATIENT)
Dept: CASE MANAGEMENT | Age: 74
End: 2019-01-01

## 2019-01-01 ENCOUNTER — APPOINTMENT (OUTPATIENT)
Dept: CT IMAGING | Age: 74
End: 2019-01-01
Payer: MEDICARE

## 2019-01-01 ENCOUNTER — OFFICE VISIT (OUTPATIENT)
Dept: CARDIOLOGY CLINIC | Age: 74
End: 2019-01-01
Payer: MEDICARE

## 2019-01-01 ENCOUNTER — APPOINTMENT (OUTPATIENT)
Dept: GENERAL RADIOLOGY | Age: 74
End: 2019-01-01
Payer: MEDICARE

## 2019-01-01 ENCOUNTER — NURSE ONLY (OUTPATIENT)
Dept: INTERNAL MEDICINE CLINIC | Age: 74
End: 2019-01-01
Payer: MEDICARE

## 2019-01-01 ENCOUNTER — HOSPITAL ENCOUNTER (OUTPATIENT)
Age: 74
Discharge: HOME | End: 2019-04-08
Payer: MEDICARE

## 2019-01-01 ENCOUNTER — HOSPITAL ENCOUNTER (EMERGENCY)
Age: 74
Discharge: HOME | End: 2019-02-18
Attending: EMERGENCY MEDICINE
Payer: MEDICARE

## 2019-01-01 ENCOUNTER — HOSPITAL ENCOUNTER (EMERGENCY)
Age: 74
Discharge: HOME | End: 2019-11-06
Attending: EMERGENCY MEDICINE
Payer: MEDICARE

## 2019-01-01 ENCOUNTER — APPOINTMENT (OUTPATIENT)
Dept: CT IMAGING | Age: 74
DRG: 064 | End: 2019-01-01
Payer: MEDICARE

## 2019-01-01 ENCOUNTER — APPOINTMENT (OUTPATIENT)
Dept: GENERAL RADIOLOGY | Age: 74
DRG: 064 | End: 2019-01-01
Payer: MEDICARE

## 2019-01-01 ENCOUNTER — TELEPHONE (OUTPATIENT)
Dept: NEUROLOGY | Age: 74
End: 2019-01-01

## 2019-01-01 VITALS
DIASTOLIC BLOOD PRESSURE: 66 MMHG | HEART RATE: 57 BPM | BODY MASS INDEX: 19.53 KG/M2 | HEIGHT: 66 IN | OXYGEN SATURATION: 94 % | SYSTOLIC BLOOD PRESSURE: 120 MMHG

## 2019-01-01 VITALS
HEIGHT: 66 IN | WEIGHT: 124 LBS | SYSTOLIC BLOOD PRESSURE: 94 MMHG | HEART RATE: 60 BPM | DIASTOLIC BLOOD PRESSURE: 54 MMHG | OXYGEN SATURATION: 87 % | BODY MASS INDEX: 19.93 KG/M2

## 2019-01-01 VITALS
HEART RATE: 60 BPM | SYSTOLIC BLOOD PRESSURE: 135 MMHG | WEIGHT: 121 LBS | RESPIRATION RATE: 20 BRPM | HEIGHT: 66 IN | BODY MASS INDEX: 19.44 KG/M2 | DIASTOLIC BLOOD PRESSURE: 56 MMHG | TEMPERATURE: 98 F | OXYGEN SATURATION: 97 %

## 2019-01-01 VITALS
RESPIRATION RATE: 16 BRPM | BODY MASS INDEX: 21.21 KG/M2 | DIASTOLIC BLOOD PRESSURE: 68 MMHG | HEIGHT: 66 IN | SYSTOLIC BLOOD PRESSURE: 185 MMHG | HEART RATE: 64 BPM | OXYGEN SATURATION: 94 % | WEIGHT: 132 LBS | TEMPERATURE: 99 F

## 2019-01-01 VITALS
BODY MASS INDEX: 20.09 KG/M2 | HEIGHT: 66 IN | SYSTOLIC BLOOD PRESSURE: 117 MMHG | DIASTOLIC BLOOD PRESSURE: 60 MMHG | WEIGHT: 125 LBS | HEART RATE: 80 BPM | RESPIRATION RATE: 22 BRPM

## 2019-01-01 VITALS
DIASTOLIC BLOOD PRESSURE: 52 MMHG | HEIGHT: 67 IN | OXYGEN SATURATION: 80 % | SYSTOLIC BLOOD PRESSURE: 82 MMHG | BODY MASS INDEX: 19.62 KG/M2 | HEART RATE: 101 BPM | WEIGHT: 125 LBS

## 2019-01-01 VITALS
DIASTOLIC BLOOD PRESSURE: 65 MMHG | WEIGHT: 137.5 LBS | RESPIRATION RATE: 17 BRPM | OXYGEN SATURATION: 91 % | BODY MASS INDEX: 21.58 KG/M2 | HEART RATE: 77 BPM | TEMPERATURE: 99 F | SYSTOLIC BLOOD PRESSURE: 165 MMHG | HEIGHT: 67 IN

## 2019-01-01 VITALS
WEIGHT: 126 LBS | HEIGHT: 66 IN | OXYGEN SATURATION: 94 % | SYSTOLIC BLOOD PRESSURE: 107 MMHG | HEART RATE: 62 BPM | DIASTOLIC BLOOD PRESSURE: 56 MMHG | BODY MASS INDEX: 20.25 KG/M2 | RESPIRATION RATE: 18 BRPM

## 2019-01-01 VITALS
DIASTOLIC BLOOD PRESSURE: 60 MMHG | OXYGEN SATURATION: 90 % | HEART RATE: 110 BPM | BODY MASS INDEX: 19.58 KG/M2 | WEIGHT: 121.8 LBS | SYSTOLIC BLOOD PRESSURE: 102 MMHG | HEIGHT: 66 IN

## 2019-01-01 VITALS
HEART RATE: 59 BPM | DIASTOLIC BLOOD PRESSURE: 60 MMHG | BODY MASS INDEX: 19.44 KG/M2 | OXYGEN SATURATION: 94 % | HEIGHT: 66 IN | SYSTOLIC BLOOD PRESSURE: 112 MMHG | WEIGHT: 121 LBS

## 2019-01-01 VITALS
SYSTOLIC BLOOD PRESSURE: 128 MMHG | HEART RATE: 55 BPM | HEIGHT: 67 IN | DIASTOLIC BLOOD PRESSURE: 70 MMHG | BODY MASS INDEX: 20.72 KG/M2 | WEIGHT: 132 LBS | OXYGEN SATURATION: 95 %

## 2019-01-01 VITALS
OXYGEN SATURATION: 92 % | HEART RATE: 63 BPM | WEIGHT: 126 LBS | DIASTOLIC BLOOD PRESSURE: 60 MMHG | BODY MASS INDEX: 20.25 KG/M2 | HEIGHT: 66 IN | SYSTOLIC BLOOD PRESSURE: 80 MMHG

## 2019-01-01 VITALS
WEIGHT: 132 LBS | HEIGHT: 67 IN | BODY MASS INDEX: 20.72 KG/M2 | SYSTOLIC BLOOD PRESSURE: 130 MMHG | OXYGEN SATURATION: 94 % | DIASTOLIC BLOOD PRESSURE: 62 MMHG | HEART RATE: 103 BPM

## 2019-01-01 DIAGNOSIS — J43.2 CENTRILOBULAR EMPHYSEMA (HCC): Primary | ICD-10-CM

## 2019-01-01 DIAGNOSIS — F51.01 PRIMARY INSOMNIA: ICD-10-CM

## 2019-01-01 DIAGNOSIS — I10 ESSENTIAL HYPERTENSION: ICD-10-CM

## 2019-01-01 DIAGNOSIS — I48.0 PAF (PAROXYSMAL ATRIAL FIBRILLATION) (HCC): ICD-10-CM

## 2019-01-01 DIAGNOSIS — E83.42 HYPOMAGNESEMIA: ICD-10-CM

## 2019-01-01 DIAGNOSIS — R31.9 HEMATURIA, UNSPECIFIED TYPE: ICD-10-CM

## 2019-01-01 DIAGNOSIS — R53.1 WEAKNESS: ICD-10-CM

## 2019-01-01 DIAGNOSIS — Z86.73 RECENT CEREBROVASCULAR ACCIDENT (CVA): ICD-10-CM

## 2019-01-01 DIAGNOSIS — E78.49 HYPERLIPIDEMIA, FAMILIAL, HIGH LDL: ICD-10-CM

## 2019-01-01 DIAGNOSIS — I25.10 CORONARY ARTERY DISEASE INVOLVING NATIVE CORONARY ARTERY OF NATIVE HEART WITHOUT ANGINA PECTORIS: ICD-10-CM

## 2019-01-01 DIAGNOSIS — M62.81 RIGHT-SIDED MUSCLE WEAKNESS: ICD-10-CM

## 2019-01-01 DIAGNOSIS — I63.443 CEREBROVASCULAR ACCIDENT (CVA) DUE TO BILATERAL EMBOLISM OF CEREBELLAR ARTERIES (HCC): ICD-10-CM

## 2019-01-01 DIAGNOSIS — R41.82 ALTERED MENTAL STATUS, UNSPECIFIED ALTERED MENTAL STATUS TYPE: Primary | ICD-10-CM

## 2019-01-01 DIAGNOSIS — Z00.00 ROUTINE GENERAL MEDICAL EXAMINATION AT A HEALTH CARE FACILITY: Primary | ICD-10-CM

## 2019-01-01 DIAGNOSIS — I63.9 ACUTE CVA (CEREBROVASCULAR ACCIDENT) (HCC): Primary | ICD-10-CM

## 2019-01-01 DIAGNOSIS — R53.1 GENERALIZED WEAKNESS: Primary | ICD-10-CM

## 2019-01-01 DIAGNOSIS — J44.9 COPD, SEVERE (HCC): ICD-10-CM

## 2019-01-01 DIAGNOSIS — C34.12 MALIGNANT NEOPLASM OF BRONCHUS OF LEFT UPPER LOBE (HCC): ICD-10-CM

## 2019-01-01 DIAGNOSIS — R91.8 PULMONARY NODULES: ICD-10-CM

## 2019-01-01 DIAGNOSIS — J96.11 CHRONIC RESPIRATORY FAILURE WITH HYPOXIA (HCC): Primary | ICD-10-CM

## 2019-01-01 DIAGNOSIS — F17.200 SMOKER: ICD-10-CM

## 2019-01-01 DIAGNOSIS — R56.9 NEW ONSET SEIZURE (HCC): ICD-10-CM

## 2019-01-01 DIAGNOSIS — J96.11 CHRONIC RESPIRATORY FAILURE WITH HYPOXIA (HCC): ICD-10-CM

## 2019-01-01 DIAGNOSIS — S62.609A CLOSED FRACTURE DISLOCATION OF FINGER, INITIAL ENCOUNTER: ICD-10-CM

## 2019-01-01 DIAGNOSIS — I63.232 STENOSIS OF INTERNAL CAROTID ARTERY WITH CEREBRAL INFARCTION, LEFT (HCC): ICD-10-CM

## 2019-01-01 DIAGNOSIS — E44.0 MODERATE MALNUTRITION (HCC): Chronic | ICD-10-CM

## 2019-01-01 DIAGNOSIS — I48.0 PAF (PAROXYSMAL ATRIAL FIBRILLATION) (HCC): Primary | ICD-10-CM

## 2019-01-01 DIAGNOSIS — R91.1 LUNG NODULE: ICD-10-CM

## 2019-01-01 DIAGNOSIS — J44.9 COPD, SEVERE (HCC): Primary | ICD-10-CM

## 2019-01-01 DIAGNOSIS — J43.2 CENTRILOBULAR EMPHYSEMA (HCC): ICD-10-CM

## 2019-01-01 DIAGNOSIS — F17.200 TOBACCO DEPENDENCE: ICD-10-CM

## 2019-01-01 DIAGNOSIS — M16.12 PRIMARY OSTEOARTHRITIS OF LEFT HIP: ICD-10-CM

## 2019-01-01 DIAGNOSIS — R77.8 ELEVATED TROPONIN: ICD-10-CM

## 2019-01-01 DIAGNOSIS — R41.0 CONFUSION: Primary | ICD-10-CM

## 2019-01-01 DIAGNOSIS — R41.0 CONFUSION: ICD-10-CM

## 2019-01-01 DIAGNOSIS — I63.443 CEREBROVASCULAR ACCIDENT (CVA) DUE TO BILATERAL EMBOLISM OF CEREBELLAR ARTERIES (HCC): Primary | ICD-10-CM

## 2019-01-01 DIAGNOSIS — E78.5 DYSLIPIDEMIA: ICD-10-CM

## 2019-01-01 DIAGNOSIS — F43.23 ADJUSTMENT REACTION WITH ANXIETY AND DEPRESSION: ICD-10-CM

## 2019-01-01 DIAGNOSIS — F01.50 VASCULAR DEMENTIA WITHOUT BEHAVIORAL DISTURBANCE (HCC): ICD-10-CM

## 2019-01-01 DIAGNOSIS — N39.46 MIXED INCONTINENCE: ICD-10-CM

## 2019-01-01 LAB
A/G RATIO: 1.2 (ref 1.1–2.2)
A/G RATIO: 1.3 (ref 1.1–2.2)
A/G RATIO: 1.4 (ref 1.1–2.2)
ADENOVIRUS, DFA: NEGATIVE
ALBUMIN SERPL-MCNC: 3.6 G/DL (ref 3.4–5)
ALBUMIN SERPL-MCNC: 3.7 G/DL (ref 3.4–5)
ALBUMIN SERPL-MCNC: 3.9 G/DL (ref 3.4–5)
ALP BLD-CCNC: 67 U/L (ref 40–129)
ALP BLD-CCNC: 70 U/L (ref 40–129)
ALP BLD-CCNC: 89 U/L (ref 40–129)
ALT SERPL-CCNC: 11 U/L (ref 10–40)
ALT SERPL-CCNC: 14 U/L (ref 10–40)
ALT SERPL-CCNC: 7 U/L (ref 10–40)
AMMONIA: 12 UMOL/L (ref 11–51)
AMPHETAMINE SCREEN, URINE: POSITIVE
ANION GAP SERPL CALCULATED.3IONS-SCNC: 11 MMOL/L (ref 3–16)
ANION GAP SERPL CALCULATED.3IONS-SCNC: 12 MMOL/L (ref 3–16)
ANION GAP SERPL CALCULATED.3IONS-SCNC: 13 MMOL/L (ref 3–16)
ANION GAP SERPL CALCULATED.3IONS-SCNC: 9 MMOL/L (ref 3–16)
AST SERPL-CCNC: 14 U/L (ref 15–37)
AST SERPL-CCNC: 20 U/L (ref 15–37)
AST SERPL-CCNC: 24 U/L (ref 15–37)
BACTERIA: ABNORMAL /HPF
BACTERIA: ABNORMAL /HPF
BARBITURATE SCREEN URINE: ABNORMAL
BASOPHILS ABSOLUTE: 0 K/UL (ref 0–0.2)
BASOPHILS ABSOLUTE: 0.1 K/UL (ref 0–0.2)
BASOPHILS RELATIVE PERCENT: 0.7 %
BASOPHILS RELATIVE PERCENT: 0.9 %
BASOPHILS RELATIVE PERCENT: 1 %
BASOPHILS RELATIVE PERCENT: 1 %
BENZODIAZEPINE SCREEN, URINE: ABNORMAL
BILIRUB SERPL-MCNC: 0.4 MG/DL (ref 0–1)
BILIRUB SERPL-MCNC: 0.4 MG/DL (ref 0–1)
BILIRUB SERPL-MCNC: 0.9 MG/DL (ref 0–1)
BILIRUBIN URINE: NEGATIVE
BILIRUBIN, POC: NEGATIVE
BLOOD URINE, POC: 10
BLOOD, URINE: ABNORMAL
BLOOD, URINE: NEGATIVE
BLOOD, URINE: NEGATIVE
BUN BLDV-MCNC: 15 MG/DL (ref 7–20)
BUN BLDV-MCNC: 15 MG/DL (ref 7–20)
BUN BLDV-MCNC: 17 MG/DL (ref 7–20)
BUN BLDV-MCNC: 21 MG/DL (ref 7–20)
BUN BLDV-MCNC: 21 MG/DL (ref 7–20)
BUN BLDV-MCNC: 8 MG/DL (ref 7–20)
CALCIUM SERPL-MCNC: 8.3 MG/DL (ref 8.3–10.6)
CALCIUM SERPL-MCNC: 8.4 MG/DL (ref 8.3–10.6)
CALCIUM SERPL-MCNC: 8.8 MG/DL (ref 8.3–10.6)
CALCIUM SERPL-MCNC: 9 MG/DL (ref 8.3–10.6)
CALCIUM SERPL-MCNC: 9.2 MG/DL (ref 8.3–10.6)
CALCIUM SERPL-MCNC: 9.3 MG/DL (ref 8.3–10.6)
CANNABINOID SCREEN URINE: ABNORMAL
CHLORIDE BLD-SCNC: 102 MMOL/L (ref 99–110)
CHLORIDE BLD-SCNC: 103 MMOL/L (ref 99–110)
CHLORIDE BLD-SCNC: 104 MMOL/L (ref 99–110)
CHLORIDE BLD-SCNC: 105 MMOL/L (ref 99–110)
CHLORIDE BLD-SCNC: 109 MMOL/L (ref 99–110)
CHLORIDE BLD-SCNC: 98 MMOL/L (ref 99–110)
CLARITY, POC: NORMAL
CLARITY: ABNORMAL
CLARITY: CLEAR
CLARITY: CLEAR
CO2: 24 MMOL/L (ref 21–32)
CO2: 26 MMOL/L (ref 21–32)
CO2: 26 MMOL/L (ref 21–32)
CO2: 27 MMOL/L (ref 21–32)
CO2: 27 MMOL/L (ref 21–32)
CO2: 28 MMOL/L (ref 21–32)
COCAINE METABOLITE SCREEN URINE: ABNORMAL
COLOR, POC: YELLOW
COLOR: YELLOW
CREAT SERPL-MCNC: 0.7 MG/DL (ref 0.6–1.2)
CREAT SERPL-MCNC: 0.8 MG/DL (ref 0.6–1.2)
CREAT SERPL-MCNC: 0.9 MG/DL (ref 0.6–1.2)
CREAT SERPL-MCNC: 0.9 MG/DL (ref 0.6–1.2)
CREAT SERPL-MCNC: 1 MG/DL (ref 0.6–1.2)
CREAT SERPL-MCNC: 1.1 MG/DL (ref 0.6–1.2)
EKG ATRIAL RATE: 101 BPM
EKG ATRIAL RATE: 51 BPM
EKG ATRIAL RATE: 57 BPM
EKG ATRIAL RATE: 63 BPM
EKG DIAGNOSIS: NORMAL
EKG P AXIS: 107 DEGREES
EKG P AXIS: 63 DEGREES
EKG P-R INTERVAL: 166 MS
EKG P-R INTERVAL: 200 MS
EKG Q-T INTERVAL: 372 MS
EKG Q-T INTERVAL: 432 MS
EKG Q-T INTERVAL: 502 MS
EKG Q-T INTERVAL: 536 MS
EKG QRS DURATION: 82 MS
EKG QRS DURATION: 84 MS
EKG QRS DURATION: 86 MS
EKG QRS DURATION: 88 MS
EKG QTC CALCULATION (BAZETT): 442 MS
EKG QTC CALCULATION (BAZETT): 462 MS
EKG QTC CALCULATION (BAZETT): 479 MS
EKG QTC CALCULATION (BAZETT): 521 MS
EKG R AXIS: 83 DEGREES
EKG R AXIS: 89 DEGREES
EKG R AXIS: 91 DEGREES
EKG R AXIS: 92 DEGREES
EKG T AXIS: 108 DEGREES
EKG T AXIS: 65 DEGREES
EKG T AXIS: 80 DEGREES
EKG T AXIS: 98 DEGREES
EKG VENTRICULAR RATE: 100 BPM
EKG VENTRICULAR RATE: 51 BPM
EKG VENTRICULAR RATE: 57 BPM
EKG VENTRICULAR RATE: 63 BPM
EOSINOPHILS ABSOLUTE: 0.2 K/UL (ref 0–0.6)
EOSINOPHILS ABSOLUTE: 0.2 K/UL (ref 0–0.6)
EOSINOPHILS ABSOLUTE: 0.3 K/UL (ref 0–0.6)
EOSINOPHILS ABSOLUTE: 0.3 K/UL (ref 0–0.6)
EOSINOPHILS RELATIVE PERCENT: 2.6 %
EOSINOPHILS RELATIVE PERCENT: 3 %
EOSINOPHILS RELATIVE PERCENT: 5.8 %
EOSINOPHILS RELATIVE PERCENT: 6.5 %
EPITHELIAL CELLS, UA: ABNORMAL /HPF
EPITHELIAL CELLS, UA: ABNORMAL /HPF
ETHANOL: NORMAL MG/DL (ref 0–0.08)
GFR AFRICAN AMERICAN: 59
GFR AFRICAN AMERICAN: >60
GFR NON-AFRICAN AMERICAN: 49
GFR NON-AFRICAN AMERICAN: 54
GFR NON-AFRICAN AMERICAN: >60
GLOBULIN: 2.5 G/DL
GLOBULIN: 2.9 G/DL
GLOBULIN: 3 G/DL
GLUCOSE BLD-MCNC: 107 MG/DL (ref 70–99)
GLUCOSE BLD-MCNC: 109 MG/DL (ref 70–99)
GLUCOSE BLD-MCNC: 109 MG/DL (ref 70–99)
GLUCOSE BLD-MCNC: 117 MG/DL (ref 70–99)
GLUCOSE BLD-MCNC: 123 MG/DL (ref 70–99)
GLUCOSE BLD-MCNC: 90 MG/DL (ref 70–99)
GLUCOSE BLD-MCNC: 92 MG/DL (ref 70–99)
GLUCOSE BLD-MCNC: 97 MG/DL (ref 70–99)
GLUCOSE URINE, POC: NEGATIVE
GLUCOSE URINE: NEGATIVE MG/DL
HCT VFR BLD CALC: 32.6 % (ref 36–48)
HCT VFR BLD CALC: 33.2 % (ref 36–48)
HCT VFR BLD CALC: 33.5 % (ref 36–48)
HCT VFR BLD CALC: 35.8 % (ref 36–48)
HCT VFR BLD CALC: 36.5 % (ref 36–48)
HCT VFR BLD CALC: 38.5 % (ref 36–48)
HEMOGLOBIN: 10.9 G/DL (ref 12–16)
HEMOGLOBIN: 10.9 G/DL (ref 12–16)
HEMOGLOBIN: 11.1 G/DL (ref 12–16)
HEMOGLOBIN: 11.9 G/DL (ref 12–16)
HEMOGLOBIN: 12.2 G/DL (ref 12–16)
HEMOGLOBIN: 13 G/DL (ref 12–16)
INFLUENZA A,DFA: NEGATIVE
INFLUENZA B,DFA: NEGATIVE
INR BLD: 1.32 (ref 0.86–1.14)
KETONES, POC: NEGATIVE
KETONES, URINE: 15 MG/DL
KETONES, URINE: 15 MG/DL
KETONES, URINE: NEGATIVE MG/DL
LACTIC ACID: 1 MMOL/L (ref 0.4–2)
LACTIC ACID: 1 MMOL/L (ref 0.4–2)
LEUKOCYTE EST, POC: 25
LEUKOCYTE ESTERASE, URINE: ABNORMAL
LEUKOCYTE ESTERASE, URINE: NEGATIVE
LEUKOCYTE ESTERASE, URINE: NEGATIVE
LV EF: 58 %
LVEF MODALITY: NORMAL
LYMPHOCYTES ABSOLUTE: 0.3 K/UL (ref 1–5.1)
LYMPHOCYTES ABSOLUTE: 0.6 K/UL (ref 1–5.1)
LYMPHOCYTES ABSOLUTE: 0.9 K/UL (ref 1–5.1)
LYMPHOCYTES ABSOLUTE: 1 K/UL (ref 1–5.1)
LYMPHOCYTES RELATIVE PERCENT: 11.7 %
LYMPHOCYTES RELATIVE PERCENT: 13.5 %
LYMPHOCYTES RELATIVE PERCENT: 13.7 %
LYMPHOCYTES RELATIVE PERCENT: 6.2 %
Lab: ABNORMAL
MAGNESIUM: 1.5 MG/DL (ref 1.8–2.4)
MAGNESIUM: 1.5 MG/DL (ref 1.8–2.4)
MAGNESIUM: 1.9 MG/DL (ref 1.8–2.4)
MAGNESIUM: 2 MG/DL (ref 1.8–2.4)
MAGNESIUM: 2 MG/DL (ref 1.8–2.4)
MAGNESIUM: 2.2 MG/DL (ref 1.8–2.4)
MCH RBC QN AUTO: 28.6 PG (ref 26–34)
MCH RBC QN AUTO: 28.7 PG (ref 26–34)
MCH RBC QN AUTO: 28.8 PG (ref 26–34)
MCH RBC QN AUTO: 28.9 PG (ref 26–34)
MCH RBC QN AUTO: 29.1 PG (ref 26–34)
MCH RBC QN AUTO: 29.3 PG (ref 26–34)
MCHC RBC AUTO-ENTMCNC: 32.7 G/DL (ref 31–36)
MCHC RBC AUTO-ENTMCNC: 33.2 G/DL (ref 31–36)
MCHC RBC AUTO-ENTMCNC: 33.4 G/DL (ref 31–36)
MCHC RBC AUTO-ENTMCNC: 33.4 G/DL (ref 31–36)
MCHC RBC AUTO-ENTMCNC: 33.5 G/DL (ref 31–36)
MCHC RBC AUTO-ENTMCNC: 33.7 G/DL (ref 31–36)
MCV RBC AUTO: 85.6 FL (ref 80–100)
MCV RBC AUTO: 86.2 FL (ref 80–100)
MCV RBC AUTO: 86.2 FL (ref 80–100)
MCV RBC AUTO: 86.5 FL (ref 80–100)
MCV RBC AUTO: 87.5 FL (ref 80–100)
MCV RBC AUTO: 88.2 FL (ref 80–100)
METAPNEUMOVIRUS, DFA: NEGATIVE
METHADONE SCREEN, URINE: ABNORMAL
MICROSCOPIC EXAMINATION: NORMAL
MICROSCOPIC EXAMINATION: YES
MICROSCOPIC EXAMINATION: YES
MONOCYTES ABSOLUTE: 0.4 K/UL (ref 0–1.3)
MONOCYTES ABSOLUTE: 0.5 K/UL (ref 0–1.3)
MONOCYTES ABSOLUTE: 0.6 K/UL (ref 0–1.3)
MONOCYTES ABSOLUTE: 0.7 K/UL (ref 0–1.3)
MONOCYTES RELATIVE PERCENT: 12.8 %
MONOCYTES RELATIVE PERCENT: 15.4 %
MONOCYTES RELATIVE PERCENT: 5.4 %
MONOCYTES RELATIVE PERCENT: 6.4 %
NEUTROPHILS ABSOLUTE: 2.9 K/UL (ref 1.7–7.7)
NEUTROPHILS ABSOLUTE: 3.1 K/UL (ref 1.7–7.7)
NEUTROPHILS ABSOLUTE: 5.8 K/UL (ref 1.7–7.7)
NEUTROPHILS ABSOLUTE: 6 K/UL (ref 1.7–7.7)
NEUTROPHILS RELATIVE PERCENT: 66.1 %
NEUTROPHILS RELATIVE PERCENT: 71.6 %
NEUTROPHILS RELATIVE PERCENT: 76.8 %
NEUTROPHILS RELATIVE PERCENT: 78.9 %
NITRITE, POC: NEGATIVE
NITRITE, URINE: NEGATIVE
NITRITE, URINE: NEGATIVE
NITRITE, URINE: POSITIVE
OPIATE SCREEN URINE: ABNORMAL
ORGANISM: ABNORMAL
OXYCODONE URINE: ABNORMAL
PARAINFLUENZA 1 DFA STAIN: NEGATIVE
PARAINFLUENZA 2 DFA STAIN: NEGATIVE
PARAINFLUENZA 3: NEGATIVE
PDW BLD-RTO: 14.2 % (ref 12.4–15.4)
PDW BLD-RTO: 14.2 % (ref 12.4–15.4)
PDW BLD-RTO: 14.8 % (ref 12.4–15.4)
PDW BLD-RTO: 14.9 % (ref 12.4–15.4)
PDW BLD-RTO: 15 % (ref 12.4–15.4)
PDW BLD-RTO: 15.1 % (ref 12.4–15.4)
PERFORMED ON: ABNORMAL
PERFORMED ON: NORMAL
PH UA: 5.5
PH UA: 5.5
PH UA: 6.5
PH UA: 6.5 (ref 5–8)
PH, POC: 5
PHENCYCLIDINE SCREEN URINE: ABNORMAL
PLATELET # BLD: 184 K/UL (ref 135–450)
PLATELET # BLD: 213 K/UL (ref 135–450)
PLATELET # BLD: 220 K/UL (ref 135–450)
PLATELET # BLD: 250 K/UL (ref 135–450)
PLATELET # BLD: 253 K/UL (ref 135–450)
PLATELET # BLD: 256 K/UL (ref 135–450)
PMV BLD AUTO: 7.4 FL (ref 5–10.5)
PMV BLD AUTO: 7.7 FL (ref 5–10.5)
PMV BLD AUTO: 8 FL (ref 5–10.5)
PMV BLD AUTO: 8 FL (ref 5–10.5)
PMV BLD AUTO: 8.3 FL (ref 5–10.5)
PMV BLD AUTO: 9.5 FL (ref 5–10.5)
POTASSIUM REFLEX MAGNESIUM: 3.4 MMOL/L (ref 3.5–5.1)
POTASSIUM REFLEX MAGNESIUM: 3.5 MMOL/L (ref 3.5–5.1)
POTASSIUM REFLEX MAGNESIUM: 3.5 MMOL/L (ref 3.5–5.1)
POTASSIUM REFLEX MAGNESIUM: 3.6 MMOL/L (ref 3.5–5.1)
POTASSIUM SERPL-SCNC: 3.5 MMOL/L (ref 3.5–5.1)
POTASSIUM SERPL-SCNC: 3.6 MMOL/L (ref 3.5–5.1)
PROPOXYPHENE SCREEN: ABNORMAL
PROTEIN UA: 30 MG/DL
PROTEIN UA: NEGATIVE MG/DL
PROTEIN UA: NEGATIVE MG/DL
PROTEIN, POC: 30
PROTHROMBIN TIME: 15.1 SEC (ref 9.8–13)
RAPID INFLUENZA  B AGN: NEGATIVE
RAPID INFLUENZA A AGN: NEGATIVE
RBC # BLD: 3.77 M/UL (ref 4–5.2)
RBC # BLD: 3.82 M/UL (ref 4–5.2)
RBC # BLD: 3.88 M/UL (ref 4–5.2)
RBC # BLD: 4.05 M/UL (ref 4–5.2)
RBC # BLD: 4.24 M/UL (ref 4–5.2)
RBC # BLD: 4.46 M/UL (ref 4–5.2)
RBC UA: ABNORMAL /HPF (ref 0–2)
RBC UA: ABNORMAL /HPF (ref 0–2)
RESPIRATORY SYNCYTIAL VIRUS  (RSV) DFA: NEGATIVE
RSPFA SOURCE: NORMAL
SODIUM BLD-SCNC: 138 MMOL/L (ref 136–145)
SODIUM BLD-SCNC: 140 MMOL/L (ref 136–145)
SODIUM BLD-SCNC: 141 MMOL/L (ref 136–145)
SODIUM BLD-SCNC: 142 MMOL/L (ref 136–145)
SODIUM BLD-SCNC: 143 MMOL/L (ref 136–145)
SODIUM BLD-SCNC: 144 MMOL/L (ref 136–145)
SPECIFIC GRAVITY UA: 1.02
SPECIFIC GRAVITY UA: 1.02 (ref 1–1.03)
SPECIFIC GRAVITY UA: >=1.03
SPECIFIC GRAVITY, POC: 1.02
SPECIMEN STATUS: NORMAL
TOTAL CK: 285 U/L (ref 26–192)
TOTAL PROTEIN: 6.1 G/DL (ref 6.4–8.2)
TOTAL PROTEIN: 6.7 G/DL (ref 6.4–8.2)
TOTAL PROTEIN: 6.8 G/DL (ref 6.4–8.2)
TROPONIN: 0.07 NG/ML
TROPONIN: 0.09 NG/ML
TROPONIN: 0.09 NG/ML
TROPONIN: 0.1 NG/ML
TROPONIN: 0.11 NG/ML
TSH REFLEX: 2.82 UIU/ML (ref 0.27–4.2)
URINE CULTURE, ROUTINE: ABNORMAL
URINE CULTURE, ROUTINE: ABNORMAL
URINE CULTURE, ROUTINE: NORMAL
URINE REFLEX TO CULTURE: NORMAL
URINE TYPE: ABNORMAL
URINE TYPE: ABNORMAL
URINE TYPE: NORMAL
UROBILINOGEN, POC: 2
UROBILINOGEN, URINE: 0.2 E.U./DL
UROBILINOGEN, URINE: 1 E.U./DL
UROBILINOGEN, URINE: 1 E.U./DL
WBC # BLD: 4.3 K/UL (ref 4–11)
WBC # BLD: 4.4 K/UL (ref 4–11)
WBC # BLD: 6.1 K/UL (ref 4–11)
WBC # BLD: 7.6 K/UL (ref 4–11)
WBC # BLD: 7.6 K/UL (ref 4–11)
WBC # BLD: 8.6 K/UL (ref 4–11)
WBC UA: ABNORMAL /HPF (ref 0–5)
WBC UA: ABNORMAL /HPF (ref 0–5)

## 2019-01-01 PROCEDURE — 2700000000 HC OXYGEN THERAPY PER DAY

## 2019-01-01 PROCEDURE — 1090F PRES/ABSN URINE INCON ASSESS: CPT | Performed by: INTERNAL MEDICINE

## 2019-01-01 PROCEDURE — 71045 X-RAY EXAM CHEST 1 VIEW: CPT

## 2019-01-01 PROCEDURE — 99285 EMERGENCY DEPT VISIT HI MDM: CPT

## 2019-01-01 PROCEDURE — 6360000002 HC RX W HCPCS: Performed by: INTERNAL MEDICINE

## 2019-01-01 PROCEDURE — 93010 ELECTROCARDIOGRAM REPORT: CPT | Performed by: INTERNAL MEDICINE

## 2019-01-01 PROCEDURE — 1111F DSCHRG MED/CURRENT MED MERGE: CPT | Performed by: INTERNAL MEDICINE

## 2019-01-01 PROCEDURE — 87186 SC STD MICRODIL/AGAR DIL: CPT

## 2019-01-01 PROCEDURE — 93005 ELECTROCARDIOGRAM TRACING: CPT | Performed by: EMERGENCY MEDICINE

## 2019-01-01 PROCEDURE — G8988 SELF CARE GOAL STATUS: HCPCS

## 2019-01-01 PROCEDURE — 83735 ASSAY OF MAGNESIUM: CPT

## 2019-01-01 PROCEDURE — G8926 SPIRO NO PERF OR DOC: HCPCS | Performed by: INTERNAL MEDICINE

## 2019-01-01 PROCEDURE — G8427 DOCREV CUR MEDS BY ELIG CLIN: HCPCS | Performed by: INTERNAL MEDICINE

## 2019-01-01 PROCEDURE — G8420 CALC BMI NORM PARAMETERS: HCPCS | Performed by: INTERNAL MEDICINE

## 2019-01-01 PROCEDURE — 71250 CT THORAX DX C-: CPT

## 2019-01-01 PROCEDURE — 97166 OT EVAL MOD COMPLEX 45 MIN: CPT

## 2019-01-01 PROCEDURE — G8987 SELF CARE CURRENT STATUS: HCPCS

## 2019-01-01 PROCEDURE — 72125 CT NECK SPINE W/O DYE: CPT

## 2019-01-01 PROCEDURE — 70553 MRI BRAIN STEM W/O & W/DYE: CPT

## 2019-01-01 PROCEDURE — 81003 URINALYSIS AUTO W/O SCOPE: CPT

## 2019-01-01 PROCEDURE — 4040F PNEUMOC VAC/ADMIN/RCVD: CPT | Performed by: INTERNAL MEDICINE

## 2019-01-01 PROCEDURE — 3023F SPIROM DOC REV: CPT | Performed by: INTERNAL MEDICINE

## 2019-01-01 PROCEDURE — 36415 COLL VENOUS BLD VENIPUNCTURE: CPT

## 2019-01-01 PROCEDURE — 87280 RESPIRATORY SYNCYTIAL AG IF: CPT

## 2019-01-01 PROCEDURE — G0480 DRUG TEST DEF 1-7 CLASSES: HCPCS

## 2019-01-01 PROCEDURE — 6370000000 HC RX 637 (ALT 250 FOR IP): Performed by: INTERNAL MEDICINE

## 2019-01-01 PROCEDURE — 87086 URINE CULTURE/COLONY COUNT: CPT

## 2019-01-01 PROCEDURE — G8482 FLU IMMUNIZE ORDER/ADMIN: HCPCS | Performed by: INTERNAL MEDICINE

## 2019-01-01 PROCEDURE — 2580000003 HC RX 258: Performed by: INTERNAL MEDICINE

## 2019-01-01 PROCEDURE — 82140 ASSAY OF AMMONIA: CPT

## 2019-01-01 PROCEDURE — 81001 URINALYSIS AUTO W/SCOPE: CPT

## 2019-01-01 PROCEDURE — 4004F PT TOBACCO SCREEN RCVD TLK: CPT | Performed by: INTERNAL MEDICINE

## 2019-01-01 PROCEDURE — 96360 HYDRATION IV INFUSION INIT: CPT

## 2019-01-01 PROCEDURE — G8598 ASA/ANTIPLAT THER USED: HCPCS | Performed by: INTERNAL MEDICINE

## 2019-01-01 PROCEDURE — G8989 SELF CARE D/C STATUS: HCPCS

## 2019-01-01 PROCEDURE — 99214 OFFICE O/P EST MOD 30 MIN: CPT | Performed by: INTERNAL MEDICINE

## 2019-01-01 PROCEDURE — G8399 PT W/DXA RESULTS DOCUMENT: HCPCS | Performed by: INTERNAL MEDICINE

## 2019-01-01 PROCEDURE — 1123F ACP DISCUSS/DSCN MKR DOCD: CPT | Performed by: INTERNAL MEDICINE

## 2019-01-01 PROCEDURE — 6360000004 HC RX CONTRAST MEDICATION: Performed by: EMERGENCY MEDICINE

## 2019-01-01 PROCEDURE — 80048 BASIC METABOLIC PNL TOTAL CA: CPT

## 2019-01-01 PROCEDURE — 97161 PT EVAL LOW COMPLEX 20 MIN: CPT

## 2019-01-01 PROCEDURE — 1200000000 HC SEMI PRIVATE

## 2019-01-01 PROCEDURE — 87077 CULTURE AEROBIC IDENTIFY: CPT

## 2019-01-01 PROCEDURE — 3017F COLORECTAL CA SCREEN DOC REV: CPT | Performed by: INTERNAL MEDICINE

## 2019-01-01 PROCEDURE — 85027 COMPLETE CBC AUTOMATED: CPT

## 2019-01-01 PROCEDURE — 80053 COMPREHEN METABOLIC PANEL: CPT

## 2019-01-01 PROCEDURE — 97116 GAIT TRAINING THERAPY: CPT

## 2019-01-01 PROCEDURE — 84484 ASSAY OF TROPONIN QUANT: CPT

## 2019-01-01 PROCEDURE — 82550 ASSAY OF CK (CPK): CPT

## 2019-01-01 PROCEDURE — 84443 ASSAY THYROID STIM HORMONE: CPT

## 2019-01-01 PROCEDURE — A9579 GAD-BASE MR CONTRAST NOS,1ML: HCPCS | Performed by: INTERNAL MEDICINE

## 2019-01-01 PROCEDURE — 99496 TRANSJ CARE MGMT HIGH F2F 7D: CPT | Performed by: INTERNAL MEDICINE

## 2019-01-01 PROCEDURE — 99232 SBSQ HOSP IP/OBS MODERATE 35: CPT | Performed by: NURSE PRACTITIONER

## 2019-01-01 PROCEDURE — 87279 PARAINFLUENZA AG IF: CPT

## 2019-01-01 PROCEDURE — 99214 OFFICE O/P EST MOD 30 MIN: CPT | Performed by: NURSE PRACTITIONER

## 2019-01-01 PROCEDURE — 51701 INSERT BLADDER CATHETER: CPT

## 2019-01-01 PROCEDURE — 80307 DRUG TEST PRSMV CHEM ANLYZR: CPT

## 2019-01-01 PROCEDURE — 83605 ASSAY OF LACTIC ACID: CPT

## 2019-01-01 PROCEDURE — 87260 ADENOVIRUS AG IF: CPT

## 2019-01-01 PROCEDURE — 2580000003 HC RX 258: Performed by: EMERGENCY MEDICINE

## 2019-01-01 PROCEDURE — 85025 COMPLETE CBC W/AUTO DIFF WBC: CPT

## 2019-01-01 PROCEDURE — 97530 THERAPEUTIC ACTIVITIES: CPT

## 2019-01-01 PROCEDURE — 85610 PROTHROMBIN TIME: CPT

## 2019-01-01 PROCEDURE — 94761 N-INVAS EAR/PLS OXIMETRY MLT: CPT

## 2019-01-01 PROCEDURE — 94664 DEMO&/EVAL PT USE INHALER: CPT

## 2019-01-01 PROCEDURE — 87275 INFLUENZA B AG IF: CPT

## 2019-01-01 PROCEDURE — 99222 1ST HOSP IP/OBS MODERATE 55: CPT | Performed by: INTERNAL MEDICINE

## 2019-01-01 PROCEDURE — 99233 SBSQ HOSP IP/OBS HIGH 50: CPT | Performed by: NURSE PRACTITIONER

## 2019-01-01 PROCEDURE — 70450 CT HEAD/BRAIN W/O DYE: CPT

## 2019-01-01 PROCEDURE — 6360000004 HC RX CONTRAST MEDICATION: Performed by: INTERNAL MEDICINE

## 2019-01-01 PROCEDURE — 93306 TTE W/DOPPLER COMPLETE: CPT

## 2019-01-01 PROCEDURE — G0439 PPPS, SUBSEQ VISIT: HCPCS | Performed by: INTERNAL MEDICINE

## 2019-01-01 PROCEDURE — 99232 SBSQ HOSP IP/OBS MODERATE 35: CPT | Performed by: INTERNAL MEDICINE

## 2019-01-01 PROCEDURE — 6370000000 HC RX 637 (ALT 250 FOR IP): Performed by: NURSE PRACTITIONER

## 2019-01-01 PROCEDURE — 99233 SBSQ HOSP IP/OBS HIGH 50: CPT | Performed by: PSYCHIATRY & NEUROLOGY

## 2019-01-01 PROCEDURE — 99214 OFFICE O/P EST MOD 30 MIN: CPT | Performed by: PSYCHIATRY & NEUROLOGY

## 2019-01-01 PROCEDURE — 87276 INFLUENZA A AG IF: CPT

## 2019-01-01 PROCEDURE — 81002 URINALYSIS NONAUTO W/O SCOPE: CPT | Performed by: INTERNAL MEDICINE

## 2019-01-01 PROCEDURE — 99213 OFFICE O/P EST LOW 20 MIN: CPT | Performed by: INTERNAL MEDICINE

## 2019-01-01 PROCEDURE — 99223 1ST HOSP IP/OBS HIGH 75: CPT | Performed by: PSYCHIATRY & NEUROLOGY

## 2019-01-01 PROCEDURE — 99221 1ST HOSP IP/OBS SF/LOW 40: CPT | Performed by: INTERNAL MEDICINE

## 2019-01-01 PROCEDURE — 71260 CT THORAX DX C+: CPT

## 2019-01-01 PROCEDURE — 99406 BEHAV CHNG SMOKING 3-10 MIN: CPT

## 2019-01-01 PROCEDURE — 87299 ANTIBODY DETECTION NOS IF: CPT

## 2019-01-01 RX ORDER — MAGNESIUM SULFATE IN WATER 40 MG/ML
2 INJECTION, SOLUTION INTRAVENOUS ONCE
Status: COMPLETED | OUTPATIENT
Start: 2019-01-01 | End: 2019-01-01

## 2019-01-01 RX ORDER — BUDESONIDE AND FORMOTEROL FUMARATE DIHYDRATE 160; 4.5 UG/1; UG/1
2 AEROSOL RESPIRATORY (INHALATION) 2 TIMES DAILY
Qty: 1 INHALER | Refills: 5
Start: 2019-01-01 | End: 2019-01-01 | Stop reason: SDUPTHER

## 2019-01-01 RX ORDER — SODIUM CHLORIDE 0.9 % (FLUSH) 0.9 %
10 SYRINGE (ML) INJECTION PRN
Status: DISCONTINUED | OUTPATIENT
Start: 2019-01-01 | End: 2019-01-01 | Stop reason: HOSPADM

## 2019-01-01 RX ORDER — SODIUM CHLORIDE 0.9 % (FLUSH) 0.9 %
10 SYRINGE (ML) INJECTION EVERY 12 HOURS SCHEDULED
Status: DISCONTINUED | OUTPATIENT
Start: 2019-01-01 | End: 2019-01-01 | Stop reason: HOSPADM

## 2019-01-01 RX ORDER — LEVETIRACETAM 500 MG/1
500 TABLET ORAL 2 TIMES DAILY
Status: DISCONTINUED | OUTPATIENT
Start: 2019-01-01 | End: 2019-01-01 | Stop reason: HOSPADM

## 2019-01-01 RX ORDER — ATORVASTATIN CALCIUM 40 MG/1
40 TABLET, FILM COATED ORAL DAILY
Qty: 90 TABLET | Refills: 3 | Status: SHIPPED | OUTPATIENT
Start: 2019-01-01 | End: 2019-01-01 | Stop reason: SDUPTHER

## 2019-01-01 RX ORDER — METOPROLOL SUCCINATE 25 MG/1
25 TABLET, EXTENDED RELEASE ORAL DAILY
Status: DISCONTINUED | OUTPATIENT
Start: 2019-01-01 | End: 2019-01-01 | Stop reason: HOSPADM

## 2019-01-01 RX ORDER — 0.9 % SODIUM CHLORIDE 0.9 %
500 INTRAVENOUS SOLUTION INTRAVENOUS ONCE
Status: COMPLETED | OUTPATIENT
Start: 2019-01-01 | End: 2019-01-01

## 2019-01-01 RX ORDER — ACETAMINOPHEN 500 MG
1000 TABLET ORAL NIGHTLY PRN
COMMUNITY

## 2019-01-01 RX ORDER — METOPROLOL SUCCINATE 25 MG/1
25 TABLET, EXTENDED RELEASE ORAL DAILY
Qty: 90 TABLET | Refills: 1 | Status: SHIPPED | OUTPATIENT
Start: 2019-01-01 | End: 2019-01-01 | Stop reason: SDUPTHER

## 2019-01-01 RX ORDER — LEVETIRACETAM 500 MG/1
500 TABLET ORAL 2 TIMES DAILY
Qty: 60 TABLET | Refills: 3 | Status: SHIPPED | OUTPATIENT
Start: 2019-01-01 | End: 2019-01-01 | Stop reason: SINTOL

## 2019-01-01 RX ORDER — BUDESONIDE AND FORMOTEROL FUMARATE DIHYDRATE 160; 4.5 UG/1; UG/1
2 AEROSOL RESPIRATORY (INHALATION) 2 TIMES DAILY
Qty: 4 INHALER | Refills: 0
Start: 2019-01-01 | End: 2019-01-01

## 2019-01-01 RX ORDER — CLOPIDOGREL BISULFATE 75 MG/1
75 TABLET ORAL DAILY
Status: DISCONTINUED | OUTPATIENT
Start: 2019-01-01 | End: 2019-01-01

## 2019-01-01 RX ORDER — METOPROLOL SUCCINATE 25 MG/1
25 TABLET, EXTENDED RELEASE ORAL DAILY
Qty: 90 TABLET | Refills: 1 | OUTPATIENT
Start: 2019-01-01

## 2019-01-01 RX ORDER — LOSARTAN POTASSIUM 50 MG/1
50 TABLET ORAL 2 TIMES DAILY
Qty: 60 TABLET | Refills: 1 | Status: SHIPPED | OUTPATIENT
Start: 2019-01-01 | End: 2019-01-01 | Stop reason: ALTCHOICE

## 2019-01-01 RX ORDER — OXYBUTYNIN CHLORIDE 5 MG/1
5 TABLET ORAL 2 TIMES DAILY
Qty: 180 TABLET | Refills: 3 | Status: SHIPPED | OUTPATIENT
Start: 2019-01-01

## 2019-01-01 RX ORDER — OXYBUTYNIN CHLORIDE 5 MG/1
5 TABLET ORAL 2 TIMES DAILY
Status: DISCONTINUED | OUTPATIENT
Start: 2019-01-01 | End: 2019-01-01 | Stop reason: HOSPADM

## 2019-01-01 RX ORDER — METOPROLOL SUCCINATE 25 MG/1
25 TABLET, EXTENDED RELEASE ORAL DAILY
Qty: 90 TABLET | Refills: 3 | Status: SHIPPED | OUTPATIENT
Start: 2019-01-01

## 2019-01-01 RX ORDER — ASPIRIN 81 MG/1
81 TABLET ORAL DAILY
Status: DISCONTINUED | OUTPATIENT
Start: 2019-01-01 | End: 2019-01-01 | Stop reason: HOSPADM

## 2019-01-01 RX ORDER — DOBUTAMINE HYDROCHLORIDE 200 MG/100ML
10 INJECTION INTRAVENOUS CONTINUOUS
Status: DISCONTINUED | OUTPATIENT
Start: 2019-01-01 | End: 2019-01-01

## 2019-01-01 RX ORDER — PANTOPRAZOLE SODIUM 40 MG/1
40 TABLET, DELAYED RELEASE ORAL
Qty: 90 TABLET | Refills: 3 | Status: SHIPPED | OUTPATIENT
Start: 2019-01-01

## 2019-01-01 RX ORDER — GABAPENTIN 300 MG/1
1 CAPSULE ORAL PRN
COMMUNITY
End: 2019-01-01 | Stop reason: ALTCHOICE

## 2019-01-01 RX ORDER — TRAZODONE HYDROCHLORIDE 50 MG/1
50 TABLET ORAL NIGHTLY
Status: DISCONTINUED | OUTPATIENT
Start: 2019-01-01 | End: 2019-01-01 | Stop reason: HOSPADM

## 2019-01-01 RX ORDER — BENZONATATE 100 MG/1
100 CAPSULE ORAL 3 TIMES DAILY PRN
Status: DISCONTINUED | OUTPATIENT
Start: 2019-01-01 | End: 2019-01-01 | Stop reason: HOSPADM

## 2019-01-01 RX ORDER — LOSARTAN POTASSIUM 25 MG/1
25 TABLET ORAL DAILY
Status: DISCONTINUED | OUTPATIENT
Start: 2019-01-01 | End: 2019-01-01

## 2019-01-01 RX ORDER — PANTOPRAZOLE SODIUM 40 MG/1
40 TABLET, DELAYED RELEASE ORAL
Qty: 30 TABLET | Refills: 3 | Status: SHIPPED | OUTPATIENT
Start: 2019-01-01 | End: 2019-01-01 | Stop reason: SDUPTHER

## 2019-01-01 RX ORDER — PANTOPRAZOLE SODIUM 40 MG/1
40 TABLET, DELAYED RELEASE ORAL
Status: DISCONTINUED | OUTPATIENT
Start: 2019-01-01 | End: 2019-01-01 | Stop reason: HOSPADM

## 2019-01-01 RX ORDER — METOPROLOL SUCCINATE 25 MG/1
25 TABLET, EXTENDED RELEASE ORAL DAILY
Qty: 30 TABLET | Refills: 1 | Status: SHIPPED | OUTPATIENT
Start: 2019-01-01 | End: 2019-01-01 | Stop reason: SDUPTHER

## 2019-01-01 RX ORDER — LOSARTAN POTASSIUM 25 MG/1
50 TABLET ORAL 2 TIMES DAILY
Status: DISCONTINUED | OUTPATIENT
Start: 2019-01-01 | End: 2019-01-01 | Stop reason: HOSPADM

## 2019-01-01 RX ORDER — ATORVASTATIN CALCIUM 40 MG/1
40 TABLET, FILM COATED ORAL DAILY
Qty: 90 TABLET | Refills: 3 | OUTPATIENT
Start: 2019-01-01

## 2019-01-01 RX ORDER — ONDANSETRON 2 MG/ML
4 INJECTION INTRAMUSCULAR; INTRAVENOUS EVERY 6 HOURS PRN
Status: DISCONTINUED | OUTPATIENT
Start: 2019-01-01 | End: 2019-01-01 | Stop reason: HOSPADM

## 2019-01-01 RX ORDER — ATORVASTATIN CALCIUM 40 MG/1
40 TABLET, FILM COATED ORAL DAILY
Qty: 90 TABLET | Refills: 3 | Status: SHIPPED | OUTPATIENT
Start: 2019-01-01

## 2019-01-01 RX ORDER — ALBUTEROL SULFATE 90 UG/1
2 AEROSOL, METERED RESPIRATORY (INHALATION) EVERY 6 HOURS PRN
Status: DISCONTINUED | OUTPATIENT
Start: 2019-01-01 | End: 2019-01-01 | Stop reason: HOSPADM

## 2019-01-01 RX ORDER — CITALOPRAM 20 MG/1
20 TABLET ORAL DAILY
Qty: 90 TABLET | Refills: 3 | Status: SHIPPED | OUTPATIENT
Start: 2019-01-01

## 2019-01-01 RX ORDER — CITALOPRAM 20 MG/1
20 TABLET ORAL DAILY
Status: DISCONTINUED | OUTPATIENT
Start: 2019-01-01 | End: 2019-01-01 | Stop reason: HOSPADM

## 2019-01-01 RX ORDER — SODIUM CHLORIDE 9 MG/ML
1000 INJECTION, SOLUTION INTRAVENOUS CONTINUOUS
Status: DISCONTINUED | OUTPATIENT
Start: 2019-01-01 | End: 2019-01-01

## 2019-01-01 RX ORDER — TRAZODONE HYDROCHLORIDE 50 MG/1
TABLET ORAL
Qty: 90 TABLET | Refills: 3 | OUTPATIENT
Start: 2019-01-01

## 2019-01-01 RX ORDER — SODIUM CHLORIDE 9 MG/ML
INJECTION, SOLUTION INTRAVENOUS CONTINUOUS
Status: DISCONTINUED | OUTPATIENT
Start: 2019-01-01 | End: 2019-01-01 | Stop reason: HOSPADM

## 2019-01-01 RX ORDER — BUDESONIDE AND FORMOTEROL FUMARATE DIHYDRATE 160; 4.5 UG/1; UG/1
2 AEROSOL RESPIRATORY (INHALATION) 2 TIMES DAILY
Qty: 3 INHALER | Refills: 4 | Status: SHIPPED | OUTPATIENT
Start: 2019-01-01

## 2019-01-01 RX ORDER — BENZONATATE 100 MG/1
100 CAPSULE ORAL 3 TIMES DAILY PRN
Qty: 30 CAPSULE | Refills: 0 | Status: SHIPPED | OUTPATIENT
Start: 2019-01-01 | End: 2019-01-01

## 2019-01-01 RX ORDER — CHOLECALCIFEROL (VITAMIN D3) 125 MCG
1000 CAPSULE ORAL DAILY
Status: DISCONTINUED | OUTPATIENT
Start: 2019-01-01 | End: 2019-01-01 | Stop reason: HOSPADM

## 2019-01-01 RX ORDER — PREDNISONE 10 MG/1
30 TABLET ORAL DAILY
Qty: 12 TABLET | Refills: 0 | Status: SHIPPED | OUTPATIENT
Start: 2019-01-01 | End: 2019-01-01

## 2019-01-01 RX ORDER — MAGNESIUM SULFATE 1 G/100ML
1 INJECTION INTRAVENOUS ONCE
Status: DISCONTINUED | OUTPATIENT
Start: 2019-01-01 | End: 2019-01-01

## 2019-01-01 RX ORDER — LOSARTAN POTASSIUM 25 MG/1
25 TABLET ORAL 2 TIMES DAILY
Status: DISCONTINUED | OUTPATIENT
Start: 2019-01-01 | End: 2019-01-01

## 2019-01-01 RX ORDER — ATORVASTATIN CALCIUM 40 MG/1
40 TABLET, FILM COATED ORAL DAILY
Status: DISCONTINUED | OUTPATIENT
Start: 2019-01-01 | End: 2019-01-01 | Stop reason: HOSPADM

## 2019-01-01 RX ORDER — TRAZODONE HYDROCHLORIDE 50 MG/1
TABLET ORAL
Qty: 90 TABLET | Refills: 3 | Status: SHIPPED | OUTPATIENT
Start: 2019-01-01

## 2019-01-01 RX ADMIN — SODIUM CHLORIDE: 9 INJECTION, SOLUTION INTRAVENOUS at 15:14

## 2019-01-01 RX ADMIN — ENOXAPARIN SODIUM 60 MG: 60 INJECTION SUBCUTANEOUS at 10:33

## 2019-01-01 RX ADMIN — ENOXAPARIN SODIUM 60 MG: 60 INJECTION SUBCUTANEOUS at 08:33

## 2019-01-01 RX ADMIN — ENOXAPARIN SODIUM 60 MG: 60 INJECTION SUBCUTANEOUS at 05:06

## 2019-01-01 RX ADMIN — LEVETIRACETAM 500 MG: 500 TABLET, FILM COATED ORAL at 20:09

## 2019-01-01 RX ADMIN — SODIUM CHLORIDE: 9 INJECTION, SOLUTION INTRAVENOUS at 05:27

## 2019-01-01 RX ADMIN — ATORVASTATIN CALCIUM 40 MG: 40 TABLET, FILM COATED ORAL at 10:34

## 2019-01-01 RX ADMIN — BENZONATATE 100 MG: 100 CAPSULE ORAL at 00:48

## 2019-01-01 RX ADMIN — CEFTRIAXONE SODIUM 1 G: 1 INJECTION, POWDER, FOR SOLUTION INTRAMUSCULAR; INTRAVENOUS at 20:39

## 2019-01-01 RX ADMIN — OXYBUTYNIN CHLORIDE 5 MG: 5 TABLET ORAL at 20:59

## 2019-01-01 RX ADMIN — LOSARTAN POTASSIUM 25 MG: 25 TABLET, FILM COATED ORAL at 08:34

## 2019-01-01 RX ADMIN — LOSARTAN POTASSIUM 25 MG: 25 TABLET, FILM COATED ORAL at 08:17

## 2019-01-01 RX ADMIN — CEFTRIAXONE SODIUM 1 G: 1 INJECTION, POWDER, FOR SOLUTION INTRAMUSCULAR; INTRAVENOUS at 20:09

## 2019-01-01 RX ADMIN — CLOPIDOGREL BISULFATE 75 MG: 75 TABLET ORAL at 08:17

## 2019-01-01 RX ADMIN — CEFTRIAXONE SODIUM 1 G: 1 INJECTION, POWDER, FOR SOLUTION INTRAMUSCULAR; INTRAVENOUS at 20:45

## 2019-01-01 RX ADMIN — ENOXAPARIN SODIUM 60 MG: 60 INJECTION SUBCUTANEOUS at 20:09

## 2019-01-01 RX ADMIN — BENZONATATE 100 MG: 100 CAPSULE ORAL at 00:01

## 2019-01-01 RX ADMIN — CYANOCOBALAMIN TAB 500 MCG 1000 MCG: 500 TAB at 08:31

## 2019-01-01 RX ADMIN — CYANOCOBALAMIN TAB 500 MCG 1000 MCG: 500 TAB at 10:33

## 2019-01-01 RX ADMIN — SODIUM CHLORIDE 1000 ML: 9 INJECTION, SOLUTION INTRAVENOUS at 13:14

## 2019-01-01 RX ADMIN — BENZONATATE 100 MG: 100 CAPSULE ORAL at 12:46

## 2019-01-01 RX ADMIN — Medication 10 ML: at 10:34

## 2019-01-01 RX ADMIN — Medication 10 ML: at 20:59

## 2019-01-01 RX ADMIN — IOPAMIDOL 75 ML: 755 INJECTION, SOLUTION INTRAVENOUS at 16:23

## 2019-01-01 RX ADMIN — TRAZODONE HYDROCHLORIDE 50 MG: 50 TABLET ORAL at 20:09

## 2019-01-01 RX ADMIN — PREDNISONE 30 MG: 10 TABLET ORAL at 10:33

## 2019-01-01 RX ADMIN — PANTOPRAZOLE SODIUM 40 MG: 40 TABLET, DELAYED RELEASE ORAL at 06:01

## 2019-01-01 RX ADMIN — Medication 10 ML: at 22:45

## 2019-01-01 RX ADMIN — MAGNESIUM SULFATE HEPTAHYDRATE 2 G: 40 INJECTION, SOLUTION INTRAVENOUS at 23:46

## 2019-01-01 RX ADMIN — LOSARTAN POTASSIUM 50 MG: 25 TABLET, FILM COATED ORAL at 20:09

## 2019-01-01 RX ADMIN — ASPIRIN 81 MG: 81 TABLET, COATED ORAL at 08:18

## 2019-01-01 RX ADMIN — BENZONATATE 100 MG: 100 CAPSULE ORAL at 14:38

## 2019-01-01 RX ADMIN — OXYBUTYNIN CHLORIDE 5 MG: 5 TABLET ORAL at 20:09

## 2019-01-01 RX ADMIN — SODIUM CHLORIDE: 9 INJECTION, SOLUTION INTRAVENOUS at 20:38

## 2019-01-01 RX ADMIN — CITALOPRAM HYDROBROMIDE 20 MG: 20 TABLET ORAL at 08:18

## 2019-01-01 RX ADMIN — TRAZODONE HYDROCHLORIDE 50 MG: 50 TABLET ORAL at 22:45

## 2019-01-01 RX ADMIN — TRAZODONE HYDROCHLORIDE 50 MG: 50 TABLET ORAL at 20:59

## 2019-01-01 RX ADMIN — LEVETIRACETAM 500 MG: 500 TABLET, FILM COATED ORAL at 10:34

## 2019-01-01 RX ADMIN — OXYBUTYNIN CHLORIDE 5 MG: 5 TABLET ORAL at 08:32

## 2019-01-01 RX ADMIN — BENZONATATE 100 MG: 100 CAPSULE ORAL at 17:10

## 2019-01-01 RX ADMIN — LEVETIRACETAM 500 MG: 500 TABLET, FILM COATED ORAL at 20:59

## 2019-01-01 RX ADMIN — ASPIRIN 81 MG: 81 TABLET, COATED ORAL at 10:34

## 2019-01-01 RX ADMIN — METOPROLOL SUCCINATE 25 MG: 25 TABLET, EXTENDED RELEASE ORAL at 12:34

## 2019-01-01 RX ADMIN — OXYBUTYNIN CHLORIDE 5 MG: 5 TABLET ORAL at 10:34

## 2019-01-01 RX ADMIN — LEVETIRACETAM 500 MG: 500 TABLET, FILM COATED ORAL at 08:18

## 2019-01-01 RX ADMIN — ASPIRIN 81 MG: 81 TABLET, COATED ORAL at 08:32

## 2019-01-01 RX ADMIN — ATORVASTATIN CALCIUM 40 MG: 40 TABLET, FILM COATED ORAL at 08:17

## 2019-01-01 RX ADMIN — LOSARTAN POTASSIUM 50 MG: 25 TABLET, FILM COATED ORAL at 10:33

## 2019-01-01 RX ADMIN — ENOXAPARIN SODIUM 60 MG: 60 INJECTION SUBCUTANEOUS at 20:59

## 2019-01-01 RX ADMIN — BENZONATATE 100 MG: 100 CAPSULE ORAL at 01:42

## 2019-01-01 RX ADMIN — PANTOPRAZOLE SODIUM 40 MG: 40 TABLET, DELAYED RELEASE ORAL at 06:20

## 2019-01-01 RX ADMIN — CITALOPRAM HYDROBROMIDE 20 MG: 20 TABLET ORAL at 10:33

## 2019-01-01 RX ADMIN — LEVETIRACETAM 500 MG: 500 TABLET, FILM COATED ORAL at 14:44

## 2019-01-01 RX ADMIN — SODIUM CHLORIDE: 9 INJECTION, SOLUTION INTRAVENOUS at 11:19

## 2019-01-01 RX ADMIN — Medication 10 ML: at 08:31

## 2019-01-01 RX ADMIN — OXYBUTYNIN CHLORIDE 5 MG: 5 TABLET ORAL at 08:17

## 2019-01-01 RX ADMIN — SODIUM CHLORIDE 500 ML: 9 INJECTION, SOLUTION INTRAVENOUS at 14:27

## 2019-01-01 RX ADMIN — SODIUM CHLORIDE: 9 INJECTION, SOLUTION INTRAVENOUS at 00:50

## 2019-01-01 RX ADMIN — GADOTERIDOL 12 ML: 279.3 INJECTION, SOLUTION INTRAVENOUS at 10:42

## 2019-01-01 RX ADMIN — OXYBUTYNIN CHLORIDE 5 MG: 5 TABLET ORAL at 22:45

## 2019-01-01 RX ADMIN — MAGNESIUM SULFATE HEPTAHYDRATE 2 G: 40 INJECTION, SOLUTION INTRAVENOUS at 10:32

## 2019-01-01 RX ADMIN — CITALOPRAM HYDROBROMIDE 20 MG: 20 TABLET ORAL at 08:31

## 2019-01-01 RX ADMIN — CLOPIDOGREL BISULFATE 75 MG: 75 TABLET ORAL at 10:33

## 2019-01-01 RX ADMIN — ATORVASTATIN CALCIUM 40 MG: 40 TABLET, FILM COATED ORAL at 08:32

## 2019-01-01 RX ADMIN — MAGNESIUM SULFATE HEPTAHYDRATE 1 G: 1 INJECTION, SOLUTION INTRAVENOUS at 22:45

## 2019-01-01 RX ADMIN — CYANOCOBALAMIN TAB 500 MCG 1000 MCG: 500 TAB at 08:17

## 2019-01-01 ASSESSMENT — ENCOUNTER SYMPTOMS
EYE PAIN: 0
SHORTNESS OF BREATH: 0
VOMITING: 0
DIARRHEA: 0
NAUSEA: 0
COUGH: 0
COUGH: 0
ABDOMINAL PAIN: 0
DIARRHEA: 0
DIARRHEA: 0
TROUBLE SWALLOWING: 0
RHINORRHEA: 0
CHEST TIGHTNESS: 0
VOMITING: 0
COUGH: 0
EYE ITCHING: 0
ABDOMINAL PAIN: 0
EYE DISCHARGE: 0
STRIDOR: 0
STRIDOR: 0
CHOKING: 0
VOICE CHANGE: 0
SORE THROAT: 0
WHEEZING: 0
SHORTNESS OF BREATH: 0
SORE THROAT: 0
ABDOMINAL PAIN: 0
CONSTIPATION: 0
CONSTIPATION: 0
SHORTNESS OF BREATH: 1
CHEST TIGHTNESS: 0
SORE THROAT: 0
BACK PAIN: 0

## 2019-01-01 ASSESSMENT — PAIN SCALES - GENERAL
PAINLEVEL_OUTOF10: 0
PAINLEVEL_OUTOF10: 9

## 2019-01-01 ASSESSMENT — SLEEP AND FATIGUE QUESTIONNAIRES
HOW LIKELY ARE YOU TO NOD OFF OR FALL ASLEEP WHILE SITTING QUIETLY AFTER LUNCH WITHOUT ALCOHOL: 1
HOW LIKELY ARE YOU TO NOD OFF OR FALL ASLEEP WHILE LYING DOWN TO REST IN THE AFTERNOON WHEN CIRCUMSTANCES PERMIT: 3
ESS TOTAL SCORE: 7
HOW LIKELY ARE YOU TO NOD OFF OR FALL ASLEEP WHEN YOU ARE A PASSENGER IN A CAR FOR AN HOUR WITHOUT A BREAK: 0
HOW LIKELY ARE YOU TO NOD OFF OR FALL ASLEEP WHILE SITTING INACTIVE IN A PUBLIC PLACE: 0
NECK CIRCUMFERENCE (INCHES): 14.5
HOW LIKELY ARE YOU TO NOD OFF OR FALL ASLEEP WHILE WATCHING TV: 2
HOW LIKELY ARE YOU TO NOD OFF OR FALL ASLEEP WHILE SITTING AND TALKING TO SOMEONE: 0
HOW LIKELY ARE YOU TO NOD OFF OR FALL ASLEEP IN A CAR, WHILE STOPPED FOR A FEW MINUTES IN TRAFFIC: 0
HOW LIKELY ARE YOU TO NOD OFF OR FALL ASLEEP WHILE SITTING AND READING: 1

## 2019-01-01 ASSESSMENT — PATIENT HEALTH QUESTIONNAIRE - PHQ9
1. LITTLE INTEREST OR PLEASURE IN DOING THINGS: 1
2. FEELING DOWN, DEPRESSED OR HOPELESS: 1
SUM OF ALL RESPONSES TO PHQ QUESTIONS 1-9: 2
SUM OF ALL RESPONSES TO PHQ QUESTIONS 1-9: 2
SUM OF ALL RESPONSES TO PHQ9 QUESTIONS 1 & 2: 2
SUM OF ALL RESPONSES TO PHQ QUESTIONS 1-9: 2
SUM OF ALL RESPONSES TO PHQ QUESTIONS 1-9: 2

## 2019-01-01 ASSESSMENT — PAIN DESCRIPTION - PAIN TYPE: TYPE: ACUTE PAIN

## 2019-01-01 ASSESSMENT — PAIN DESCRIPTION - LOCATION: LOCATION: HEAD

## 2019-01-01 ASSESSMENT — LIFESTYLE VARIABLES: HOW OFTEN DO YOU HAVE A DRINK CONTAINING ALCOHOL: 0

## 2019-01-16 ENCOUNTER — OFFICE VISIT (OUTPATIENT)
Dept: INTERNAL MEDICINE CLINIC | Age: 74
End: 2019-01-16
Payer: MEDICARE

## 2019-01-16 VITALS
DIASTOLIC BLOOD PRESSURE: 62 MMHG | OXYGEN SATURATION: 96 % | HEIGHT: 67 IN | BODY MASS INDEX: 21.5 KG/M2 | HEART RATE: 55 BPM | WEIGHT: 137 LBS | SYSTOLIC BLOOD PRESSURE: 116 MMHG

## 2019-01-16 DIAGNOSIS — F43.23 ADJUSTMENT REACTION WITH ANXIETY AND DEPRESSION: ICD-10-CM

## 2019-01-16 DIAGNOSIS — F51.01 PRIMARY INSOMNIA: ICD-10-CM

## 2019-01-16 DIAGNOSIS — Z91.14 CONTROLLED SUBSTANCE AGREEMENT BROKEN: ICD-10-CM

## 2019-01-16 DIAGNOSIS — E78.49 HYPERLIPIDEMIA, FAMILIAL, HIGH LDL: ICD-10-CM

## 2019-01-16 DIAGNOSIS — I10 ESSENTIAL HYPERTENSION: ICD-10-CM

## 2019-01-16 DIAGNOSIS — J44.9 COPD, SEVERE (HCC): ICD-10-CM

## 2019-01-16 DIAGNOSIS — C34.12 MALIGNANT NEOPLASM OF BRONCHUS OF LEFT UPPER LOBE (HCC): ICD-10-CM

## 2019-01-16 DIAGNOSIS — K21.9 GASTROESOPHAGEAL REFLUX DISEASE WITHOUT ESOPHAGITIS: ICD-10-CM

## 2019-01-16 DIAGNOSIS — E44.0 MODERATE MALNUTRITION (HCC): Chronic | ICD-10-CM

## 2019-01-16 DIAGNOSIS — N39.46 MIXED INCONTINENCE: ICD-10-CM

## 2019-01-16 DIAGNOSIS — M17.0 PRIMARY OSTEOARTHRITIS OF BOTH KNEES: Primary | ICD-10-CM

## 2019-01-16 PROCEDURE — 99214 OFFICE O/P EST MOD 30 MIN: CPT | Performed by: INTERNAL MEDICINE

## 2019-01-16 RX ORDER — GABAPENTIN 300 MG/1
300 CAPSULE ORAL EVERY EVENING
Qty: 90 CAPSULE | Refills: 1 | Status: SHIPPED | OUTPATIENT
Start: 2019-01-01 | End: 2019-01-01 | Stop reason: ALTCHOICE

## 2019-02-01 PROBLEM — E83.42 HYPOMAGNESEMIA: Status: ACTIVE | Noted: 2019-01-01

## 2019-02-01 PROBLEM — G93.40 ENCEPHALOPATHY ACUTE: Status: ACTIVE | Noted: 2019-01-01

## 2019-02-01 PROBLEM — I50.9 CHF (CONGESTIVE HEART FAILURE) (HCC): Status: ACTIVE | Noted: 2019-01-01

## 2019-02-02 PROBLEM — I63.133 CEREBROVASCULAR ACCIDENT (CVA) DUE TO BILATERAL EMBOLISM OF CAROTID ARTERIES (HCC): Status: ACTIVE | Noted: 2018-09-03

## 2019-02-03 PROBLEM — I63.443 CEREBROVASCULAR ACCIDENT (CVA) DUE TO BILATERAL EMBOLISM OF CEREBELLAR ARTERIES (HCC): Status: ACTIVE | Noted: 2018-09-03

## 2019-02-04 PROBLEM — N39.0 E. COLI UTI: Status: ACTIVE | Noted: 2019-01-01

## 2019-02-04 PROBLEM — B96.20 E. COLI UTI: Status: ACTIVE | Noted: 2019-01-01

## 2019-02-04 PROBLEM — I63.9 ACUTE CVA (CEREBROVASCULAR ACCIDENT) (HCC): Status: ACTIVE | Noted: 2019-01-01

## 2019-03-04 PROBLEM — F01.50 VASCULAR DEMENTIA WITHOUT BEHAVIORAL DISTURBANCE (HCC): Status: ACTIVE | Noted: 2019-01-01

## 2019-04-08 NOTE — PATIENT INSTRUCTIONS
1. Continue current heart medicines  2. Have blood work at your earliest convenience - does not need to be fasting  3. Follow up with me in 3 months    Your provider has ordered lab work for further evaluation. The order/prescription is included in your paper work. You may have your labs completed at any of the [facility] locations includin [facility] associated with the [employer] located on the [employer]. The address is [place] [address] on the first floor. It is the building directly across from the Emergency Room. The main entrance to this building faces [employer]. You will have to drive around the building to locate the main entrance.  You may also use [facility] lab located inside the hospital, [facility] lab located inside the hospital, [place] [address] ER located off [facility].  You may use any other [facility] facilities or your [facility] office.  Labs may also be completed at any other facility of your choice, however, please allow 72 hours to receive your labs for review. If you do not receive your lab results 72-96 hours after completing, please call the office.

## 2019-04-08 NOTE — PROGRESS NOTES
[address]   Cardiac Evaluation    Primary Care Doctor:  Prince Venegas MD    Chief Complaint   Patient presents with    1 Month Follow-Up     Recovering from UTI.  Hypertension        History of Present Illness:   I had the pleasure of seeing Leo Mo in follow up for PAF, hx HTN recently hypotensive, HLD, recent CVA, hx CVD s/p L CEA, lung cancer s/p lobectomy, smoker. In February we stopped the losartan due to hypotension and lethargy. She was continued on low dose metoprolol as well as ASA and Eliquis. She has seen neurology in follow up with decrease then discontinuation of the Keppra for lethargy. She is doing a bit better. She is recovering from UTI about 1.5 weeks ago. Her BP at home runs 100-120/ 70-80's though low here. Her HR at home stays 50-60's, She complains of being tired. They are struggling getting Eliquis covered due to cost, looking in to changing coverage. Her  states she will have blood on her lips and mouth in the AM but denies any hemoptysis. No other bleeding complications. She denies cough. + dyspnea on exertion. Her sleep is variable, worse recently with UTI and restlessness. Her appetite is improving, likes her donuts. She continues to smoke, not interested in quitting. Leo Mo describes symptoms including fatigue, dyspnea but denies chest pain, palpitations, orthopnea, PND, early saiety, edema, syncope.      NYHA:   II  ACC/ AHA Stage:    C    Past Medical History:   has a past medical history of Acute cerebrovascular accident (CVA) ([address] Alcohol abuse, Asthma, CHF (congestive heart failure) ([address] COPD, severe ([address] Coronary artery disease involving native coronary artery of native heart without angina pectoris, Essential hypertension, GERD (gastroesophageal reflux disease), Hyperlipidemia, Hypertension, Injury of esophagus, Insomnia, Kidney stone, Left hip pain, Malignant neoplasm of bronchus of left upper lobe ([address] Mass Take 1 tablet by mouth every morning (before breakfast) 2/5/19  Yes Marivel Garcia MD   atorvastatin (LIPITOR) 40 MG tablet Take 1 tablet by mouth daily 9/20/18  Yes Rosy Vallejo MD   b complex-C-folic acid (NEPHROCAPS) 1 MG capsule Take 1 capsule by mouth daily 9/20/18  Yes Rosy Vallejo MD   traZODone (DESYREL) 50 MG tablet TAKE ONE TABLET BY MOUTH EVERY NIGHT AS NEEDED 9/20/18  Yes Vaishnavi Vallejo MD   albuterol sulfate HFA (VENTOLIN HFA) 108 (90 Base) MCG/ACT inhaler Inhale 2 puffs into the lungs every 6 hours as needed for Wheezing   Yes Historical Provider, MD   citalopram (CELEXA) 20 MG tablet Take 1 tablet by mouth daily 7/12/18  Yes Vaishnavi Vallejo MD   oxybutynin (DITROPAN) 5 MG tablet Take 1 tablet by mouth 2 times daily 7/12/18  Yes Vaishnavi Vallejo MD   aspirin 81 MG tablet Take 81 mg by mouth daily   Yes Historical Provider, MD   Cholecalciferol (VITAMIN D3) 2000 UNITS CAPS Take  by mouth. Yes Historical Provider, MD   vitamin B-12 (CYANOCOBALAMIN) 1000 MCG tablet Take 1,000 mcg by mouth daily. Yes Historical Provider, MD   oxybutynin (DITROPAN XL) 15 MG CR tablet TAKE ONE TABLET BY MOUTH EVERY NIGHT AT BEDTIME 3/5/13 8/22/18  Vaishnavi Vallejo MD        Allergies:  Codeine and Morphine     Review of Systems:   Review of Systems   Constitutional: Positive for activity change, appetite change and fatigue. Respiratory: Positive for shortness of breath. Negative for cough. Cardiovascular: Negative. Neurological: Positive for dizziness and weakness. Negative for syncope. Psychiatric/Behavioral: Positive for sleep disturbance.         Physical Examination:    Vitals:    04/08/19 1023 04/08/19 1034   BP: 84/62 80/60   Pulse: 52    SpO2: 92%    Weight: 126 lb (57.2 kg)    Height: 5' 6\" (1.676 m)         Constitutional and General Appearance: Warm and dry, no apparent distress, normal coloration  HEENT:  Normocephalic, atraumatic  Respiratory:  · Normal excursion and expansion without use of accessory muscles  · Resp Auscultation: Clear to auscultation   Cardiovascular:  · The apical impulses not displaced  · Heart tones are crisp and normal  · JVP <8 cm H2O  · Irregular rate and rhythm, normal O8J4, + systolic murmur, no g/r  · Peripheral pulses are symmetrical and full  · There is no clubbing, cyanosis of the extremities.   · No BLE edema  · Pedal Pulses: 2+ and equal   Abdomen:  · No masses or tenderness  · Liver/Spleen: No Abnormalities Noted  Neurological/Psychiatric:  · Alert and oriented in all spheres  · Moves all extremities well  · Exhibits normal gait balance and coordination  · No abnormalities of mood, affect, memory, mentation, or behavior are noted    Lab Data:  Most recent lab results below reviewed in office    CBC:   Lab Results   Component Value Date    WBC 7.6 02/18/2019    WBC 4.3 02/04/2019    WBC 4.4 02/02/2019    RBC 4.46 02/18/2019    RBC 3.88 02/04/2019    RBC 3.77 02/02/2019    HGB 13.0 02/18/2019    HGB 11.1 02/04/2019    HGB 10.9 02/02/2019    HCT 38.5 02/18/2019    HCT 33.2 02/04/2019    HCT 32.6 02/02/2019    MCV 86.2 02/18/2019    MCV 85.6 02/04/2019    MCV 86.5 02/02/2019    RDW 14.2 02/18/2019    RDW 14.8 02/04/2019    RDW 15.0 02/02/2019     02/18/2019     02/04/2019     02/02/2019     BMP:  Lab Results   Component Value Date     02/18/2019     02/04/2019     02/02/2019    K 3.6 02/18/2019    K 3.5 02/04/2019    K 3.6 02/02/2019    K 3.5 02/01/2019     02/18/2019     02/04/2019     02/02/2019    CO2 27 02/18/2019    CO2 26 02/04/2019    CO2 24 02/02/2019    PHOS 3.1 08/15/2018    PHOS 3.2 08/14/2018    PHOS 3.8 06/04/2018    BUN 15 02/18/2019    BUN 8 02/04/2019    BUN 17 02/02/2019    CREATININE 0.9 02/18/2019    CREATININE 0.7 02/04/2019    CREATININE 0.8 02/02/2019     BNP: No results found for: PROBNP  LIPID:   Lab Results   Component Value Date    TRIG 91 09/04/2018    TRIG 88 07/12/2018    HDL 48 09/04/2018    HDL 62 07/12/2018    HDL 52 05/03/2012    LDLCALC 81 09/04/2018    LDLCALC 73 07/12/2018       Cardiac Imaging:  Echo 2/4/19:    Technically difficult examination. Normal left ventricle size and systolic function with an estimated ejection fraction of 55-60%. There is mild to moderate LVH   No regional wall motion abnormalities are seen. Left ventricular diastolic filling pressure are indeterminate. The right atrium and ventricle appear dilated. There is a small pericardial effusion         Echo 9/2/18:    Normal left ventricular systolic function with ejection fraction of 55-60%. No regional wall motion abnormalites are seen. Grade I diastolic dysfunction with normal filling pressure. Assessment:    1. PAF (paroxysmal atrial fibrillation) ([address]    2. Essential hypertension    3. Dyslipidemia    4. Cerebrovascular accident (CVA) due to bilateral embolism of cerebellar arteries ([address]    5. Stenosis of internal carotid artery with cerebral infarction, left ([address]    6. COPD, severe ([address]    7. Smoker          Plan:   1. Continue current heart medicines  2. Have blood work at your earliest convenience - does not need to be fasting  3. Follow up with me in 3 months      I appreciate the opportunity of cooperating in the care of this individual.    [place], SORAIDA - CNP, 4/8/2019, 10:50 AM       QUALITY MEASURES  1. Tobacco Cessation Counseling: Yes  2. Retake of BP if >140/90:   NA  3. Documentation to PCP/referring for new patient:  Sent to PCP at close of office visit  4. CAD patient on anti-platelet: Yes  5. CAD patient on STATIN therapy:  Yes  6.  Patient with CHF and aFib on anticoagulation:  Yes

## 2019-04-09 NOTE — TELEPHONE ENCOUNTER
----- Message from SORAIDA Gutierrez CNP sent at 4/9/2019  9:05 AM EDT -----  Magnesium level is okay, potassium on low side of normal. Blood count overall stable. No new recommendations. Follow up as planned.    Thank you, [facility]

## 2019-04-09 NOTE — TELEPHONE ENCOUNTER
Spoke with Levander Klinefelter, who is on HIPAA form. I relayed message to Levander Klinefelter, he verbalized understanding and will inform Darnell March of results.

## 2019-04-10 NOTE — CARE COORDINATION
Ambulatory Care Coordination Note  4/10/2019  CM Risk Score: 14  Rosalinda Mortality Risk Score: 71    ACC: Evans Burns RN    Summary Note: Reports doesn't weigh daily but will start daily wts and log for review. .Reinforced daily wts and to call office with > 3 lbs gain in 1-2 days or > 5 lbs in 1 week. Reviewed low fat, low sodium diets. Denies any edema and occasionally gets slight sob on exertion. .Alternate activity with rest periods with flare ups and as needed. Has [facility] coming into home. Pt doesn't even smoke 1 cigarette daily now. Plans to change insurance companies May 1 since having trouble affording Eliquis. Offered to do pt assistance program but declined. Had already checked on line and doesn't meet d/t income. Does get samples at cardiologist office. Reports saw cardiologist and no changes in medications. Did say that her potassium level was a little low and tries to keep wt up and will mail information on this. BP this am was 118/64. PLAN:  1) FU on K/^wt information mailed  2) Review wts, sob, edema  3)  Reviewed low fat, low sodium diets.    4) FU appts/labs  Congestive Heart Failure Assessment    Are you currently restricting fluids?:  No Restriction  How many restaurant meals do you eat per week?:  1-2  Do you salt your food before tasting it?:  No     No patient-reported symptoms      Symptoms:   None:  Yes      Symptom course:  stable  Weight trend:  stable  Salt intake watch compared to last visit:  stable      and   COPD Assessment    Does the patient understand envrionmental exposure?:  Yes  Is the patient able to verbalize Rescue vs. Long Acting medications?:  Yes  Does the patient have a nebulizer?:  No     No patient-reported symptoms         Symptoms:                   Ambulatory Care Coordination Assessment    Care Coordination Protocol  Program Enrollment:  Complex Care  Referral from Primary Care Provider:  No  Week 1 - Initial Assessment     Do you have all of your Lianne Kirkpatrick MD   atorvastatin (LIPITOR) 40 MG tablet Take 1 tablet by mouth daily 9/20/18   Amber Vallejo MD   b complex-C-folic acid (NEPHROCAPS) 1 MG capsule Take 1 capsule by mouth daily 9/20/18   Amber Vallejo MD   traZODone (DESYREL) 50 MG tablet TAKE ONE TABLET BY MOUTH EVERY NIGHT AS NEEDED 9/20/18   Vaishnavi Vallejo MD   albuterol sulfate HFA (VENTOLIN HFA) 108 (90 Base) MCG/ACT inhaler Inhale 2 puffs into the lungs every 6 hours as needed for Wheezing    Historical Provider, MD   citalopram (CELEXA) 20 MG tablet Take 1 tablet by mouth daily 7/12/18   Amber Vallejo MD   oxybutynin (DITROPAN) 5 MG tablet Take 1 tablet by mouth 2 times daily 7/12/18   Amber Vallejo MD   aspirin 81 MG tablet Take 81 mg by mouth daily    Historical Provider, MD   Cholecalciferol (VITAMIN D3) 2000 UNITS CAPS Take  by mouth. Historical Provider, MD   oxybutynin (DITROPAN XL) 15 MG CR tablet TAKE ONE TABLET BY MOUTH EVERY NIGHT AT BEDTIME 3/5/13 8/22/18  Vaishnavi Vallejo MD   vitamin B-12 (CYANOCOBALAMIN) 1000 MCG tablet Take 1,000 mcg by mouth daily.       Historical Provider, MD       Future Appointments   Date Time Provider Agueda Kiser   7/16/2019  9:30 AM Nas Larson MD [facility] AND [facility]

## 2019-04-10 NOTE — TELEPHONE ENCOUNTER
I agree with your plan. (FYI It would be nice to include the acutal smartphrase in your current smartphrase since I don't use it enough to remember what the smartphrase it is to use, thanks).

## 2019-04-24 NOTE — CARE COORDINATION
Ambulatory Care Coordination Note  4/24/2019  CM Risk Score: 14  Rosalinda Mortality Risk Score: 71    ACC: Skye Burns RN    Summary Note: Jose Raul this am in bathroom, no injuries but lt rib area is sore. Taking tylenol which helps. Reinforced to splint with pillow with cough,sneeze or BM. Offered appt but declined and will call if becomes bothersome. Continues with daily wts and is staying about the same. .Denies any sob or edema at this time. No changes in medications and taking them ok. Did receive the list of foods higher in potassium and no questions. Did change insurance to [facility] [employer] which will start 5/1/19 and will inform Dr Oumar Chanel office when they change over. PLAN:  1) Review wt,sob,edema  2) Any recent falls  3) FU left side  Congestive Heart Failure Assessment    Are you currently restricting fluids?:  No Restriction  How many restaurant meals do you eat per week?:  1-2  Do you salt your food before tasting it?:  No     No patient-reported symptoms      Symptoms:   None:  Yes      Symptom course:  stable  Weight trend:  stable  Salt intake watch compared to last visit:  stable      and   COPD Assessment    Does the patient understand envrionmental exposure?:  Yes  Is the patient able to verbalize Rescue vs. Long Acting medications?:  Yes  Does the patient have a nebulizer?:  No     No patient-reported symptoms         Symptoms:                   Care Coordination Interventions    Program Enrollment:  Complex Care  Referral from Primary Care Provider:  No  Suggested Interventions and Community Resources  Fall Risk Prevention:  Completed  Disease Association:  Completed  Hospice:  Completed (Comment: [facility] nurse comes every 2 weeks)  Zone Management Tools:  Completed         Goals Addressed                 This Visit's Progress     Self Monitoring   Improving     Daily Weights - I will notify my provider of any increase in weight by 3 or more pounds in 2 days OR 5 or more pounds in a week.     Patient Future Appointments   Date Time Provider Agueda Kiser   7/16/2019  9:30  Matias Patel MD [facility] AND [facility]

## 2019-05-02 NOTE — TELEPHONE ENCOUNTER
Pt's POA - Evin Alston called to check the status of pt's paperwork - from [employer] - verification of chronic conditions. Colby Mcardle said that ins co said they have never received .   Please call Colby Mcardle w/status

## 2019-05-08 NOTE — CARE COORDINATION
Ambulatory Care Coordination Note  5/8/2019  CM Risk Score: 14  Rosalinda Mortality Risk Score: 71    ACC: Manjit Burns RN    Summary Note: Denies any sob or edema. .Reinforced daily wts and to call office with > 3 lbs gain in 1-2 days or > 5 lbs in 1 week. Reports lost a few pounds. Changed insurance to [facility] [employer] and will bring card in next visit. No changes in medications and taking them ok. Reports rib area is better and denies any falls. PLAN:  1) Review wts, sob, edema  2) FU appts/labs  Congestive Heart Failure Assessment    Are you currently restricting fluids?:  No Restriction  How many restaurant meals do you eat per week?:  1-2  Do you salt your food before tasting it?:  No     No patient-reported symptoms      Symptoms:   None:  Yes          and   COPD Assessment    Does the patient understand envrionmental exposure?:  Yes  Is the patient able to verbalize Rescue vs. Long Acting medications?:  Yes  Does the patient have a nebulizer?:  No     No patient-reported symptoms         Symptoms:                   Care Coordination Interventions    Program Enrollment:  Complex Care  Referral from Primary Care Provider:  No  Suggested Interventions and Community Resources  Fall Risk Prevention:  Completed  Disease Association:  Completed  Hospice:  Completed (Comment: [facility] nurse comes every 2 weeks)  Zone Management Tools:  Completed         Goals Addressed                 This Visit's Progress     Self Monitoring   Improving     Daily Weights - I will notify my provider of any increase in weight by 3 or more pounds in 2 days OR 5 or more pounds in a week. Patient Reported Weight No flowsheet data found. Barriers: lack of education  Plan for overcoming my barriers: will work with [facility]  Confidence: 8/10  Anticipated Goal Completion Date: 7/10/19              Prior to Admission medications    Medication Sig Start Date End Date Taking?  Authorizing Provider   Polyethylene Glycol 3350 (MIRALAX PO) Take by mouth    Historical Provider, MD   metoprolol succinate (TOPROL XL) 25 MG extended release tablet Take 1 tablet by mouth daily 3/28/19   Harold Epley, APRN - CNP   magnesium oxide (MAGNESIUM-OXIDE) 400 (241.3 Mg) MG TABS tablet Take 1 tablet by mouth daily 2/28/19   Harold Epley, APRN - CNP   apixaban (ELIQUIS) 5 MG TABS tablet Take 1 tablet by mouth 2 times daily 2/4/19   Marivel Garcia MD   pantoprazole (PROTONIX) 40 MG tablet Take 1 tablet by mouth every morning (before breakfast) 2/5/19   Jono Garcia MD   atorvastatin (LIPITOR) 40 MG tablet Take 1 tablet by mouth daily 9/20/18   Mayur Vallejo MD   b complex-C-folic acid (NEPHROCAPS) 1 MG capsule Take 1 capsule by mouth daily 9/20/18   Mayur Vallejo MD   traZODone (DESYREL) 50 MG tablet TAKE ONE TABLET BY MOUTH EVERY NIGHT AS NEEDED 9/20/18   Mayur Vallejo MD   albuterol sulfate HFA (VENTOLIN HFA) 108 (90 Base) MCG/ACT inhaler Inhale 2 puffs into the lungs every 6 hours as needed for Wheezing    Historical Provider, MD   citalopram (CELEXA) 20 MG tablet Take 1 tablet by mouth daily 7/12/18   Mayur Vallejo MD   oxybutynin (DITROPAN) 5 MG tablet Take 1 tablet by mouth 2 times daily 7/12/18   Mayur Vallejo MD   aspirin 81 MG tablet Take 81 mg by mouth daily    Historical Provider, MD   Cholecalciferol (VITAMIN D3) 2000 UNITS CAPS Take  by mouth. Historical Provider, MD   oxybutynin (DITROPAN XL) 15 MG CR tablet TAKE ONE TABLET BY MOUTH EVERY NIGHT AT BEDTIME 3/5/13 8/22/18  Vaishnavi Vallejo MD   vitamin B-12 (CYANOCOBALAMIN) 1000 MCG tablet Take 1,000 mcg by mouth daily.       Historical Provider, MD       Future Appointments   Date Time Provider Agueda Kiser   7/16/2019  9:30 AM Elizabeth Hernandez MD [facility] AND [facility]

## 2019-05-13 NOTE — TELEPHONE ENCOUNTER
Transfer of ALL prescriptions requested to [facility] mail service.      apixaban (ELIQUIS) 5 MG TABS tablet   LAST REFILL 02/04/19  AMOUNT 60     1 REFILLS  LAST VISIT 02/20/19  NEXT VISIT 07/16/19    traZODone (DESYREL) 50 MG tablet  LAST REFILL 09/20/18  AMOUNT 90     3 REFILLS    citalopram (CELEXA) 20 MG tablet   LAST REFILL 07/12/18  AMOUNT 90     3 REFILLS    metoprolol succinate (TOPROL XL) 25 MG extended release tablet   LAST REFILL 03/28/19  AMOUNT 90     1 REFILLS    atorvastatin (LIPITOR) 40 MG tablet  LAST REFILL 09/20/18  AMOUNT 90     3 REFILLS      oxybutynin (DITROPAN) 5 MG tablet   LAST REFILL 07/12/18  AMOUNT 180     3 REFILLS    pantoprazole (PROTONIX) 40 MG tablet  LAST REFILL 02/04/19  AMOUNT 30     3 REFILLS

## 2019-05-21 NOTE — PROGRESS NOTES
fracture    Alcohol withdrawal syndrome with complication (HCC)    Centrilobular emphysema (HCC)    Encephalopathy, metabolic    Moderate malnutrition (HCC)    Malignant neoplasm of bronchus of left upper lobe (HCC)    S/P thoracotomy    Cerebrovascular accident (CVA) due to bilateral embolism of cerebellar arteries ([address]    Acute right-sided weakness    Essential hypertension    Stenosis of internal carotid artery with cerebral infarction, left (HCC)    Controlled substance agreement broken    Acute encephalopathy    Hypomagnesemia    CHF (congestive heart failure) ([address]    New onset seizure ([address]    HTN (hypertension), benign    PAF (paroxysmal atrial fibrillation) (HCC)    Dyslipidemia    Acute bronchitis    Smoker    Confusion    Acute CVA (cerebrovascular accident) ([address]    E. coli UTI    Vascular dementia without behavioral disturbance       Allergies   Allergen Reactions    Codeine Itching    Morphine Itching and Other (See Comments)     On file at [facility] Medications Marked as Taking for the 5/21/19 encounter (Office Visit) with Fanny Nissen Ngu, MD   Medication Sig Dispense Refill    apixaban (ELIQUIS) 5 MG TABS tablet Take 1 tablet by mouth 2 times daily 60 tablet 2    citalopram (CELEXA) 20 MG tablet Take 1 tablet by mouth daily 90 tablet 3    oxybutynin (DITROPAN) 5 MG tablet Take 1 tablet by mouth 2 times daily 180 tablet 3    pantoprazole (PROTONIX) 40 MG tablet Take 1 tablet by mouth every morning (before breakfast) 90 tablet 3    Polyethylene Glycol 3350 (MIRALAX PO) Take by mouth      metoprolol succinate (TOPROL XL) 25 MG extended release tablet Take 1 tablet by mouth daily 90 tablet 1    magnesium oxide (MAGNESIUM-OXIDE) 400 (241.3 Mg) MG TABS tablet Take 1 tablet by mouth daily 90 tablet 1    atorvastatin (LIPITOR) 40 MG tablet Take 1 tablet by mouth daily 90 tablet 3    b complex-C-folic acid (NEPHROCAPS) 1 MG capsule Take 1 capsule by mouth daily 90 capsule 3    traZODone (DESYREL) 50 MG tablet TAKE ONE TABLET BY MOUTH EVERY NIGHT AS NEEDED 90 tablet 3    albuterol sulfate HFA (VENTOLIN HFA) 108 (90 Base) MCG/ACT inhaler Inhale 2 puffs into the lungs every 6 hours as needed for Wheezing      aspirin 81 MG tablet Take 81 mg by mouth daily      Cholecalciferol (VITAMIN D3) 2000 UNITS CAPS Take  by mouth.  vitamin B-12 (CYANOCOBALAMIN) 1000 MCG tablet Take 1,000 mcg by mouth daily. Review of Systems: 14 systems were negative except of what was stated on HPI    Nursing note and vitals reviewed. Vitals:    05/21/19 1136   BP: 102/60   Pulse: 110   SpO2: 90%   Weight: 121 lb 12.8 oz (55.2 kg)   Height: 5' 6\" (1.676 m)     Wt Readings from Last 3 Encounters:   05/21/19 121 lb 12.8 oz (55.2 kg)   04/08/19 126 lb (57.2 kg)   03/04/19 125 lb (56.7 kg)     BP Readings from Last 3 Encounters:   05/21/19 102/60   04/08/19 80/60   03/04/19 117/60     Body mass index is 19.66 kg/m². Constitutional: Patient appears well-developed and well-nourished. No distress. Head: Normocephalic and atraumatic. Neck: Normal range of motion. Neck supple. No thyroidmegaly. Cardiovascular: Normal rate, regular rhythm, normal heart sounds and intact distal pulses. Pulmonary/Chest: Effort normal and breath sounds normal. No stridor. No respiratory distress. No wheezes and no rales. Abdominal: Soft. Bowel sounds are normal. No distension and no mass. No tenderness. No rebound and no guarding. Musculoskeletal: No edema and no tenderness. Skin: No rash or erythema. Psychiatric: Normal mood and affect. Behavior is normal.     Assessment/Plan:  Maykel Gleason was seen today for altered mental status. Diagnoses and all orders for this visit:    Confusion  -     POCT Urinalysis no Micro; Future    Weakness  -     POCT Urinalysis no Micro; Future    Pt unable to give a urine sample today so a cup was given to bring back to day if able to give a sample.       Return if symptoms worsen or fail to improve.

## 2019-05-22 NOTE — RESULT ENCOUNTER NOTE
Inform patient Your urine is not bad, it just has a little bacteria and blood so I will send it away for culture to see if there's an infection or just skin contamination

## 2019-06-05 NOTE — PROGRESS NOTES
oxybutynin (DITROPAN) 5 MG tablet, Take 1 tablet by mouth 2 times daily, Disp: 180 tablet, Rfl: 3    Polyethylene Glycol 3350 (MIRALAX PO), Take by mouth, Disp: , Rfl:     metoprolol succinate (TOPROL XL) 25 MG extended release tablet, Take 1 tablet by mouth daily, Disp: 90 tablet, Rfl: 1    magnesium oxide (MAGNESIUM-OXIDE) 400 (241.3 Mg) MG TABS tablet, Take 1 tablet by mouth daily, Disp: 90 tablet, Rfl: 1    atorvastatin (LIPITOR) 40 MG tablet, Take 1 tablet by mouth daily, Disp: 90 tablet, Rfl: 3    b complex-C-folic acid (NEPHROCAPS) 1 MG capsule, Take 1 capsule by mouth daily, Disp: 90 capsule, Rfl: 3    traZODone (DESYREL) 50 MG tablet, TAKE ONE TABLET BY MOUTH EVERY NIGHT AS NEEDED, Disp: 90 tablet, Rfl: 3    albuterol sulfate HFA (VENTOLIN HFA) 108 (90 Base) MCG/ACT inhaler, Inhale 2 puffs into the lungs every 6 hours as needed for Wheezing, Disp: , Rfl:     aspirin 81 MG tablet, Take 81 mg by mouth daily, Disp: , Rfl:     Cholecalciferol (VITAMIN D3) 2000 UNITS CAPS, Take  by mouth., Disp: , Rfl:     vitamin B-12 (CYANOCOBALAMIN) 1000 MCG tablet, Take 1,000 mcg by mouth daily. , Disp: , Rfl:     gabapentin (NEURONTIN) 300 MG capsule, Take 1 capsule by mouth as needed. , Disp: , Rfl:     pantoprazole (PROTONIX) 40 MG tablet, Take 1 tablet by mouth every morning (before breakfast), Disp: 90 tablet, Rfl: 3    Codeine and Morphine    Vitals:    06/05/19 1425   BP: (!) 94/54   Site: Left Upper Arm   Position: Sitting   Cuff Size: Medium Adult   Pulse: 60   SpO2: (!) 87%   Weight: 124 lb (56.2 kg)   Height: 5' 6\" (1.676 m)       Review of Systems    Physical Exam   Constitutional: She appears well-developed and well-nourished. No distress. HENT:   Head: Normocephalic and atraumatic. Mouth/Throat: Oropharynx is clear and moist. No oropharyngeal exudate. Eyes: Pupils are equal, round, and reactive to light. EOM are normal.   Neck: Neck supple. No JVD present.    Cardiovascular: Normal heart sounds. Exam reveals no gallop and no friction rub. No murmur heard. Pulmonary/Chest: Effort normal. She has no wheezes. She has no rales. Equal chest rise and expansion bilaterally   Abdominal: Soft. Bowel sounds are normal. She exhibits no distension. There is no tenderness. Musculoskeletal: Normal range of motion. She exhibits no edema. Lymphadenopathy:     She has no cervical adenopathy. Neurological: She is alert. No cranial nerve deficit. CN 2-12 grossly intact   Skin: Skin is warm and dry. No rash noted. She is not diaphoretic. ASSESSMENT:    1. Chronic respiratory failure with hypoxia (HCC)    2. Centrilobular emphysema ([address]    3.  Tobacco dependence      PLAN:    -Escalate regimen with ICS/LABA, provided her with samples of Symbicort in the office today and discussed proper use  -Start on supplemental oxygen due to persistent hypoxia, I reviewed the potential dangers including fire risk with active smoking in detail.   -Can consider sleep apnea eval if symptoms not improved with above  -Counseled on smoking cessation  -Return to clinic in 2-3 weeks to reassess     Orders Placed This Encounter   Procedures    Nasal Cannula Eddie Zhang MD

## 2019-06-05 NOTE — PATIENT INSTRUCTIONS
Remember to bring all pulmonary medications to your next appointment with the office. Please keep all of your future appointments scheduled by [facility] Pulmonary office. Out of respect for other patients and providers, you may be asked to reschedule your appointment if you arrive later than your scheduled appointment time. Appointments cancelled less than 24hrs in advance will be considered a no show. Patients with three missed appointments within 1 year or four missed appointments within 2 years can be dismissed from the practice.        [facility] for O2:    [facility]  ([address]) [phone] or  [address] [facility] [place], [address]

## 2019-06-05 NOTE — PROGRESS NOTES
MA Communication: The following orders are received by verbal communication from Dr. Darwin Cope.     Orders include:  Order for O2 faxed to [facility]       Samples Symbicort given to pt       2 wk fu scheduled 6/19/19

## 2019-06-10 NOTE — CARE COORDINATION
Ambulatory Care Coordination Note  6/10/2019  CM Risk Score: 6  Rosalinda Mortality Risk Score: 71    ACC: Regino Cabot L. Hausermann, RN    Summary Note:  .Reinforced daily wts and to call office with > 3 lbs gain in 1-2 days or > 5 lbs in 1 week. Reports wt is staying about the same 124# . Denies any sob or edema at this time. Reports not using O2 and sats are staying in mid 90's now. No changes in medications and reports is breathing better with the symbicort. PLAN:  1) Review wts, sob, edema  2) FU appts/labs  Congestive Heart Failure Assessment    Are you currently restricting fluids?:  No Restriction  Do you understand a low sodium diet?:  Yes  How many restaurant meals do you eat per week?:  1-2  Do you salt your food before tasting it?:  No     No patient-reported symptoms      Symptoms:   None:  Yes      Symptom course:  stable  Weight trend:  stable      and   COPD Assessment    Does the patient understand envrionmental exposure?:  Yes  Is the patient able to verbalize Rescue vs. Long Acting medications?:  Yes  Does the patient have a nebulizer?:  No     No patient-reported symptoms         Symptoms:                   Care Coordination Interventions    Program Enrollment:  Complex Care  Referral from Primary Care Provider:  No  Suggested Interventions and Community Resources  Fall Risk Prevention:  Completed  Disease Association:  Completed  Hospice:  Completed (Comment: [facility] nurse comes every 2 weeks)  Zone Management Tools:  Completed         Goals Addressed                 This Visit's Progress     Self Monitoring   Improving     Daily Weights - I will notify my provider of any increase in weight by 3 or more pounds in 2 days OR 5 or more pounds in a week. Patient Reported Weight No flowsheet data found.     Barriers: lack of education  Plan for overcoming my barriers: will work with [facility]  Confidence: 8/10  Anticipated Goal Completion Date: 7/10/19              Prior to Admission medications    Medication Sig Start Date End Date Taking? Authorizing Provider   gabapentin (NEURONTIN) 300 MG capsule Take 1 capsule by mouth as needed. Historical Provider, MD   budesonide-formoterol [facility]) 160-4.5 MCG/ACT AERO Inhale 2 puffs into the lungs 2 times daily [id number]  PCN:CN  NNJ:[id number]  ZM:[id number] ($0 Guarantee (up to $100)) 6/5/19   Romayne Orleans, MD   apixaban (ELIQUIS) 5 MG TABS tablet Take 1 tablet by mouth 2 times daily 5/13/19   Los Vallejo MD   citalopram (CELEXA) 20 MG tablet Take 1 tablet by mouth daily 5/13/19   Los Vallejo MD   oxybutynin (DITROPAN) 5 MG tablet Take 1 tablet by mouth 2 times daily 5/13/19   Los Vallejo MD   pantoprazole (PROTONIX) 40 MG tablet Take 1 tablet by mouth every morning (before breakfast) 5/13/19   Los Vallejo MD   Polyethylene Glycol 3350 (MIRALAX PO) Take by mouth    Historical Provider, MD   metoprolol succinate (TOPROL XL) 25 MG extended release tablet Take 1 tablet by mouth daily 3/28/19   SORAIDA Espinosa CNP   magnesium oxide (MAGNESIUM-OXIDE) 400 (241.3 Mg) MG TABS tablet Take 1 tablet by mouth daily 2/28/19   SORAIDA Espinosa CNP   atorvastatin (LIPITOR) 40 MG tablet Take 1 tablet by mouth daily 9/20/18   Los Vallejo MD   b complex-C-folic acid (NEPHROCAPS) 1 MG capsule Take 1 capsule by mouth daily 9/20/18   Los Vallejo MD   traZODone (DESYREL) 50 MG tablet TAKE ONE TABLET BY MOUTH EVERY NIGHT AS NEEDED 9/20/18   Vaishnavi Vallejo MD   albuterol sulfate HFA (VENTOLIN HFA) 108 (90 Base) MCG/ACT inhaler Inhale 2 puffs into the lungs every 6 hours as needed for Wheezing    Historical Provider, MD   aspirin 81 MG tablet Take 81 mg by mouth daily    Historical Provider, MD   Cholecalciferol (VITAMIN D3) 2000 UNITS CAPS Take  by mouth.     Historical Provider, MD   oxybutynin (DITROPAN XL) 15 MG CR tablet TAKE ONE TABLET BY MOUTH EVERY NIGHT AT BEDTIME 3/5/13 8/22/18  Vaishnavi Vallejo MD   vitamin B-12 (CYANOCOBALAMIN) 1000 MCG tablet Take 1,000

## 2019-06-19 NOTE — PROGRESS NOTES
Pulmonary Outpatient Note   Nelson Conway MD       6/19/2019    Chief Complaint:  2 Week Follow-Up (not using o2 at home,  says Tasha Hand is stable without it )     HPI:   75y.o. year old female here for further evaluation of COPD. Since last visit she was started on Symbicort which has helped her breathing. Stopped using the oxygen, her saturations today were 94% on room air. Denies any respiratory complaints.    Still smoking     Past Medical History:   Diagnosis Date    Acute cerebrovascular accident (CVA) ([address] 9/3/2018    Alcohol abuse 5/22/2018    Asthma     CHF (congestive heart failure) ([address] 2/1/2019    COPD, severe ([address] 5/26/2015    Coronary artery disease involving native coronary artery of native heart without angina pectoris 8/4/2017    Essential hypertension     GERD (gastroesophageal reflux disease)     Hyperlipidemia     Hypertension     Injury of esophagus     inflamed    Insomnia     Kidney stone 5624-3991    Left hip pain 2/3/2015    Malignant neoplasm of bronchus of left upper lobe ([address] 8/13/2018    Mass of upper lobe of left lung 08/2017    Neuropathy 11/13/2014    New onset seizure ([address]     Osteoarthritis     Prolonged emergence from general anesthesia     Stenosis of internal carotid artery with cerebral infarction, left ([address]     Urinary incontinence        Past Surgical History:   Procedure Laterality Date    BRONCHOSCOPY  08/16/2017    Dr. Regino Durbin - w/BAL of LATANYA, LLL; EBUS guided lymph node biopsies, TBNA of LATANYA lung mass    COLONOSCOPY  02/05/2013    Dr. Lucita Rivero - 4 diminutive sessile rectal polyps    COLONOSCOPY  02/02/2010    Dr. Lucita Rivero - adenomatous colon polyps    HYSTERECTOMY, TOTAL ABDOMINAL      LITHOTRIPSY      CT OFFICE/OUTPT VISIT,PROCEDURE ONLY Left 8/13/2018    LEFT VIDEO ASSISTED THORACOSCOPIC SURGERY WITH BIOPSY OF AP WINDOW LYMPH NODE, POSSIBLE LEFT UPPER LOBE WEDGE RESECTION          CPT CODES - 43666, 17920 performed by Francois Kirby 100 Tony Morales MD at [facility] Left 9/6/2018    LEFT CAROTID ENDARTERECTOMY performed by Breanna Gasca MD at [facility] Left 08/13/2018    Dr. Tony Gage - video assisted w/wedge resection of LATANYA, MSLND       Social History     Tobacco Use    Smoking status: Current Every Day Smoker     Packs/day: 0.50     Years: 50.00     Pack years: 25.00     Types: Cigarettes    Smokeless tobacco: Never Used    Tobacco comment: maybe 1 cig a week   Substance Use Topics    Alcohol use: No     Alcohol/week: 4.8 oz     Types: 8 Standard drinks or equivalent per week     Comment: 5/18- No alcohol          Family History   Problem Relation Age of Onset    High Blood Pressure Mother     Cancer Mother [de-identified]        Breast and Lung    Cancer Sister 52        Brain Tumor    Heart Disease Brother 39    Cancer Maternal Grandmother         Breast    Other Father         abdominal aneurysm    Glaucoma Father     Cancer Paternal Uncle         lung    Diabetes Sister     High Blood Pressure Sister     High Cholesterol Sister     Other Sister         Gallstone    Cancer Sister         Breast    Cancer Sister         brain tumor         Current Outpatient Medications:     budesonide-formoterol (SYMBICORT) 160-4.5 MCG/ACT AERO, Inhale 2 puffs into the lungs 2 times daily, Disp: 3 Inhaler, Rfl: 4    gabapentin (NEURONTIN) 300 MG capsule, Take 1 capsule by mouth as needed. , Disp: , Rfl:     budesonide-formoterol (SYMBICORT) 160-4.5 MCG/ACT AERO, Inhale 2 puffs into the lungs 2 times daily [id number]  PCN:PEGGY  LGU:[id number]  ZJ:[phone] ($0 Guarantee (up to $100)), Disp: 1 Inhaler, Rfl: 5    apixaban (ELIQUIS) 5 MG TABS tablet, Take 1 tablet by mouth 2 times daily, Disp: 60 tablet, Rfl: 2    citalopram (CELEXA) 20 MG tablet, Take 1 tablet by mouth daily, Disp: 90 tablet, Rfl: 3    oxybutynin (DITROPAN) 5 MG tablet, Take 1 tablet by mouth 2 times daily, Disp: 180 tablet, Rfl: 3  

## 2019-06-19 NOTE — PROGRESS NOTES
MA Communication: The following orders are received by verbal communication from Dr. Camille Romano.     Orders include:  Symbicort samples given to patient       5 mo fu scheduled 11/20/19

## 2019-06-19 NOTE — PATIENT INSTRUCTIONS
Remember to bring all pulmonary medications to your next appointment with the office. Please keep all of your future appointments scheduled by [address], [facility] office. Out of respect for other patients and providers, you may be asked to reschedule your appointment if you arrive later than your scheduled appointment time. Appointments cancelled less than 24hrs in advance will be considered a no show. Patients with three missed appointments within 1 year or four missed appointments within 2 years can be dismissed from the practice.

## 2019-06-24 NOTE — CARE COORDINATION
Ambulatory Care Coordination Note  6/24/2019  CM Risk Score: 6  Rosalinda Mortality Risk Score: 71    ACC: Kamlesh Burns RN    Summary Note: Not interested in smoking cessation at this time. Reports breathing is doing better on symbicort and sats are staying in the 90's. .Denies any sob or edema at this time. .Reinforced daily wts and to call office with > 3 lbs gain in 1-2 days or > 5 lbs in 1 week. Reports wt is staying within 2 lbs and weighs daily. Reports saw pulmonologist last week and is doing fine and no changes in medications. PLAN:  1) Review wts, sob, edema  2) FU appts/labs  Congestive Heart Failure Assessment    Are you currently restricting fluids?:  No Restriction  Do you understand a low sodium diet?:  Yes  How many restaurant meals do you eat per week?:  1-2  Do you salt your food before tasting it?:  No         Symptoms:   None:  Yes      Symptom course:  stable      and   COPD Assessment    Does the patient understand envrionmental exposure?:  Yes  Is the patient able to verbalize Rescue vs. Long Acting medications?:  Yes  Does the patient have a nebulizer?:  No     No patient-reported symptoms         Symptoms:                   Care Coordination Interventions    Program Enrollment:  Complex Care  Referral from Primary Care Provider:  No  Suggested Interventions and Community Resources  Fall Risk Prevention:  Completed  Disease Association:  Completed  Hospice:  Completed (Comment: [facility] nurse comes every 2 weeks)  Registered Dietician:  Declined  Smoking Cessation:  Declined  Zone Management Tools:  Completed         Goals Addressed                 This Visit's Progress     Self Monitoring   Improving     Daily Weights - I will notify my provider of any increase in weight by 3 or more pounds in 2 days OR 5 or more pounds in a week. Patient Reported Weight No flowsheet data found.     Barriers: lack of education  Plan for overcoming my barriers: will work with [facility]  Confidence: 8/10  Anticipated Goal Completion Date: 7/10/19              Prior to Admission medications    Medication Sig Start Date End Date Taking? Authorizing Provider   budesonide-formoterol (SYMBICORT) 160-4.5 MCG/ACT AERO Inhale 2 puffs into the lungs 2 times daily [id number]  PCN:PEGGY  UON:[id number]  RV:[id number] ($0 Guarantee (up to $100)) 6/20/19   Kinsey Fisher MD   budesonide-formoterol [facility]) 160-4.5 MCG/ACT AERO Inhale 2 puffs into the lungs 2 times daily 6/19/19   Kinsey Fisher MD   gabapentin (NEURONTIN) 300 MG capsule Take 1 capsule by mouth as needed.     Historical Provider, MD   budesonide-formoterol (SYMBICORT) 160-4.5 MCG/ACT AERO Inhale 2 puffs into the lungs 2 times daily [id number]  PCN:PEGGY  ODX:[id number]  YZ:[id number] ($0 Guarantee (up to $100)) 6/5/19   Kinsey Fisher MD   apixaban (ELIQUIS) 5 MG TABS tablet Take 1 tablet by mouth 2 times daily 5/13/19   Idania Vallejo MD   citalopram (CELEXA) 20 MG tablet Take 1 tablet by mouth daily 5/13/19   Idania Vallejo MD   oxybutynin (DITROPAN) 5 MG tablet Take 1 tablet by mouth 2 times daily 5/13/19   Idania Vallejo MD   pantoprazole (PROTONIX) 40 MG tablet Take 1 tablet by mouth every morning (before breakfast) 5/13/19   Idania Vallejo MD   Polyethylene Glycol 3350 (MIRALAX PO) Take by mouth    Historical Provider, MD   metoprolol succinate (TOPROL XL) 25 MG extended release tablet Take 1 tablet by mouth daily 3/28/19   SORAIDA Pink CNP   magnesium oxide (MAGNESIUM-OXIDE) 400 (241.3 Mg) MG TABS tablet Take 1 tablet by mouth daily 2/28/19   SORAIDA Pink CNP   atorvastatin (LIPITOR) 40 MG tablet Take 1 tablet by mouth daily 9/20/18   Idania Vallejo MD   b complex-C-folic acid (NEPHROCAPS) 1 MG capsule Take 1 capsule by mouth daily 9/20/18   Idania Vallejo MD   traZODone (DESYREL) 50 MG tablet TAKE ONE TABLET BY MOUTH EVERY NIGHT AS NEEDED 9/20/18   Vaishnavi Vallejo MD   albuterol sulfate HFA (VENTOLIN HFA) 108 (90 Base) MCG/ACT inhaler Inhale 2 puffs into the lungs every 6 hours as needed for Wheezing    Historical Provider, MD   aspirin 81 MG tablet Take 81 mg by mouth daily    Historical Provider, MD   Cholecalciferol (VITAMIN D3) 2000 UNITS CAPS Take  by mouth. Historical Provider, MD   oxybutynin (DITROPAN XL) 15 MG CR tablet TAKE ONE TABLET BY MOUTH EVERY NIGHT AT BEDTIME 3/5/13 8/22/18  Vaishnavi Vallejo MD   vitamin B-12 (CYANOCOBALAMIN) 1000 MCG tablet Take 1,000 mcg by mouth daily.       Historical Provider, MD       Future Appointments   Date Time Provider Agueda Margi   7/22/2019 10:00 AM Yany Vallejo MD [facility]   11/20/2019  1:15 PM [facility]

## 2019-07-01 NOTE — TELEPHONE ENCOUNTER
[facility] pharmacy called and asked for refills of Trazodone and Eliquis.        Trazodone  LAST REFILL 09/20/2018  AMOUNT 90     3 REFILLS  LAST VISIT 05/21/2019  NEXT VISIT 07/22/2019    Eliquis  LAST REFILL 05/13/2019  AMOUNT 60     2 REFILLS  LAST VISIT 05/21/2019  NEXT VISIT 07/22/2019

## 2019-07-22 PROBLEM — M16.12 PRIMARY OSTEOARTHRITIS OF LEFT HIP: Status: ACTIVE | Noted: 2019-01-01

## 2019-07-22 NOTE — PROGRESS NOTES
Yasmani Reeves  YOB: 1945    Date of Service:  2019    Chief Complaint:      Chief Complaint   Patient presents with    Medicare AWV    Hyperlipidemia    COPD    Hypertension       HPI:  Yasmani Reeves is a 75 y.o. Left hip pain recently and she has not been on any NSAID since being off neurontin. She also fell and broke her left 3rd finger 2 weeks ago and not want to see orthopedic or get an xray. Treatment Adherence:   Medication compliance: compliant all of the time   Diet compliance: compliant most of the time   Weight trend: stable   Current exercise: 4-5 days/week at the stores   Barriers: impairment: physical: bilateral hip pain   Hyperlipidemia: No new myalgias or GI upset on lipitor 40 mg qd due to weight loss.    Chronic bilateral knee OA stable on Keppra 500 mg bid. Off Neurontin 300 mg 1 qhs and she has not been on any NSAID. No GI upset. Mix Urine incontinence stable on oxybutynin 5 mg bid and without much dry mouth. GERD stable on Zantac 150 mg bid.   Depression: stable on Celexa 20 mg qd but not want to increase dose at this time.  Her son is dying of AIDS and her partner being ill and financial problems with cost of medication.  No SI or HI.    Insomnia:  stable on trazadone 50 mg qhs.    Hypertension: Home blood pressure monitorin/80 off Losartan 50 mg qd.   She is adherent to a low sodium diet. Patient denies chest pain, shortness of breath, headache, lightheadedness, blurred vision, peripheral edema, palpitations, dry cough and fatigue. Antihypertensive medication side effects: no medication side effects noted. Use of agents associated with hypertension: none. Severe COPD:  Stable on albuteral prn and not on other meds due to cost.  Her pulmonlogist is Dr. Stefania VO Fib:  Stable on Eliquis 5 mg bid.   Lung CA with mets: stable on radiation for the lung cancer     Lab Results   Component Value Date    LABA1C 5.6 2018    LABA1C 6.2 2016

## 2019-07-22 NOTE — PATIENT INSTRUCTIONS
Personalized Preventive Plan for [facility] - 7/22/2019  Medicare offers a range of preventive health benefits. Some of the tests and screenings are paid in full while other may be subject to a deductible, co-insurance, and/or copay. Some of these benefits include a comprehensive review of your medical history including lifestyle, illnesses that may run in your family, and various assessments and screenings as appropriate. After reviewing your medical record and screening and assessments performed today your provider may have ordered immunizations, labs, imaging, and/or referrals for you. A list of these orders (if applicable) as well as your Preventive Care list are included within your After Visit Summary for your review. Other Preventive Recommendations:    · A preventive eye exam performed by an eye specialist is recommended every 1-2 years to screen for glaucoma; cataracts, macular degeneration, and other eye disorders. · A preventive dental visit is recommended every 6 months. · Try to get at least 150 minutes of exercise per week or 10,000 steps per day on a pedometer . · Order or download the FREE \"Exercise & Physical Activity: Your Everyday Guide\" from [employer]. Call 3-545.370.1629 or search [employer] online. · You need 8654-6458 mg of calcium and 3549-8282 IU of vitamin D per day. It is possible to meet your calcium requirement with diet alone, but a vitamin D supplement is usually necessary to meet this goal.  · When exposed to the sun, use a sunscreen that protects against both UVA and UVB radiation with an SPF of 30 or greater. Reapply every 2 to 3 hours or after sweating, drying off with a towel, or swimming. · Always wear a seat belt when traveling in a car. Always wear a helmet when riding a bicycle or motorcycle.

## 2019-07-22 NOTE — PROGRESS NOTES
Laterality Date    BRONCHOSCOPY  08/16/2017    Dr. Ciro Nageotte - w/BAL of LATANYA, LLL; EBUS guided lymph node biopsies, TBNA of LATANYA lung mass    COLONOSCOPY  02/05/2013    Dr. Lorenzo Brown - 4 diminutive sessile rectal polyps    COLONOSCOPY  02/02/2010    Dr. Lorenzo Brown - adenomatous colon polyps    HYSTERECTOMY, TOTAL ABDOMINAL      LITHOTRIPSY      SD OFFICE/OUTPT VISIT,PROCEDURE ONLY Left 8/13/2018    LEFT VIDEO ASSISTED THORACOSCOPIC SURGERY WITH BIOPSY OF AP WINDOW LYMPH NODE, POSSIBLE LEFT UPPER LOBE WEDGE RESECTION          [facility] - [address], [address] performed by Tereza Conner MD at [facility] Left 9/6/2018    LEFT CAROTID ENDARTERECTOMY performed by Mayur Mcintosh MD at [facility] Left 08/13/2018    Dr. Javon Rosas - video assisted w/wedge resection of LATANYA, MSLND     Family History   Problem Relation Age of Onset    High Blood Pressure Mother     Cancer Mother [de-identified]        Breast and Lung    Cancer Sister 52        Brain Tumor    Heart Disease Brother 39    Cancer Maternal Grandmother         Breast    Other Father         abdominal aneurysm    Glaucoma Father     Cancer Paternal Uncle         lung    Diabetes Sister     High Blood Pressure Sister     High Cholesterol Sister     Other Sister         Gallstone    Cancer Sister         Breast    Cancer Sister         brain tumor       CareTeam (Including outside providers/suppliers regularly involved in providing care):   Patient Care Team:  Jacquelyn Vallejo MD as PCP - [facility]  Maria R Avery MD as PCP - [facility] [place] [facility]  Hayde Hawkins MD as Consulting Physician (Gastroenterology)  Danii Meeks as Consulting Physician (Ophthalmology)  [facility].  ALESHA Burns as Care Coordinator    Wt Readings from Last 3 Encounters:   07/22/19 121 lb (54.9 kg)   06/19/19 126 lb (57.2 kg)   06/05/19 124 lb (56.2 kg)     Vitals:    07/22/19 0951   BP: 112/60   Pulse: 59   SpO2: 94%   Weight: 121 lb (54.9 Influenza, High Dose (Fluzone 65 yrs and older) 10/25/2011, 11/01/2017, 09/20/2018    Pneumococcal Conjugate 13-valent ([id number]) 05/15/2014    Pneumococcal Polysaccharide ([id number]) 11/23/2009, 09/29/2016        Health Maintenance   Topic Date Due    DTaP/Tdap/Td vaccine (1 - Tdap) 09/17/1963    Shingles Vaccine (1 of 2) 09/17/1994    Annual Wellness Visit (AWV)  07/12/2019    Flu vaccine (1) 09/01/2019    Breast cancer screen  10/31/2019    Potassium monitoring  04/08/2020    Creatinine monitoring  04/08/2020    Colon cancer screen colonoscopy  02/01/2023    Lipid screen  09/04/2023    Pneumococcal 65+ years Vaccine  Completed    DEXA (modify frequency per FRAX score)  Addressed     Recommendations for Preventive Services Due: see orders and patient instructions/AVS.  .   Recommended screening schedule for the next 5-10 years is provided to the patient in written form: see Patient Instructions/AVS.

## 2019-07-22 NOTE — TELEPHONE ENCOUNTER
Yogi Bishop, pt's caretaker,presented himself in the office today stating the pt will need Eliquis and any other cardiac medications sent to [facility] mail order pharmacy for 90 day supply.

## 2019-07-29 NOTE — CARE COORDINATION
Ambulatory Care Coordination Note  7/29/2019  CM Risk Score: 6  Charlson 10 Year Mortality Risk Score: 100%     ACC: Sherie Burns RN    Summary Note: Spoke with pt. Not interested in smoking cessation but has cut way back she reports. .Denies any sob or edema at this time. .Reinforced daily wts and to call office with > 3 lbs gain in 1-2 days or > 5 lbs in 1 week. Reports wt is about the same. Reports no concerns or no other needs. Agreeable to dc from [facility]. Encouraged to call if needs anything in the future or with any questions. Congestive Heart Failure Assessment    Are you currently restricting fluids?:  No Restriction  Do you understand a low sodium diet?:  Yes  How many restaurant meals do you eat per week?:  1-2  Do you salt your food before tasting it?:  No     No patient-reported symptoms      Symptoms:   None:  Yes          and   COPD Assessment    Does the patient understand envrionmental exposure?:  Yes  Is the patient able to verbalize Rescue vs. Long Acting medications?:  Yes  Does the patient have a nebulizer?:  No     No patient-reported symptoms         Symptoms:                   Care Coordination Interventions    Program Enrollment:  Complex Care  Referral from Primary Care Provider:  No  Suggested Interventions and Community Resources  Fall Risk Prevention:  Completed  Disease Association:  Completed  Hospice:  Completed (Comment: [facility] nurse comes every 2 weeks)  Registered Dietician:  Declined  Smoking Cessation:  Declined  Zone Management Tools:  Completed         Goals Addressed                 This Visit's Progress     COMPLETED: Self Monitoring        Daily Weights - I will notify my provider of any increase in weight by 3 or more pounds in 2 days OR 5 or more pounds in a week. Patient Reported Weight No flowsheet data found.     Barriers: lack of education  Plan for overcoming my barriers: will work with [facility]  Confidence: 8/10  Anticipated Goal Completion Date: 7/10/19 Prior to Admission medications    Medication Sig Start Date End Date Taking? Authorizing Provider   apixaban (ELIQUIS) 5 MG TABS tablet Take 1 tablet by mouth 2 times daily 7/22/19   SORAIDA Orellana CNP   atorvastatin (LIPITOR) 40 MG tablet Take 1 tablet by mouth daily 7/22/19   SORAIDA Orellana CNP   magnesium oxide (MAGNESIUM-OXIDE) 400 (241.3 Mg) MG TABS tablet Take 1 tablet by mouth daily 7/22/19   SORAIDA Orellana CNP   metoprolol succinate (TOPROL XL) 25 MG extended release tablet Take 1 tablet by mouth daily 7/22/19   SORAIDA Orellana CNP   traZODone (DESYREL) 50 MG tablet TAKE ONE TABLET BY MOUTH EVERY NIGHT AS NEEDED 7/2/19   Katiana Vallejo MD   budesonide-formoterol (SYMBICORT) 160-4.5 MCG/ACT AERO Inhale 2 puffs into the lungs 2 times daily [id number]  PCN:[id number]  HLH:[id number]  NN:[phone] ($0 Guarantee (up to $100)) 6/20/19   Mya Stephens MD   budesonide-formoterol [facility]) 160-4.5 MCG/ACT AERO Inhale 2 puffs into the lungs 2 times daily 6/19/19   Mya Stephens MD   citalopram (CELEXA) 20 MG tablet Take 1 tablet by mouth daily 5/13/19   Katiana Vallejo MD   oxybutynin (DITROPAN) 5 MG tablet Take 1 tablet by mouth 2 times daily 5/13/19   Katiana Vallejo MD   pantoprazole (PROTONIX) 40 MG tablet Take 1 tablet by mouth every morning (before breakfast) 5/13/19   Katiana Vallejo MD   Polyethylene Glycol 3350 (MIRALAX PO) Take by mouth    Historical Provider, MD spence complex-C-folic acid (NEPHROCAPS) 1 MG capsule Take 1 capsule by mouth daily 9/20/18   Katiana Vallejo MD   albuterol sulfate HFA (VENTOLIN HFA) 108 (90 Base) MCG/ACT inhaler Inhale 2 puffs into the lungs every 6 hours as needed for Wheezing    Historical Provider, MD   aspirin 81 MG tablet Take 81 mg by mouth daily    Historical Provider, MD   Cholecalciferol (VITAMIN D3) 2000 UNITS CAPS Take  by mouth.     Historical Provider, MD   oxybutynin (DITROPAN XL) 15 MG CR tablet TAKE ONE TABLET BY MOUTH EVERY NIGHT

## 2020-01-01 ENCOUNTER — TELEPHONE (OUTPATIENT)
Dept: INTERNAL MEDICINE CLINIC | Age: 75
End: 2020-01-01

## 2020-02-21 NOTE — PLAN OF CARE
Normal exam.    BP controlled based on home readings.  Lipids treated.  Nighttime GERD, managed.     Return in 6 months.    Problem: Musculor/Skeletal Functional Status  Intervention: OT Evaluation/treatment  To return pt to baseline function with ADLs and functional t/fs.

## 2022-06-13 NOTE — PROGRESS NOTES
DATE:  6/13/2022     TIME OF RECEIPT FROM LAB: 1145    LAB TEST: hgb    LAB VALUE: 7.8    RESULTS GIVEN WITH READ-BACK TO:Alicia Valentin    TIME LAB VALUE REPORTED TO PROVIDER:[id number]  Lab phone # [phone]   Tatianna Darling  YOB: 1945    Date of Service:  3/22/2018    Chief Complaint:      Chief Complaint   Patient presents with    Hypertension     F/U after starting on losartan       HPI:  Tatianna Darling is a 67 y.o. Depression: some improvement but still a little depress on Celexa 20 mg qd but not want to increase dose at this time. Her son is dying of AIDS and her partner being ill and financial problems with cost of medication. No SI or HI.       Treatment Adherence:   Medication compliance: compliant all of the time   Diet compliance: compliant most of the time   Weight trend: stable   Current exercise: 4-5 days/week at the stores   Barriers: impairment: physical: bilateral hip pain   Hypertension: Home blood pressure monitoring: Yes but it's been high with wrist cuff on Losartan 50 mg qd. She is adherent to a low sodium diet. Patient denies chest pain, shortness of breath, headache, lightheadedness, blurred vision, peripheral edema, palpitations, dry cough and fatigue. Antihypertensive medication side effects: no medication side effects noted. Use of agents associated with hypertension: none. Hyperlipidemia: No new myalgias or GI upset with decrease to lipitor 20 mg qd due to weight loss. Chronic bilateral knee OA stable on Neurontin 300 mg 2 qhs. She decline any NSAID. No GI upset. Sleeping well on trazadone 50 mg qhs.    Severe COPD: improved on Dulera 200-5 2 puffs bid and able to walk further without shortness of breath. She's able to walk to her garden without huffing/puffing since started on the [facility]. She's still smoking but thinking of quitting. Mix Urine incontinence stable on oxybutynin 5 mg bid and without much dry mouth. GERD been off prilosec 40 qd with weight loss and cost and uses Tums prn 4-5 times at night a week.     Lab Results   Component Value Date    LABA1C 6.2 05/31/2016     Lab Results   Component Value Date     08/01/2017    K 4.5 08/01/2017    CL tenderness. Skin: No rash or erythema. Psychiatric: Normal mood and affect. Behavior is normal.     Assessment/Plan:  Janneth Sepulveda was seen today for hypertension. Diagnoses and all orders for this visit:    Adjustment reaction with anxiety and depression  -     citalopram (CELEXA) 20 MG tablet; Take 1 tablet by mouth daily    Essential hypertension  -     losartan (COZAAR) 50 MG tablet; Take 1 tablet by mouth daily    Gastroesophageal reflux disease without esophagitis  -     ranitidine (ZANTAC) 150 MG tablet;  Take 1 tablet by mouth 2 times daily    Hyperlipidemia, familial, high LDL    Primary insomnia    Mixed incontinence    COPD, severe ([facility] [address]        Return [facility] [employer] [facility] 5/30, arrive 8:45 AM.

## (undated) DEVICE — 3M™ TEGADERM™ TRANSPARENT FILM DRESSING FRAME STYLE, 1624W, 2-3/8 IN X 2-3/4 IN (6 CM X 7 CM), 100/CT 4CT/CASE: Brand: 3M™ TEGADERM™

## (undated) DEVICE — SURGIFOAM SPNG SZ 12-7

## (undated) DEVICE — TROCAR: Brand: KII FIOS FIRST ENTRY

## (undated) DEVICE — COVER,MAYO STAND,STERILE: Brand: MEDLINE

## (undated) DEVICE — SUTURE VCRL + SZ 3-0 L27IN ABSRB UD L26MM SH 1/2 CIR VCP416H

## (undated) DEVICE — SUTURE NONABSORBABLE MONOFILAMENT 6-0 C-1 1X30 IN PROLENE 8706H

## (undated) DEVICE — AMBU ASCOPE 3 SLIM - VIDEO BRONCHIOSCOPE SINGLE-USE, FLEXIBLE AND STERILE. INSERTION CORD DIA 3.8 MM, CHANNEL WIDTH OF 1.2 MM. BENDING ANGLE OF 130°/130° MIN. ORDERING 5 PCS. = 1 BOX.: Brand: ASCOPE™ 3 SLIM  3.8/1.2FLEXIBLE VIDEOSCOPE - SINGLE USE

## (undated) DEVICE — Device

## (undated) DEVICE — NEEDLE HYPO 22GA L1.5IN BLK POLYPR HUB S STL REG BVL STR

## (undated) DEVICE — SOLUTION IV IRRIG POUR BRL 0.9% SODIUM CHL 2F7124

## (undated) DEVICE — STERILE LATEX POWDER-FREE SURGICAL GLOVESWITH NITRILE COATING: Brand: PROTEXIS

## (undated) DEVICE — STAPLER EXT SKIN 35 WIDE S STL STPL SQUEEZE HNDL VISISTAT

## (undated) DEVICE — JP PERF DRN SIL FLT 7MM FULL: Brand: CARDINAL HEALTH

## (undated) DEVICE — RELOAD STPL 3.5MM L60MM 0DEG UNIV TISS PUR TI 6 ROW LIN

## (undated) DEVICE — GOWN SIRUS NONREIN LG W/TWL: Brand: MEDLINE INDUSTRIES, INC.

## (undated) DEVICE — SYRINGE, LUER LOCK, 60ML: Brand: MEDLINE

## (undated) DEVICE — ELECTRODE LAP L36CM PTFE WIRE J HK CLEANCOAT

## (undated) DEVICE — FOGARTY - HYDRAGRIP SURGICAL - CLAMP INSERTS: Brand: FOGARTY SOFTJAW

## (undated) DEVICE — MAGNETIC INSTRUMENT PAD 10" X 16"; MEDIUM; DISPOSABLE: Brand: CARDINAL HEALTH

## (undated) DEVICE — SUTURE PERMA-HAND SZ 3-0 L18IN NONABSORBABLE BLK RB-1 L17MM C053D

## (undated) DEVICE — GOWN SIRUS NONREIN XL W/TWL: Brand: MEDLINE INDUSTRIES, INC.

## (undated) DEVICE — ADHESIVE SKIN CLSR 0.7ML TOP DERMBND ADV

## (undated) DEVICE — DRAIN SURG SGL COLL PT TB FOR ATS BG OASIS

## (undated) DEVICE — SUTURE MCRYL + SZ 4-0 L18IN ABSRB UD L19MM PS-2 3/8 CIR MCP496G

## (undated) DEVICE — MAGNETIC DRAPE: Brand: DEVON

## (undated) DEVICE — TISSUE RETRIEVAL SYSTEM: Brand: INZII RETRIEVAL SYSTEM

## (undated) DEVICE — GLOVE SURG SZ 8 L11.77IN FNGR THK9.8MIL STRW LTX POLYMER

## (undated) DEVICE — SHUNT NEUROSURGICAL L10CM DIA3X4MM CAR STR FULL SPR REINF

## (undated) DEVICE — SYRINGE MED 50ML LUERLOCK TIP

## (undated) DEVICE — SUTURE PERMA-HAND SZ 0 L18IN NONABSORBABLE BLK L30MM FSL 678G

## (undated) DEVICE — TROCAR: Brand: KII SLEEVE

## (undated) DEVICE — STAPLER INT L16CM STD UNIV RELD DISP TRI-STAPLE ENDO GIA

## (undated) DEVICE — CATHETER URETH 18FR RED RUB INTMIT ALL PURP

## (undated) DEVICE — DISH PETRI ST LF

## (undated) DEVICE — SUTURE NONABSORBABLE MONOFILAMENT 7-0 BV-1 1X24 IN PROLENE 8702H

## (undated) DEVICE — SUTURE NONABSORBABLE MONOFILAMENT 4-0 RB-1 36 IN BLU PROLENE 8557H

## (undated) DEVICE — GAUZE,SPONGE,2"X2",8PLY,STERILE,LF,2'S: Brand: MEDLINE

## (undated) DEVICE — GAUZE,SPONGE,4"X4",16PLY,XRAY,STRL,LF: Brand: MEDLINE

## (undated) DEVICE — COVER US PRB W12XL244CM SURGICAL INTRAOPERATIVE PLAS TAPR L

## (undated) DEVICE — CATHETER THOR L21IN DIA28FR R ANG SFT RADPQ STRP SIL

## (undated) DEVICE — SUTURE VCRL + SZ 2-0 L27IN ABSRB CLR CT-1 1/2 CIR TAPERCUT VCP259H

## (undated) DEVICE — CATHETER THOR 28FR SIL 6 EYE STR HI TEAR STRENGTH

## (undated) DEVICE — ANTI-FOG SOLUTION WITH FOAM PAD: Brand: DEVON

## (undated) DEVICE — CHLORAPREP 26ML ORANGE